# Patient Record
Sex: MALE | Race: WHITE | NOT HISPANIC OR LATINO | Employment: OTHER | URBAN - METROPOLITAN AREA
[De-identification: names, ages, dates, MRNs, and addresses within clinical notes are randomized per-mention and may not be internally consistent; named-entity substitution may affect disease eponyms.]

---

## 2017-01-04 ENCOUNTER — OFFICE VISIT - HEALTHEAST (OUTPATIENT)
Dept: FAMILY MEDICINE | Facility: CLINIC | Age: 67
End: 2017-01-04

## 2017-01-04 DIAGNOSIS — E11.9 TYPE 2 DIABETES MELLITUS (H): ICD-10-CM

## 2017-01-04 DIAGNOSIS — I25.84 CORONARY ARTERY DISEASE DUE TO CALCIFIED CORONARY LESION: ICD-10-CM

## 2017-01-04 DIAGNOSIS — I25.10 CORONARY ARTERY DISEASE DUE TO CALCIFIED CORONARY LESION: ICD-10-CM

## 2017-01-04 ASSESSMENT — MIFFLIN-ST. JEOR: SCORE: 1941.94

## 2017-01-12 ENCOUNTER — OFFICE VISIT - HEALTHEAST (OUTPATIENT)
Dept: CARDIOLOGY | Facility: CLINIC | Age: 67
End: 2017-01-12

## 2017-01-12 DIAGNOSIS — E11.9 TYPE 2 DIABETES MELLITUS (H): ICD-10-CM

## 2017-01-12 DIAGNOSIS — I25.84 CORONARY ARTERY DISEASE DUE TO CALCIFIED CORONARY LESION: ICD-10-CM

## 2017-01-12 DIAGNOSIS — I10 ESSENTIAL HYPERTENSION: ICD-10-CM

## 2017-01-12 DIAGNOSIS — I25.10 CORONARY ARTERY DISEASE DUE TO CALCIFIED CORONARY LESION: ICD-10-CM

## 2017-01-12 DIAGNOSIS — E78.5 DYSLIPIDEMIA, GOAL LDL BELOW 70: ICD-10-CM

## 2017-01-12 ASSESSMENT — MIFFLIN-ST. JEOR: SCORE: 1941.94

## 2017-01-31 ENCOUNTER — OFFICE VISIT - HEALTHEAST (OUTPATIENT)
Dept: CARDIOLOGY | Facility: CLINIC | Age: 67
End: 2017-01-31

## 2017-01-31 DIAGNOSIS — I10 ESSENTIAL HYPERTENSION WITH GOAL BLOOD PRESSURE LESS THAN 130/85: ICD-10-CM

## 2017-01-31 DIAGNOSIS — I25.10 CORONARY ARTERY DISEASE DUE TO CALCIFIED CORONARY LESION: ICD-10-CM

## 2017-01-31 DIAGNOSIS — E78.5 DYSLIPIDEMIA, GOAL LDL BELOW 70: ICD-10-CM

## 2017-01-31 DIAGNOSIS — I20.0 ACCELERATING ANGINA (H): ICD-10-CM

## 2017-01-31 DIAGNOSIS — I25.84 CORONARY ARTERY DISEASE DUE TO CALCIFIED CORONARY LESION: ICD-10-CM

## 2017-01-31 ASSESSMENT — MIFFLIN-ST. JEOR: SCORE: 1937.41

## 2017-02-13 ENCOUNTER — COMMUNICATION - HEALTHEAST (OUTPATIENT)
Dept: FAMILY MEDICINE | Facility: CLINIC | Age: 67
End: 2017-02-13

## 2017-02-13 DIAGNOSIS — I25.84 CORONARY ARTERY DISEASE DUE TO CALCIFIED CORONARY LESION: ICD-10-CM

## 2017-02-13 DIAGNOSIS — I25.10 CORONARY ARTERY DISEASE DUE TO CALCIFIED CORONARY LESION: ICD-10-CM

## 2017-02-21 ENCOUNTER — COMMUNICATION - HEALTHEAST (OUTPATIENT)
Dept: FAMILY MEDICINE | Facility: CLINIC | Age: 67
End: 2017-02-21

## 2017-02-21 DIAGNOSIS — I25.10 CORONARY ARTERY DISEASE DUE TO CALCIFIED CORONARY LESION: ICD-10-CM

## 2017-02-21 DIAGNOSIS — I25.84 CORONARY ARTERY DISEASE DUE TO CALCIFIED CORONARY LESION: ICD-10-CM

## 2017-02-24 ENCOUNTER — COMMUNICATION - HEALTHEAST (OUTPATIENT)
Dept: CARDIOLOGY | Facility: CLINIC | Age: 67
End: 2017-02-24

## 2017-03-14 ENCOUNTER — OFFICE VISIT - HEALTHEAST (OUTPATIENT)
Dept: FAMILY MEDICINE | Facility: CLINIC | Age: 67
End: 2017-03-14

## 2017-03-14 DIAGNOSIS — I25.10 CORONARY ARTERY DISEASE DUE TO CALCIFIED CORONARY LESION: ICD-10-CM

## 2017-03-14 DIAGNOSIS — I25.84 CORONARY ARTERY DISEASE DUE TO CALCIFIED CORONARY LESION: ICD-10-CM

## 2017-03-14 ASSESSMENT — MIFFLIN-ST. JEOR: SCORE: 1930.6

## 2017-04-13 ENCOUNTER — COMMUNICATION - HEALTHEAST (OUTPATIENT)
Dept: FAMILY MEDICINE | Facility: CLINIC | Age: 67
End: 2017-04-13

## 2017-04-13 DIAGNOSIS — E11.9 TYPE 2 DIABETES MELLITUS (H): ICD-10-CM

## 2017-04-14 ENCOUNTER — COMMUNICATION - HEALTHEAST (OUTPATIENT)
Dept: FAMILY MEDICINE | Facility: CLINIC | Age: 67
End: 2017-04-14

## 2017-04-14 DIAGNOSIS — E78.5 DYSLIPIDEMIA: ICD-10-CM

## 2017-05-01 ENCOUNTER — COMMUNICATION - HEALTHEAST (OUTPATIENT)
Dept: FAMILY MEDICINE | Facility: CLINIC | Age: 67
End: 2017-05-01

## 2017-05-01 ENCOUNTER — OFFICE VISIT - HEALTHEAST (OUTPATIENT)
Dept: FAMILY MEDICINE | Facility: CLINIC | Age: 67
End: 2017-05-01

## 2017-05-01 DIAGNOSIS — M54.9 CHRONIC BACK PAIN: ICD-10-CM

## 2017-05-01 DIAGNOSIS — G89.29 CHRONIC BACK PAIN: ICD-10-CM

## 2017-05-01 DIAGNOSIS — I25.84 CORONARY ARTERY DISEASE DUE TO CALCIFIED CORONARY LESION: ICD-10-CM

## 2017-05-01 DIAGNOSIS — J40 BRONCHITIS: ICD-10-CM

## 2017-05-01 DIAGNOSIS — I25.10 CORONARY ARTERY DISEASE DUE TO CALCIFIED CORONARY LESION: ICD-10-CM

## 2017-05-01 ASSESSMENT — MIFFLIN-ST. JEOR: SCORE: 1914.73

## 2017-05-15 ENCOUNTER — COMMUNICATION - HEALTHEAST (OUTPATIENT)
Dept: FAMILY MEDICINE | Facility: CLINIC | Age: 67
End: 2017-05-15

## 2017-05-15 DIAGNOSIS — I25.10 CORONARY ARTERY DISEASE: ICD-10-CM

## 2017-05-18 ENCOUNTER — COMMUNICATION - HEALTHEAST (OUTPATIENT)
Dept: FAMILY MEDICINE | Facility: CLINIC | Age: 67
End: 2017-05-18

## 2017-05-18 DIAGNOSIS — E11.9 TYPE 2 DIABETES MELLITUS (H): ICD-10-CM

## 2017-05-24 ENCOUNTER — OFFICE VISIT - HEALTHEAST (OUTPATIENT)
Dept: FAMILY MEDICINE | Facility: CLINIC | Age: 67
End: 2017-05-24

## 2017-05-24 DIAGNOSIS — E11.9 DIABETES MELLITUS, TYPE 2 (H): ICD-10-CM

## 2017-05-24 DIAGNOSIS — E11.9 TYPE 2 DIABETES MELLITUS (H): ICD-10-CM

## 2017-05-24 DIAGNOSIS — L30.9 DERMATITIS: ICD-10-CM

## 2017-05-24 LAB — HBA1C MFR BLD: 8.9 % (ref 3.5–6)

## 2017-05-24 ASSESSMENT — MIFFLIN-ST. JEOR: SCORE: 1928.34

## 2017-05-26 ENCOUNTER — COMMUNICATION - HEALTHEAST (OUTPATIENT)
Dept: FAMILY MEDICINE | Facility: CLINIC | Age: 67
End: 2017-05-26

## 2017-06-13 ENCOUNTER — COMMUNICATION - HEALTHEAST (OUTPATIENT)
Dept: FAMILY MEDICINE | Facility: CLINIC | Age: 67
End: 2017-06-13

## 2017-06-13 DIAGNOSIS — E11.9 DM (DIABETES MELLITUS) (H): ICD-10-CM

## 2017-06-15 ENCOUNTER — COMMUNICATION - HEALTHEAST (OUTPATIENT)
Dept: FAMILY MEDICINE | Facility: CLINIC | Age: 67
End: 2017-06-15

## 2017-06-26 ENCOUNTER — COMMUNICATION - HEALTHEAST (OUTPATIENT)
Dept: FAMILY MEDICINE | Facility: CLINIC | Age: 67
End: 2017-06-26

## 2017-06-26 DIAGNOSIS — K21.9 GASTROESOPHAGEAL REFLUX DISEASE WITHOUT ESOPHAGITIS: ICD-10-CM

## 2017-09-13 ENCOUNTER — OFFICE VISIT - HEALTHEAST (OUTPATIENT)
Dept: FAMILY MEDICINE | Facility: CLINIC | Age: 67
End: 2017-09-13

## 2017-09-13 DIAGNOSIS — Z00.00 ROUTINE GENERAL MEDICAL EXAMINATION AT A HEALTH CARE FACILITY: ICD-10-CM

## 2017-09-13 DIAGNOSIS — E11.9 DM (DIABETES MELLITUS) (H): ICD-10-CM

## 2017-09-13 DIAGNOSIS — F30.9 BIPOLAR I DISORDER, SINGLE MANIC EPISODE (H): ICD-10-CM

## 2017-09-13 LAB — HBA1C MFR BLD: 8.1 % (ref 3.5–6)

## 2017-09-13 ASSESSMENT — MIFFLIN-ST. JEOR: SCORE: 1931.17

## 2017-10-05 ENCOUNTER — COMMUNICATION - HEALTHEAST (OUTPATIENT)
Dept: FAMILY MEDICINE | Facility: CLINIC | Age: 67
End: 2017-10-05

## 2017-10-05 ENCOUNTER — AMBULATORY - HEALTHEAST (OUTPATIENT)
Dept: FAMILY MEDICINE | Facility: CLINIC | Age: 67
End: 2017-10-05

## 2017-10-05 DIAGNOSIS — I25.10 CORONARY ARTERY DISEASE: ICD-10-CM

## 2017-10-05 DIAGNOSIS — E78.5 DYSLIPIDEMIA: ICD-10-CM

## 2017-10-05 DIAGNOSIS — N13.8 ENLARGED PROSTATE WITH URINARY OBSTRUCTION: ICD-10-CM

## 2017-10-05 DIAGNOSIS — N40.1 ENLARGED PROSTATE WITH URINARY OBSTRUCTION: ICD-10-CM

## 2017-10-17 ENCOUNTER — OFFICE VISIT - HEALTHEAST (OUTPATIENT)
Dept: FAMILY MEDICINE | Facility: CLINIC | Age: 67
End: 2017-10-17

## 2017-10-17 DIAGNOSIS — M54.50 ACUTE BILATERAL LOW BACK PAIN WITHOUT SCIATICA: ICD-10-CM

## 2017-10-17 ASSESSMENT — MIFFLIN-ST. JEOR: SCORE: 1951.58

## 2017-10-24 ENCOUNTER — COMMUNICATION - HEALTHEAST (OUTPATIENT)
Dept: SCHEDULING | Facility: CLINIC | Age: 67
End: 2017-10-24

## 2017-10-26 ENCOUNTER — OFFICE VISIT - HEALTHEAST (OUTPATIENT)
Dept: FAMILY MEDICINE | Facility: CLINIC | Age: 67
End: 2017-10-26

## 2017-10-26 DIAGNOSIS — M54.9 CHRONIC BACK PAIN: ICD-10-CM

## 2017-10-26 DIAGNOSIS — G89.29 CHRONIC BACK PAIN: ICD-10-CM

## 2017-10-26 ASSESSMENT — MIFFLIN-ST. JEOR: SCORE: 1940.24

## 2017-10-30 ENCOUNTER — COMMUNICATION - HEALTHEAST (OUTPATIENT)
Dept: FAMILY MEDICINE | Facility: CLINIC | Age: 67
End: 2017-10-30

## 2017-11-10 ENCOUNTER — OFFICE VISIT - HEALTHEAST (OUTPATIENT)
Dept: FAMILY MEDICINE | Facility: CLINIC | Age: 67
End: 2017-11-10

## 2017-11-10 DIAGNOSIS — E11.9 TYPE 2 DIABETES MELLITUS (H): ICD-10-CM

## 2017-11-10 DIAGNOSIS — M54.9 CHRONIC BACK PAIN: ICD-10-CM

## 2017-11-10 DIAGNOSIS — G89.29 CHRONIC BACK PAIN: ICD-10-CM

## 2017-11-10 ASSESSMENT — MIFFLIN-ST. JEOR: SCORE: 1961.79

## 2017-11-13 ENCOUNTER — COMMUNICATION - HEALTHEAST (OUTPATIENT)
Dept: SCHEDULING | Facility: CLINIC | Age: 67
End: 2017-11-13

## 2017-11-13 DIAGNOSIS — E11.9 TYPE 2 DIABETES MELLITUS (H): ICD-10-CM

## 2017-11-15 ENCOUNTER — AMBULATORY - HEALTHEAST (OUTPATIENT)
Dept: FAMILY MEDICINE | Facility: CLINIC | Age: 67
End: 2017-11-15

## 2017-11-15 DIAGNOSIS — E11.9 TYPE 2 DIABETES MELLITUS (H): ICD-10-CM

## 2017-11-20 ENCOUNTER — COMMUNICATION - HEALTHEAST (OUTPATIENT)
Dept: FAMILY MEDICINE | Facility: CLINIC | Age: 67
End: 2017-11-20

## 2017-11-20 DIAGNOSIS — I25.10 CORONARY ARTERY DISEASE: ICD-10-CM

## 2017-12-06 ENCOUNTER — OFFICE VISIT - HEALTHEAST (OUTPATIENT)
Dept: FAMILY MEDICINE | Facility: CLINIC | Age: 67
End: 2017-12-06

## 2017-12-06 ENCOUNTER — COMMUNICATION - HEALTHEAST (OUTPATIENT)
Dept: FAMILY MEDICINE | Facility: CLINIC | Age: 67
End: 2017-12-06

## 2017-12-06 DIAGNOSIS — R07.89 MUSCULAR CHEST PAIN: ICD-10-CM

## 2017-12-06 DIAGNOSIS — F30.9 BIPOLAR I DISORDER, SINGLE MANIC EPISODE (H): ICD-10-CM

## 2017-12-06 ASSESSMENT — MIFFLIN-ST. JEOR: SCORE: 1968.59

## 2017-12-11 ENCOUNTER — AMBULATORY - HEALTHEAST (OUTPATIENT)
Dept: NURSING | Facility: CLINIC | Age: 67
End: 2017-12-11

## 2017-12-11 ENCOUNTER — AMBULATORY - HEALTHEAST (OUTPATIENT)
Dept: FAMILY MEDICINE | Facility: CLINIC | Age: 67
End: 2017-12-11

## 2017-12-19 ENCOUNTER — COMMUNICATION - HEALTHEAST (OUTPATIENT)
Dept: SCHEDULING | Facility: CLINIC | Age: 67
End: 2017-12-19

## 2017-12-19 ENCOUNTER — AMBULATORY - HEALTHEAST (OUTPATIENT)
Dept: FAMILY MEDICINE | Facility: CLINIC | Age: 67
End: 2017-12-19

## 2017-12-19 DIAGNOSIS — G25.9 MOVEMENT DISORDER: ICD-10-CM

## 2017-12-20 ENCOUNTER — COMMUNICATION - HEALTHEAST (OUTPATIENT)
Dept: FAMILY MEDICINE | Facility: CLINIC | Age: 67
End: 2017-12-20

## 2017-12-26 ENCOUNTER — COMMUNICATION - HEALTHEAST (OUTPATIENT)
Dept: FAMILY MEDICINE | Facility: CLINIC | Age: 67
End: 2017-12-26

## 2017-12-26 DIAGNOSIS — M54.50 ACUTE BILATERAL LOW BACK PAIN WITHOUT SCIATICA: ICD-10-CM

## 2017-12-26 DIAGNOSIS — I10 ESSENTIAL HYPERTENSION: ICD-10-CM

## 2018-01-03 ENCOUNTER — AMBULATORY - HEALTHEAST (OUTPATIENT)
Dept: FAMILY MEDICINE | Facility: CLINIC | Age: 68
End: 2018-01-03

## 2018-01-03 ENCOUNTER — OFFICE VISIT - HEALTHEAST (OUTPATIENT)
Dept: NURSING | Facility: CLINIC | Age: 68
End: 2018-01-03

## 2018-01-03 DIAGNOSIS — M54.5 CHRONIC LOW BACK PAIN, UNSPECIFIED BACK PAIN LATERALITY, WITH SCIATICA PRESENCE UNSPECIFIED: ICD-10-CM

## 2018-01-03 DIAGNOSIS — F31.9 BIPOLAR AFFECTIVE DISORDER, REMISSION STATUS UNSPECIFIED (H): ICD-10-CM

## 2018-01-03 DIAGNOSIS — I25.10 CORONARY ARTERY DISEASE DUE TO CALCIFIED CORONARY LESION: ICD-10-CM

## 2018-01-03 DIAGNOSIS — I25.84 CORONARY ARTERY DISEASE DUE TO CALCIFIED CORONARY LESION: ICD-10-CM

## 2018-01-03 DIAGNOSIS — E78.5 DYSLIPIDEMIA, GOAL LDL BELOW 70: ICD-10-CM

## 2018-01-03 DIAGNOSIS — E11.9 TYPE 2 DIABETES MELLITUS WITHOUT COMPLICATION, WITH LONG-TERM CURRENT USE OF INSULIN (H): ICD-10-CM

## 2018-01-03 DIAGNOSIS — G24.01: ICD-10-CM

## 2018-01-03 DIAGNOSIS — K21.9 GASTROESOPHAGEAL REFLUX DISEASE WITHOUT ESOPHAGITIS: ICD-10-CM

## 2018-01-03 DIAGNOSIS — Z79.4 TYPE 2 DIABETES MELLITUS WITHOUT COMPLICATION, WITH LONG-TERM CURRENT USE OF INSULIN (H): ICD-10-CM

## 2018-01-03 DIAGNOSIS — G89.29 CHRONIC LOW BACK PAIN, UNSPECIFIED BACK PAIN LATERALITY, WITH SCIATICA PRESENCE UNSPECIFIED: ICD-10-CM

## 2018-01-03 DIAGNOSIS — I10 ESSENTIAL HYPERTENSION: ICD-10-CM

## 2018-01-04 ENCOUNTER — COMMUNICATION - HEALTHEAST (OUTPATIENT)
Dept: FAMILY MEDICINE | Facility: CLINIC | Age: 68
End: 2018-01-04

## 2018-01-08 ENCOUNTER — COMMUNICATION - HEALTHEAST (OUTPATIENT)
Dept: FAMILY MEDICINE | Facility: CLINIC | Age: 68
End: 2018-01-08

## 2018-01-12 ENCOUNTER — COMMUNICATION - HEALTHEAST (OUTPATIENT)
Dept: FAMILY MEDICINE | Facility: CLINIC | Age: 68
End: 2018-01-12

## 2018-01-17 ENCOUNTER — COMMUNICATION - HEALTHEAST (OUTPATIENT)
Dept: NURSING | Facility: CLINIC | Age: 68
End: 2018-01-17

## 2018-01-18 ENCOUNTER — OFFICE VISIT - HEALTHEAST (OUTPATIENT)
Dept: CARDIOLOGY | Facility: CLINIC | Age: 68
End: 2018-01-18

## 2018-01-18 DIAGNOSIS — I10 ESSENTIAL HYPERTENSION: ICD-10-CM

## 2018-01-18 DIAGNOSIS — E78.5 DYSLIPIDEMIA, GOAL LDL BELOW 70: ICD-10-CM

## 2018-01-18 DIAGNOSIS — I25.10 CORONARY ARTERY DISEASE INVOLVING NATIVE CORONARY ARTERY OF NATIVE HEART WITHOUT ANGINA PECTORIS: ICD-10-CM

## 2018-01-18 ASSESSMENT — MIFFLIN-ST. JEOR: SCORE: 1969.73

## 2018-03-01 ENCOUNTER — COMMUNICATION - HEALTHEAST (OUTPATIENT)
Dept: ADMINISTRATIVE | Facility: CLINIC | Age: 68
End: 2018-03-01

## 2018-03-05 ENCOUNTER — COMMUNICATION - HEALTHEAST (OUTPATIENT)
Dept: FAMILY MEDICINE | Facility: CLINIC | Age: 68
End: 2018-03-05

## 2018-03-05 DIAGNOSIS — I25.84 CORONARY ARTERY DISEASE DUE TO CALCIFIED CORONARY LESION: ICD-10-CM

## 2018-03-05 DIAGNOSIS — E11.9 TYPE 2 DIABETES MELLITUS (H): ICD-10-CM

## 2018-03-05 DIAGNOSIS — I25.10 CORONARY ARTERY DISEASE DUE TO CALCIFIED CORONARY LESION: ICD-10-CM

## 2018-03-28 ENCOUNTER — OFFICE VISIT - HEALTHEAST (OUTPATIENT)
Dept: NURSING | Facility: CLINIC | Age: 68
End: 2018-03-28

## 2018-03-28 DIAGNOSIS — E78.5 DYSLIPIDEMIA: ICD-10-CM

## 2018-03-28 DIAGNOSIS — F31.9 BIPOLAR AFFECTIVE DISORDER, REMISSION STATUS UNSPECIFIED (H): ICD-10-CM

## 2018-03-28 DIAGNOSIS — I10 ESSENTIAL HYPERTENSION: ICD-10-CM

## 2018-03-28 DIAGNOSIS — K21.9 GASTROESOPHAGEAL REFLUX DISEASE, ESOPHAGITIS PRESENCE NOT SPECIFIED: ICD-10-CM

## 2018-03-28 DIAGNOSIS — N13.8 ENLARGED PROSTATE WITH URINARY OBSTRUCTION: ICD-10-CM

## 2018-03-28 DIAGNOSIS — N40.1 ENLARGED PROSTATE WITH URINARY OBSTRUCTION: ICD-10-CM

## 2018-03-29 ENCOUNTER — OFFICE VISIT - HEALTHEAST (OUTPATIENT)
Dept: FAMILY MEDICINE | Facility: CLINIC | Age: 68
End: 2018-03-29

## 2018-03-29 DIAGNOSIS — H66.91 RIGHT OTITIS MEDIA: ICD-10-CM

## 2018-03-30 ENCOUNTER — COMMUNICATION - HEALTHEAST (OUTPATIENT)
Dept: FAMILY MEDICINE | Facility: CLINIC | Age: 68
End: 2018-03-30

## 2018-03-30 DIAGNOSIS — E11.9 TYPE 2 DIABETES MELLITUS (H): ICD-10-CM

## 2018-04-02 ENCOUNTER — COMMUNICATION - HEALTHEAST (OUTPATIENT)
Dept: FAMILY MEDICINE | Facility: CLINIC | Age: 68
End: 2018-04-02

## 2018-04-02 DIAGNOSIS — I10 ESSENTIAL HYPERTENSION: ICD-10-CM

## 2018-04-03 ENCOUNTER — COMMUNICATION - HEALTHEAST (OUTPATIENT)
Dept: FAMILY MEDICINE | Facility: CLINIC | Age: 68
End: 2018-04-03

## 2018-04-04 ENCOUNTER — RECORDS - HEALTHEAST (OUTPATIENT)
Dept: ADMINISTRATIVE | Facility: OTHER | Age: 68
End: 2018-04-04

## 2018-04-25 ENCOUNTER — OFFICE VISIT - HEALTHEAST (OUTPATIENT)
Dept: FAMILY MEDICINE | Facility: CLINIC | Age: 68
End: 2018-04-25

## 2018-04-25 ENCOUNTER — COMMUNICATION - HEALTHEAST (OUTPATIENT)
Dept: SCHEDULING | Facility: CLINIC | Age: 68
End: 2018-04-25

## 2018-04-25 DIAGNOSIS — I25.84 CORONARY ARTERY DISEASE DUE TO CALCIFIED CORONARY LESION: ICD-10-CM

## 2018-04-25 DIAGNOSIS — F30.9 BIPOLAR I DISORDER, SINGLE MANIC EPISODE (H): ICD-10-CM

## 2018-04-25 DIAGNOSIS — G24.01: ICD-10-CM

## 2018-04-25 DIAGNOSIS — I25.10 CORONARY ARTERY DISEASE DUE TO CALCIFIED CORONARY LESION: ICD-10-CM

## 2018-04-25 DIAGNOSIS — E11.9 TYPE 2 DIABETES MELLITUS (H): ICD-10-CM

## 2018-04-25 LAB
ALBUMIN SERPL-MCNC: 4.1 G/DL (ref 3.5–5)
ALP SERPL-CCNC: 148 U/L (ref 45–120)
ALT SERPL W P-5'-P-CCNC: 45 U/L (ref 0–45)
ANION GAP SERPL CALCULATED.3IONS-SCNC: 12 MMOL/L (ref 5–18)
AST SERPL W P-5'-P-CCNC: 29 U/L (ref 0–40)
BILIRUB SERPL-MCNC: 0.8 MG/DL (ref 0–1)
BUN SERPL-MCNC: 18 MG/DL (ref 8–28)
CALCIUM SERPL-MCNC: 10 MG/DL (ref 8.5–10.5)
CHLORIDE BLD-SCNC: 101 MMOL/L (ref 98–107)
CO2 SERPL-SCNC: 21 MMOL/L (ref 22–31)
CREAT SERPL-MCNC: 1.26 MG/DL (ref 0.7–1.3)
GFR SERPL CREATININE-BSD FRML MDRD: 57 ML/MIN/1.73M2
GLUCOSE BLD-MCNC: 405 MG/DL (ref 70–125)
HBA1C MFR BLD: 10.5 % (ref 3.5–6)
LDLC SERPL CALC-MCNC: 55 MG/DL
POTASSIUM BLD-SCNC: 5 MMOL/L (ref 3.5–5)
PROT SERPL-MCNC: 7.1 G/DL (ref 6–8)
SODIUM SERPL-SCNC: 134 MMOL/L (ref 136–145)

## 2018-04-25 ASSESSMENT — MIFFLIN-ST. JEOR: SCORE: 1960.65

## 2018-05-02 ENCOUNTER — COMMUNICATION - HEALTHEAST (OUTPATIENT)
Dept: FAMILY MEDICINE | Facility: CLINIC | Age: 68
End: 2018-05-02

## 2018-05-02 ENCOUNTER — OFFICE VISIT - HEALTHEAST (OUTPATIENT)
Dept: PHARMACY | Facility: CLINIC | Age: 68
End: 2018-05-02

## 2018-05-02 DIAGNOSIS — Z79.4 TYPE 2 DIABETES MELLITUS WITH HYPERGLYCEMIA, WITH LONG-TERM CURRENT USE OF INSULIN (H): ICD-10-CM

## 2018-05-02 DIAGNOSIS — I10 ESSENTIAL HYPERTENSION: ICD-10-CM

## 2018-05-02 DIAGNOSIS — F30.9 BIPOLAR I DISORDER, SINGLE MANIC EPISODE (H): ICD-10-CM

## 2018-05-02 DIAGNOSIS — E78.5 DYSLIPIDEMIA: ICD-10-CM

## 2018-05-02 DIAGNOSIS — E11.65 TYPE 2 DIABETES MELLITUS WITH HYPERGLYCEMIA, WITH LONG-TERM CURRENT USE OF INSULIN (H): ICD-10-CM

## 2018-05-17 ENCOUNTER — COMMUNICATION - HEALTHEAST (OUTPATIENT)
Dept: FAMILY MEDICINE | Facility: CLINIC | Age: 68
End: 2018-05-17

## 2018-05-23 ENCOUNTER — AMBULATORY - HEALTHEAST (OUTPATIENT)
Dept: LAB | Facility: CLINIC | Age: 68
End: 2018-05-23

## 2018-05-23 ENCOUNTER — OFFICE VISIT - HEALTHEAST (OUTPATIENT)
Dept: PHARMACY | Facility: CLINIC | Age: 68
End: 2018-05-23

## 2018-05-23 DIAGNOSIS — E11.65 TYPE 2 DIABETES MELLITUS WITH HYPERGLYCEMIA, WITH LONG-TERM CURRENT USE OF INSULIN (H): ICD-10-CM

## 2018-05-23 DIAGNOSIS — E78.5 DYSLIPIDEMIA: ICD-10-CM

## 2018-05-23 DIAGNOSIS — Z79.4 TYPE 2 DIABETES MELLITUS WITH HYPERGLYCEMIA, WITH LONG-TERM CURRENT USE OF INSULIN (H): ICD-10-CM

## 2018-05-23 LAB
CHOLEST SERPL-MCNC: 146 MG/DL
FASTING STATUS PATIENT QL REPORTED: NO
HDLC SERPL-MCNC: 26 MG/DL
LDLC SERPL CALC-MCNC: 55 MG/DL
LDLC SERPL CALC-MCNC: ABNORMAL MG/DL
TRIGL SERPL-MCNC: 579 MG/DL

## 2018-05-24 ENCOUNTER — COMMUNICATION - HEALTHEAST (OUTPATIENT)
Dept: PHARMACY | Facility: CLINIC | Age: 68
End: 2018-05-24

## 2018-07-02 ENCOUNTER — OFFICE VISIT - HEALTHEAST (OUTPATIENT)
Dept: PHARMACY | Facility: CLINIC | Age: 68
End: 2018-07-02

## 2018-07-02 DIAGNOSIS — I10 ESSENTIAL HYPERTENSION: ICD-10-CM

## 2018-07-02 DIAGNOSIS — E11.65 TYPE 2 DIABETES MELLITUS WITH HYPERGLYCEMIA, WITH LONG-TERM CURRENT USE OF INSULIN (H): ICD-10-CM

## 2018-07-02 DIAGNOSIS — E78.5 DYSLIPIDEMIA: ICD-10-CM

## 2018-07-02 DIAGNOSIS — Z79.4 TYPE 2 DIABETES MELLITUS WITH HYPERGLYCEMIA, WITH LONG-TERM CURRENT USE OF INSULIN (H): ICD-10-CM

## 2018-07-12 ENCOUNTER — COMMUNICATION - HEALTHEAST (OUTPATIENT)
Dept: FAMILY MEDICINE | Facility: CLINIC | Age: 68
End: 2018-07-12

## 2018-09-09 ENCOUNTER — COMMUNICATION - HEALTHEAST (OUTPATIENT)
Dept: FAMILY MEDICINE | Facility: CLINIC | Age: 68
End: 2018-09-09

## 2018-09-09 DIAGNOSIS — I25.10 CORONARY ARTERY DISEASE: ICD-10-CM

## 2018-09-12 ENCOUNTER — COMMUNICATION - HEALTHEAST (OUTPATIENT)
Dept: FAMILY MEDICINE | Facility: CLINIC | Age: 68
End: 2018-09-12

## 2018-09-12 DIAGNOSIS — E11.9 DM (DIABETES MELLITUS) (H): ICD-10-CM

## 2018-10-01 ENCOUNTER — OFFICE VISIT - HEALTHEAST (OUTPATIENT)
Dept: PHARMACY | Facility: CLINIC | Age: 68
End: 2018-10-01

## 2018-10-01 ENCOUNTER — AMBULATORY - HEALTHEAST (OUTPATIENT)
Dept: LAB | Facility: CLINIC | Age: 68
End: 2018-10-01

## 2018-10-01 DIAGNOSIS — M54.5 CHRONIC LOW BACK PAIN, UNSPECIFIED BACK PAIN LATERALITY, WITH SCIATICA PRESENCE UNSPECIFIED: ICD-10-CM

## 2018-10-01 DIAGNOSIS — G89.29 CHRONIC LOW BACK PAIN, UNSPECIFIED BACK PAIN LATERALITY, WITH SCIATICA PRESENCE UNSPECIFIED: ICD-10-CM

## 2018-10-01 DIAGNOSIS — Z79.4 TYPE 2 DIABETES MELLITUS WITHOUT COMPLICATION, WITH LONG-TERM CURRENT USE OF INSULIN (H): ICD-10-CM

## 2018-10-01 DIAGNOSIS — E11.9 TYPE 2 DIABETES MELLITUS WITHOUT COMPLICATION, WITH LONG-TERM CURRENT USE OF INSULIN (H): ICD-10-CM

## 2018-10-01 LAB
ANION GAP SERPL CALCULATED.3IONS-SCNC: 12 MMOL/L (ref 5–18)
BUN SERPL-MCNC: 14 MG/DL (ref 8–28)
CALCIUM SERPL-MCNC: 10.1 MG/DL (ref 8.5–10.5)
CHLORIDE BLD-SCNC: 105 MMOL/L (ref 98–107)
CO2 SERPL-SCNC: 20 MMOL/L (ref 22–31)
CREAT SERPL-MCNC: 1.07 MG/DL (ref 0.7–1.3)
GFR SERPL CREATININE-BSD FRML MDRD: >60 ML/MIN/1.73M2
GLUCOSE BLD-MCNC: 283 MG/DL (ref 70–125)
HBA1C MFR BLD: 8.5 % (ref 3.5–6)
POTASSIUM BLD-SCNC: 4.5 MMOL/L (ref 3.5–5)
SODIUM SERPL-SCNC: 137 MMOL/L (ref 136–145)

## 2018-10-02 ENCOUNTER — COMMUNICATION - HEALTHEAST (OUTPATIENT)
Dept: PHARMACY | Facility: CLINIC | Age: 68
End: 2018-10-02

## 2018-10-08 ENCOUNTER — COMMUNICATION - HEALTHEAST (OUTPATIENT)
Dept: FAMILY MEDICINE | Facility: CLINIC | Age: 68
End: 2018-10-08

## 2018-10-24 ENCOUNTER — COMMUNICATION - HEALTHEAST (OUTPATIENT)
Dept: FAMILY MEDICINE | Facility: CLINIC | Age: 68
End: 2018-10-24

## 2018-11-03 ENCOUNTER — COMMUNICATION - HEALTHEAST (OUTPATIENT)
Dept: FAMILY MEDICINE | Facility: CLINIC | Age: 68
End: 2018-11-03

## 2018-11-03 DIAGNOSIS — F30.9 BIPOLAR I DISORDER, SINGLE MANIC EPISODE (H): ICD-10-CM

## 2018-12-05 ENCOUNTER — COMMUNICATION - HEALTHEAST (OUTPATIENT)
Dept: FAMILY MEDICINE | Facility: CLINIC | Age: 68
End: 2018-12-05

## 2018-12-05 DIAGNOSIS — I25.10 CORONARY ARTERY DISEASE: ICD-10-CM

## 2018-12-07 ENCOUNTER — COMMUNICATION - HEALTHEAST (OUTPATIENT)
Dept: FAMILY MEDICINE | Facility: CLINIC | Age: 68
End: 2018-12-07

## 2018-12-07 ENCOUNTER — OFFICE VISIT - HEALTHEAST (OUTPATIENT)
Dept: FAMILY MEDICINE | Facility: CLINIC | Age: 68
End: 2018-12-07

## 2018-12-07 DIAGNOSIS — I10 ESSENTIAL HYPERTENSION: ICD-10-CM

## 2018-12-07 DIAGNOSIS — H92.02 LEFT EAR PAIN: ICD-10-CM

## 2018-12-11 ENCOUNTER — RECORDS - HEALTHEAST (OUTPATIENT)
Dept: ADMINISTRATIVE | Facility: OTHER | Age: 68
End: 2018-12-11

## 2018-12-12 ENCOUNTER — OFFICE VISIT - HEALTHEAST (OUTPATIENT)
Dept: FAMILY MEDICINE | Facility: CLINIC | Age: 68
End: 2018-12-12

## 2018-12-12 DIAGNOSIS — F31.13 BIPOLAR DISORDER, CURRENT EPISODE MANIC WITHOUT PSYCHOTIC FEATURES, SEVERE (H): ICD-10-CM

## 2018-12-12 DIAGNOSIS — F30.9 BIPOLAR I DISORDER, SINGLE MANIC EPISODE (H): ICD-10-CM

## 2018-12-12 DIAGNOSIS — H92.02 LEFT EAR PAIN: ICD-10-CM

## 2018-12-17 ENCOUNTER — COMMUNICATION - HEALTHEAST (OUTPATIENT)
Dept: FAMILY MEDICINE | Facility: CLINIC | Age: 68
End: 2018-12-17

## 2018-12-18 ENCOUNTER — AMBULATORY - HEALTHEAST (OUTPATIENT)
Dept: FAMILY MEDICINE | Facility: CLINIC | Age: 68
End: 2018-12-18

## 2018-12-18 DIAGNOSIS — F30.9 BIPOLAR I DISORDER, SINGLE MANIC EPISODE (H): ICD-10-CM

## 2018-12-23 ENCOUNTER — COMMUNICATION - HEALTHEAST (OUTPATIENT)
Dept: CARDIOLOGY | Facility: CLINIC | Age: 68
End: 2018-12-23

## 2018-12-23 DIAGNOSIS — I25.10 CORONARY ARTERY DISEASE INVOLVING NATIVE CORONARY ARTERY OF NATIVE HEART WITHOUT ANGINA PECTORIS: ICD-10-CM

## 2018-12-26 ENCOUNTER — OFFICE VISIT - HEALTHEAST (OUTPATIENT)
Dept: FAMILY MEDICINE | Facility: CLINIC | Age: 68
End: 2018-12-26

## 2018-12-26 DIAGNOSIS — M79.2 NEUROPATHIC PAIN: ICD-10-CM

## 2018-12-26 DIAGNOSIS — F30.9 BIPOLAR I DISORDER, SINGLE MANIC EPISODE (H): ICD-10-CM

## 2018-12-26 ASSESSMENT — MIFFLIN-ST. JEOR: SCORE: 2000.91

## 2018-12-28 ENCOUNTER — COMMUNICATION - HEALTHEAST (OUTPATIENT)
Dept: FAMILY MEDICINE | Facility: CLINIC | Age: 68
End: 2018-12-28

## 2018-12-28 DIAGNOSIS — F30.9 BIPOLAR I DISORDER, SINGLE MANIC EPISODE (H): ICD-10-CM

## 2018-12-31 ENCOUNTER — COMMUNICATION - HEALTHEAST (OUTPATIENT)
Dept: FAMILY MEDICINE | Facility: CLINIC | Age: 68
End: 2018-12-31

## 2019-01-14 ENCOUNTER — OFFICE VISIT - HEALTHEAST (OUTPATIENT)
Dept: FAMILY MEDICINE | Facility: CLINIC | Age: 69
End: 2019-01-14

## 2019-01-14 DIAGNOSIS — F30.9 BIPOLAR I DISORDER, SINGLE MANIC EPISODE (H): ICD-10-CM

## 2019-01-14 DIAGNOSIS — H92.03 OTALGIA OF BOTH EARS: ICD-10-CM

## 2019-01-14 ASSESSMENT — MIFFLIN-ST. JEOR: SCORE: 2009.98

## 2019-02-11 ENCOUNTER — OFFICE VISIT - HEALTHEAST (OUTPATIENT)
Dept: FAMILY MEDICINE | Facility: CLINIC | Age: 69
End: 2019-02-11

## 2019-02-11 DIAGNOSIS — F30.9 BIPOLAR I DISORDER, SINGLE MANIC EPISODE (H): ICD-10-CM

## 2019-02-11 DIAGNOSIS — Z79.4 TYPE 2 DIABETES MELLITUS WITH OTHER CIRCULATORY COMPLICATION, WITH LONG-TERM CURRENT USE OF INSULIN (H): ICD-10-CM

## 2019-02-11 DIAGNOSIS — E11.59 TYPE 2 DIABETES MELLITUS WITH OTHER CIRCULATORY COMPLICATION, WITH LONG-TERM CURRENT USE OF INSULIN (H): ICD-10-CM

## 2019-02-11 LAB — HBA1C MFR BLD: 8.8 % (ref 3.5–6)

## 2019-02-11 ASSESSMENT — MIFFLIN-ST. JEOR: SCORE: 2009.98

## 2019-03-03 ENCOUNTER — COMMUNICATION - HEALTHEAST (OUTPATIENT)
Dept: NURSING | Facility: CLINIC | Age: 69
End: 2019-03-03

## 2019-03-03 DIAGNOSIS — E78.5 DYSLIPIDEMIA: ICD-10-CM

## 2019-03-06 ENCOUNTER — COMMUNICATION - HEALTHEAST (OUTPATIENT)
Dept: FAMILY MEDICINE | Facility: CLINIC | Age: 69
End: 2019-03-06

## 2019-03-06 DIAGNOSIS — I10 ESSENTIAL HYPERTENSION: ICD-10-CM

## 2019-03-08 ENCOUNTER — COMMUNICATION - HEALTHEAST (OUTPATIENT)
Dept: NURSING | Facility: CLINIC | Age: 69
End: 2019-03-08

## 2019-03-08 DIAGNOSIS — N40.1 ENLARGED PROSTATE WITH URINARY OBSTRUCTION: ICD-10-CM

## 2019-03-08 DIAGNOSIS — K21.9 GASTROESOPHAGEAL REFLUX DISEASE, ESOPHAGITIS PRESENCE NOT SPECIFIED: ICD-10-CM

## 2019-03-08 DIAGNOSIS — N13.8 ENLARGED PROSTATE WITH URINARY OBSTRUCTION: ICD-10-CM

## 2019-04-05 ENCOUNTER — COMMUNICATION - HEALTHEAST (OUTPATIENT)
Dept: FAMILY MEDICINE | Facility: CLINIC | Age: 69
End: 2019-04-05

## 2019-04-05 DIAGNOSIS — E11.9 TYPE 2 DIABETES MELLITUS (H): ICD-10-CM

## 2019-06-11 ENCOUNTER — OFFICE VISIT - HEALTHEAST (OUTPATIENT)
Dept: CARDIOLOGY | Facility: CLINIC | Age: 69
End: 2019-06-11

## 2019-06-11 DIAGNOSIS — I10 ESSENTIAL HYPERTENSION: ICD-10-CM

## 2019-06-11 DIAGNOSIS — E78.5 DYSLIPIDEMIA, GOAL LDL BELOW 70: ICD-10-CM

## 2019-06-11 DIAGNOSIS — I25.10 CORONARY ARTERY DISEASE, ANGINA PRESENCE UNSPECIFIED, UNSPECIFIED VESSEL OR LESION TYPE, UNSPECIFIED WHETHER NATIVE OR TRANSPLANTED HEART: ICD-10-CM

## 2019-06-11 ASSESSMENT — MIFFLIN-ST. JEOR: SCORE: 2014.52

## 2019-06-17 ENCOUNTER — COMMUNICATION - HEALTHEAST (OUTPATIENT)
Dept: FAMILY MEDICINE | Facility: CLINIC | Age: 69
End: 2019-06-17

## 2019-06-24 ENCOUNTER — RECORDS - HEALTHEAST (OUTPATIENT)
Dept: ADMINISTRATIVE | Facility: OTHER | Age: 69
End: 2019-06-24

## 2019-06-29 ENCOUNTER — COMMUNICATION - HEALTHEAST (OUTPATIENT)
Dept: FAMILY MEDICINE | Facility: CLINIC | Age: 69
End: 2019-06-29

## 2019-06-29 DIAGNOSIS — F30.9 BIPOLAR I DISORDER, SINGLE MANIC EPISODE (H): ICD-10-CM

## 2019-07-16 ENCOUNTER — OFFICE VISIT - HEALTHEAST (OUTPATIENT)
Dept: FAMILY MEDICINE | Facility: CLINIC | Age: 69
End: 2019-07-16

## 2019-07-16 DIAGNOSIS — E11.9 DIABETES MELLITUS, TYPE 2 (H): ICD-10-CM

## 2019-07-16 DIAGNOSIS — M13.0 POLYARTHRITIS: ICD-10-CM

## 2019-07-16 DIAGNOSIS — I25.10 CORONARY ARTERY DISEASE DUE TO CALCIFIED CORONARY LESION: ICD-10-CM

## 2019-07-16 DIAGNOSIS — F30.9 BIPOLAR I DISORDER, SINGLE MANIC EPISODE (H): ICD-10-CM

## 2019-07-16 DIAGNOSIS — F30.9 MANIC EPISODE (H): ICD-10-CM

## 2019-07-16 DIAGNOSIS — I25.84 CORONARY ARTERY DISEASE DUE TO CALCIFIED CORONARY LESION: ICD-10-CM

## 2019-07-16 LAB
CREAT UR-MCNC: 171.4 MG/DL
HBA1C MFR BLD: 10.4 % (ref 3.5–6)
MICROALBUMIN UR-MCNC: 1.1 MG/DL (ref 0–1.99)
MICROALBUMIN/CREAT UR: 6.4 MG/G
RHEUMATOID FACT SERPL-ACNC: <15 IU/ML (ref 0–30)

## 2019-07-16 ASSESSMENT — MIFFLIN-ST. JEOR: SCORE: 2000.91

## 2019-07-17 LAB — B BURGDOR IGG+IGM SER QL: 0.01 INDEX VALUE

## 2019-07-18 LAB — ANA SER QL: 0.2 U

## 2019-07-29 ENCOUNTER — OFFICE VISIT - HEALTHEAST (OUTPATIENT)
Dept: FAMILY MEDICINE | Facility: CLINIC | Age: 69
End: 2019-07-29

## 2019-07-29 DIAGNOSIS — E11.59 TYPE 2 DIABETES MELLITUS WITH OTHER CIRCULATORY COMPLICATION, WITH LONG-TERM CURRENT USE OF INSULIN (H): ICD-10-CM

## 2019-07-29 DIAGNOSIS — Z79.4 TYPE 2 DIABETES MELLITUS WITH OTHER CIRCULATORY COMPLICATION, WITH LONG-TERM CURRENT USE OF INSULIN (H): ICD-10-CM

## 2019-07-29 DIAGNOSIS — F30.9 BIPOLAR I DISORDER, SINGLE MANIC EPISODE (H): ICD-10-CM

## 2019-07-29 DIAGNOSIS — M15.0 PRIMARY OSTEOARTHRITIS INVOLVING MULTIPLE JOINTS: ICD-10-CM

## 2019-08-28 ENCOUNTER — COMMUNICATION - HEALTHEAST (OUTPATIENT)
Dept: FAMILY MEDICINE | Facility: CLINIC | Age: 69
End: 2019-08-28

## 2019-08-28 DIAGNOSIS — E11.9 DM (DIABETES MELLITUS) (H): ICD-10-CM

## 2019-09-09 ENCOUNTER — COMMUNICATION - HEALTHEAST (OUTPATIENT)
Dept: FAMILY MEDICINE | Facility: CLINIC | Age: 69
End: 2019-09-09

## 2019-09-09 ENCOUNTER — OFFICE VISIT - HEALTHEAST (OUTPATIENT)
Dept: FAMILY MEDICINE | Facility: CLINIC | Age: 69
End: 2019-09-09

## 2019-09-09 DIAGNOSIS — F30.9 BIPOLAR I DISORDER, SINGLE MANIC EPISODE (H): ICD-10-CM

## 2019-09-09 DIAGNOSIS — R45.1 AGITATION: ICD-10-CM

## 2019-09-09 ASSESSMENT — MIFFLIN-ST. JEOR: SCORE: 2037.2

## 2019-09-10 ENCOUNTER — AMBULATORY - HEALTHEAST (OUTPATIENT)
Dept: FAMILY MEDICINE | Facility: CLINIC | Age: 69
End: 2019-09-10

## 2019-09-10 ENCOUNTER — COMMUNICATION - HEALTHEAST (OUTPATIENT)
Dept: FAMILY MEDICINE | Facility: CLINIC | Age: 69
End: 2019-09-10

## 2019-09-14 ENCOUNTER — COMMUNICATION - HEALTHEAST (OUTPATIENT)
Dept: CARDIOLOGY | Facility: CLINIC | Age: 69
End: 2019-09-14

## 2019-09-14 DIAGNOSIS — I25.10 CORONARY ARTERY DISEASE INVOLVING NATIVE CORONARY ARTERY OF NATIVE HEART WITHOUT ANGINA PECTORIS: ICD-10-CM

## 2019-09-24 ENCOUNTER — COMMUNICATION - HEALTHEAST (OUTPATIENT)
Dept: CARDIOLOGY | Facility: CLINIC | Age: 69
End: 2019-09-24

## 2019-09-24 DIAGNOSIS — I25.10 CORONARY ARTERY DISEASE INVOLVING NATIVE CORONARY ARTERY OF NATIVE HEART WITHOUT ANGINA PECTORIS: ICD-10-CM

## 2019-10-01 ENCOUNTER — COMMUNICATION - HEALTHEAST (OUTPATIENT)
Dept: FAMILY MEDICINE | Facility: CLINIC | Age: 69
End: 2019-10-01

## 2019-10-07 ENCOUNTER — RECORDS - HEALTHEAST (OUTPATIENT)
Dept: HEALTH INFORMATION MANAGEMENT | Facility: CLINIC | Age: 69
End: 2019-10-07

## 2019-10-10 ENCOUNTER — AMBULATORY - HEALTHEAST (OUTPATIENT)
Dept: NURSING | Facility: CLINIC | Age: 69
End: 2019-10-10

## 2019-10-10 DIAGNOSIS — Z23 FLU VACCINE NEED: ICD-10-CM

## 2019-10-28 ENCOUNTER — COMMUNICATION - HEALTHEAST (OUTPATIENT)
Dept: FAMILY MEDICINE | Facility: CLINIC | Age: 69
End: 2019-10-28

## 2019-10-28 DIAGNOSIS — F30.9 BIPOLAR I DISORDER, SINGLE MANIC EPISODE (H): ICD-10-CM

## 2019-11-20 ENCOUNTER — COMMUNICATION - HEALTHEAST (OUTPATIENT)
Dept: FAMILY MEDICINE | Facility: CLINIC | Age: 69
End: 2019-11-20

## 2019-11-20 DIAGNOSIS — I25.10 CORONARY ARTERY DISEASE: ICD-10-CM

## 2019-12-02 ENCOUNTER — COMMUNICATION - HEALTHEAST (OUTPATIENT)
Dept: FAMILY MEDICINE | Facility: CLINIC | Age: 69
End: 2019-12-02

## 2019-12-02 DIAGNOSIS — Z76.0 ENCOUNTER FOR MEDICATION REFILL: ICD-10-CM

## 2019-12-18 ENCOUNTER — COMMUNICATION - HEALTHEAST (OUTPATIENT)
Dept: FAMILY MEDICINE | Facility: CLINIC | Age: 69
End: 2019-12-18

## 2019-12-18 DIAGNOSIS — Z76.0 ENCOUNTER FOR MEDICATION REFILL: ICD-10-CM

## 2019-12-31 ENCOUNTER — OFFICE VISIT - HEALTHEAST (OUTPATIENT)
Dept: FAMILY MEDICINE | Facility: CLINIC | Age: 69
End: 2019-12-31

## 2019-12-31 DIAGNOSIS — I25.10 CORONARY ARTERY DISEASE INVOLVING NATIVE CORONARY ARTERY OF NATIVE HEART WITHOUT ANGINA PECTORIS: ICD-10-CM

## 2019-12-31 DIAGNOSIS — M15.0 PRIMARY OSTEOARTHRITIS INVOLVING MULTIPLE JOINTS: ICD-10-CM

## 2019-12-31 DIAGNOSIS — G89.29 CHRONIC MIDLINE LOW BACK PAIN WITHOUT SCIATICA: ICD-10-CM

## 2019-12-31 DIAGNOSIS — I10 ESSENTIAL HYPERTENSION: ICD-10-CM

## 2019-12-31 DIAGNOSIS — E11.00 TYPE 2 DIABETES MELLITUS WITH HYPEROSMOLARITY WITHOUT COMA, UNSPECIFIED WHETHER LONG TERM INSULIN USE (H): ICD-10-CM

## 2019-12-31 DIAGNOSIS — M54.50 CHRONIC MIDLINE LOW BACK PAIN WITHOUT SCIATICA: ICD-10-CM

## 2019-12-31 LAB
ANION GAP SERPL CALCULATED.3IONS-SCNC: 15 MMOL/L (ref 5–18)
BUN SERPL-MCNC: 11 MG/DL (ref 8–28)
CALCIUM SERPL-MCNC: 9.4 MG/DL (ref 8.5–10.5)
CHLORIDE BLD-SCNC: 107 MMOL/L (ref 98–107)
CO2 SERPL-SCNC: 16 MMOL/L (ref 22–31)
CREAT SERPL-MCNC: 0.89 MG/DL (ref 0.7–1.3)
GFR SERPL CREATININE-BSD FRML MDRD: >60 ML/MIN/1.73M2
GLUCOSE BLD-MCNC: 185 MG/DL (ref 70–125)
HBA1C MFR BLD: 9.6 % (ref 3.5–6)
POTASSIUM BLD-SCNC: 4.2 MMOL/L (ref 3.5–5)
SODIUM SERPL-SCNC: 138 MMOL/L (ref 136–145)

## 2019-12-31 ASSESSMENT — PATIENT HEALTH QUESTIONNAIRE - PHQ9: SUM OF ALL RESPONSES TO PHQ QUESTIONS 1-9: 10

## 2019-12-31 ASSESSMENT — MIFFLIN-ST. JEOR: SCORE: 2037.2

## 2020-01-02 ENCOUNTER — COMMUNICATION - HEALTHEAST (OUTPATIENT)
Dept: FAMILY MEDICINE | Facility: CLINIC | Age: 70
End: 2020-01-02

## 2020-01-09 ENCOUNTER — COMMUNICATION - HEALTHEAST (OUTPATIENT)
Dept: FAMILY MEDICINE | Facility: CLINIC | Age: 70
End: 2020-01-09

## 2020-01-09 ENCOUNTER — AMBULATORY - HEALTHEAST (OUTPATIENT)
Dept: FAMILY MEDICINE | Facility: CLINIC | Age: 70
End: 2020-01-09

## 2020-01-09 DIAGNOSIS — G89.29 OTHER CHRONIC PAIN: ICD-10-CM

## 2020-01-09 DIAGNOSIS — G89.29 CHRONIC MIDLINE LOW BACK PAIN WITHOUT SCIATICA: ICD-10-CM

## 2020-01-09 DIAGNOSIS — M54.50 CHRONIC MIDLINE LOW BACK PAIN WITHOUT SCIATICA: ICD-10-CM

## 2020-01-15 ENCOUNTER — COMMUNICATION - HEALTHEAST (OUTPATIENT)
Dept: FAMILY MEDICINE | Facility: CLINIC | Age: 70
End: 2020-01-15

## 2020-01-15 DIAGNOSIS — F30.9 BIPOLAR I DISORDER, SINGLE MANIC EPISODE (H): ICD-10-CM

## 2020-01-17 ENCOUNTER — OFFICE VISIT - HEALTHEAST (OUTPATIENT)
Dept: PHARMACY | Facility: CLINIC | Age: 70
End: 2020-01-17

## 2020-01-17 DIAGNOSIS — F30.9 BIPOLAR I DISORDER, SINGLE MANIC EPISODE (H): ICD-10-CM

## 2020-01-17 DIAGNOSIS — M54.50 CHRONIC MIDLINE LOW BACK PAIN WITHOUT SCIATICA: ICD-10-CM

## 2020-01-17 DIAGNOSIS — K21.9 GASTROESOPHAGEAL REFLUX DISEASE WITHOUT ESOPHAGITIS: ICD-10-CM

## 2020-01-17 DIAGNOSIS — N13.8 ENLARGED PROSTATE WITH URINARY OBSTRUCTION: ICD-10-CM

## 2020-01-17 DIAGNOSIS — I25.10 CORONARY ARTERY DISEASE INVOLVING NATIVE CORONARY ARTERY OF NATIVE HEART WITHOUT ANGINA PECTORIS: ICD-10-CM

## 2020-01-17 DIAGNOSIS — G89.29 CHRONIC MIDLINE LOW BACK PAIN WITHOUT SCIATICA: ICD-10-CM

## 2020-01-17 DIAGNOSIS — Z79.4 TYPE 2 DIABETES MELLITUS WITH HYPERGLYCEMIA, WITH LONG-TERM CURRENT USE OF INSULIN (H): ICD-10-CM

## 2020-01-17 DIAGNOSIS — M15.0 PRIMARY OSTEOARTHRITIS INVOLVING MULTIPLE JOINTS: ICD-10-CM

## 2020-01-17 DIAGNOSIS — N40.1 ENLARGED PROSTATE WITH URINARY OBSTRUCTION: ICD-10-CM

## 2020-01-17 DIAGNOSIS — E11.65 TYPE 2 DIABETES MELLITUS WITH HYPERGLYCEMIA, WITH LONG-TERM CURRENT USE OF INSULIN (H): ICD-10-CM

## 2020-01-22 ENCOUNTER — OFFICE VISIT - HEALTHEAST (OUTPATIENT)
Dept: PHARMACY | Facility: CLINIC | Age: 70
End: 2020-01-22

## 2020-01-22 DIAGNOSIS — E11.65 TYPE 2 DIABETES MELLITUS WITH HYPERGLYCEMIA, WITH LONG-TERM CURRENT USE OF INSULIN (H): ICD-10-CM

## 2020-01-22 DIAGNOSIS — Z79.4 TYPE 2 DIABETES MELLITUS WITH HYPERGLYCEMIA, WITH LONG-TERM CURRENT USE OF INSULIN (H): ICD-10-CM

## 2020-01-22 DIAGNOSIS — F30.9 BIPOLAR I DISORDER, SINGLE MANIC EPISODE (H): ICD-10-CM

## 2020-02-05 ENCOUNTER — OFFICE VISIT - HEALTHEAST (OUTPATIENT)
Dept: FAMILY MEDICINE | Facility: CLINIC | Age: 70
End: 2020-02-05

## 2020-02-05 ENCOUNTER — OFFICE VISIT - HEALTHEAST (OUTPATIENT)
Dept: PHARMACY | Facility: CLINIC | Age: 70
End: 2020-02-05

## 2020-02-05 ENCOUNTER — RECORDS - HEALTHEAST (OUTPATIENT)
Dept: ADMINISTRATIVE | Facility: OTHER | Age: 70
End: 2020-02-05

## 2020-02-05 DIAGNOSIS — E11.65 TYPE 2 DIABETES MELLITUS WITH HYPERGLYCEMIA, WITH LONG-TERM CURRENT USE OF INSULIN (H): ICD-10-CM

## 2020-02-05 DIAGNOSIS — F30.9 BIPOLAR I DISORDER, SINGLE MANIC EPISODE (H): ICD-10-CM

## 2020-02-05 DIAGNOSIS — M79.641 PAIN OF RIGHT HAND: ICD-10-CM

## 2020-02-05 DIAGNOSIS — Z79.4 TYPE 2 DIABETES MELLITUS WITH HYPERGLYCEMIA, WITH LONG-TERM CURRENT USE OF INSULIN (H): ICD-10-CM

## 2020-02-05 RX ORDER — QUETIAPINE FUMARATE 100 MG/1
100-300 TABLET, FILM COATED ORAL EVERY MORNING
Qty: 90 TABLET | Refills: 11 | Status: SHIPPED | OUTPATIENT
Start: 2020-02-05 | End: 2021-08-23

## 2020-02-10 ENCOUNTER — COMMUNICATION - HEALTHEAST (OUTPATIENT)
Dept: FAMILY MEDICINE | Facility: CLINIC | Age: 70
End: 2020-02-10

## 2020-02-12 ENCOUNTER — COMMUNICATION - HEALTHEAST (OUTPATIENT)
Dept: NURSING | Facility: CLINIC | Age: 70
End: 2020-02-12

## 2020-02-12 DIAGNOSIS — E78.5 DYSLIPIDEMIA: ICD-10-CM

## 2020-02-19 ENCOUNTER — AMBULATORY - HEALTHEAST (OUTPATIENT)
Dept: FAMILY MEDICINE | Facility: CLINIC | Age: 70
End: 2020-02-19

## 2020-02-19 ENCOUNTER — COMMUNICATION - HEALTHEAST (OUTPATIENT)
Dept: FAMILY MEDICINE | Facility: CLINIC | Age: 70
End: 2020-02-19

## 2020-02-19 DIAGNOSIS — R52 PAIN: ICD-10-CM

## 2020-02-19 DIAGNOSIS — F31.13 BIPOLAR DISORDER, CURRENT EPISODE MANIC WITHOUT PSYCHOTIC FEATURES, SEVERE (H): ICD-10-CM

## 2020-02-21 ENCOUNTER — COMMUNICATION - HEALTHEAST (OUTPATIENT)
Dept: NURSING | Facility: CLINIC | Age: 70
End: 2020-02-21

## 2020-02-21 DIAGNOSIS — N40.1 ENLARGED PROSTATE WITH URINARY OBSTRUCTION: ICD-10-CM

## 2020-02-21 DIAGNOSIS — I10 ESSENTIAL HYPERTENSION: ICD-10-CM

## 2020-02-21 DIAGNOSIS — K21.9 GASTROESOPHAGEAL REFLUX DISEASE, ESOPHAGITIS PRESENCE NOT SPECIFIED: ICD-10-CM

## 2020-02-21 DIAGNOSIS — N13.8 ENLARGED PROSTATE WITH URINARY OBSTRUCTION: ICD-10-CM

## 2020-02-26 ENCOUNTER — COMMUNICATION - HEALTHEAST (OUTPATIENT)
Dept: FAMILY MEDICINE | Facility: CLINIC | Age: 70
End: 2020-02-26

## 2020-02-26 ENCOUNTER — AMBULATORY - HEALTHEAST (OUTPATIENT)
Dept: FAMILY MEDICINE | Facility: CLINIC | Age: 70
End: 2020-02-26

## 2020-02-26 DIAGNOSIS — F31.13 BIPOLAR DISORDER, CURRENT EPISODE MANIC WITHOUT PSYCHOTIC FEATURES, SEVERE (H): ICD-10-CM

## 2020-02-26 DIAGNOSIS — R52 PAIN: ICD-10-CM

## 2020-02-27 ENCOUNTER — COMMUNICATION - HEALTHEAST (OUTPATIENT)
Dept: FAMILY MEDICINE | Facility: CLINIC | Age: 70
End: 2020-02-27

## 2020-02-27 DIAGNOSIS — I25.84 CORONARY ARTERY DISEASE DUE TO CALCIFIED CORONARY LESION: ICD-10-CM

## 2020-02-27 DIAGNOSIS — I25.10 CORONARY ARTERY DISEASE DUE TO CALCIFIED CORONARY LESION: ICD-10-CM

## 2020-02-28 ENCOUNTER — COMMUNICATION - HEALTHEAST (OUTPATIENT)
Dept: FAMILY MEDICINE | Facility: CLINIC | Age: 70
End: 2020-02-28

## 2020-02-28 DIAGNOSIS — I10 ESSENTIAL HYPERTENSION: ICD-10-CM

## 2020-04-04 ENCOUNTER — COMMUNICATION - HEALTHEAST (OUTPATIENT)
Dept: FAMILY MEDICINE | Facility: CLINIC | Age: 70
End: 2020-04-04

## 2020-04-04 DIAGNOSIS — Z76.0 ENCOUNTER FOR MEDICATION REFILL: ICD-10-CM

## 2020-05-08 ENCOUNTER — COMMUNICATION - HEALTHEAST (OUTPATIENT)
Dept: FAMILY MEDICINE | Facility: CLINIC | Age: 70
End: 2020-05-08

## 2020-05-08 DIAGNOSIS — I25.10 CORONARY ARTERY DISEASE: ICD-10-CM

## 2020-05-19 ENCOUNTER — COMMUNICATION - HEALTHEAST (OUTPATIENT)
Dept: FAMILY MEDICINE | Facility: CLINIC | Age: 70
End: 2020-05-19

## 2020-05-19 ENCOUNTER — OFFICE VISIT - HEALTHEAST (OUTPATIENT)
Dept: FAMILY MEDICINE | Facility: CLINIC | Age: 70
End: 2020-05-19

## 2020-05-19 ENCOUNTER — RECORDS - HEALTHEAST (OUTPATIENT)
Dept: GENERAL RADIOLOGY | Facility: CLINIC | Age: 70
End: 2020-05-19
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
## 2020-05-19 DIAGNOSIS — M25.561 PAIN IN RIGHT KNEE: ICD-10-CM

## 2020-05-19 DIAGNOSIS — M17.11 OSTEOARTHRITIS OF RIGHT KNEE, UNSPECIFIED OSTEOARTHRITIS TYPE: ICD-10-CM

## 2020-05-19 DIAGNOSIS — S89.91XA KNEE INJURY, RIGHT, INITIAL ENCOUNTER: ICD-10-CM
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
## 2020-05-19 DIAGNOSIS — M25.561 ACUTE PAIN OF RIGHT KNEE: ICD-10-CM

## 2020-05-19 DIAGNOSIS — S89.91XA UNSPECIFIED INJURY OF RIGHT LOWER LEG, INITIAL ENCOUNTER: ICD-10-CM

## 2020-05-19 ASSESSMENT — PATIENT HEALTH QUESTIONNAIRE - PHQ9: SUM OF ALL RESPONSES TO PHQ QUESTIONS 1-9: 14

## 2020-06-04 ENCOUNTER — COMMUNICATION - HEALTHEAST (OUTPATIENT)
Dept: FAMILY MEDICINE | Facility: CLINIC | Age: 70
End: 2020-06-04

## 2020-06-05 ENCOUNTER — AMBULATORY - HEALTHEAST (OUTPATIENT)
Dept: FAMILY MEDICINE | Facility: CLINIC | Age: 70
End: 2020-06-05

## 2020-06-05 DIAGNOSIS — M54.9 CHRONIC BILATERAL BACK PAIN, UNSPECIFIED BACK LOCATION: ICD-10-CM

## 2020-06-05 DIAGNOSIS — G89.29 CHRONIC BILATERAL BACK PAIN, UNSPECIFIED BACK LOCATION: ICD-10-CM

## 2020-06-12 ENCOUNTER — COMMUNICATION - HEALTHEAST (OUTPATIENT)
Dept: CARDIOLOGY | Facility: CLINIC | Age: 70
End: 2020-06-12

## 2020-06-12 DIAGNOSIS — I25.10 CORONARY ARTERY DISEASE INVOLVING NATIVE CORONARY ARTERY OF NATIVE HEART WITHOUT ANGINA PECTORIS: ICD-10-CM

## 2020-06-15 ENCOUNTER — COMMUNICATION - HEALTHEAST (OUTPATIENT)
Dept: FAMILY MEDICINE | Facility: CLINIC | Age: 70
End: 2020-06-15

## 2020-06-15 DIAGNOSIS — I25.10 CORONARY ARTERY DISEASE DUE TO CALCIFIED CORONARY LESION: ICD-10-CM

## 2020-06-15 DIAGNOSIS — I25.84 CORONARY ARTERY DISEASE DUE TO CALCIFIED CORONARY LESION: ICD-10-CM

## 2020-06-18 ENCOUNTER — OFFICE VISIT - HEALTHEAST (OUTPATIENT)
Dept: FAMILY MEDICINE | Facility: CLINIC | Age: 70
End: 2020-06-18

## 2020-06-18 DIAGNOSIS — M15.0 PRIMARY OSTEOARTHRITIS INVOLVING MULTIPLE JOINTS: ICD-10-CM

## 2020-06-18 DIAGNOSIS — H92.02 LEFT EAR PAIN: ICD-10-CM

## 2020-06-18 DIAGNOSIS — M54.50 CHRONIC MIDLINE LOW BACK PAIN WITHOUT SCIATICA: ICD-10-CM

## 2020-06-18 DIAGNOSIS — G89.29 CHRONIC MIDLINE LOW BACK PAIN WITHOUT SCIATICA: ICD-10-CM

## 2020-06-18 DIAGNOSIS — F30.9 BIPOLAR I DISORDER, SINGLE MANIC EPISODE (H): ICD-10-CM

## 2020-06-18 DIAGNOSIS — M79.2 NEUROPATHIC PAIN: ICD-10-CM

## 2020-06-29 ENCOUNTER — COMMUNICATION - HEALTHEAST (OUTPATIENT)
Dept: FAMILY MEDICINE | Facility: CLINIC | Age: 70
End: 2020-06-29

## 2020-06-29 DIAGNOSIS — G89.29 CHRONIC MIDLINE LOW BACK PAIN WITHOUT SCIATICA: ICD-10-CM

## 2020-06-29 DIAGNOSIS — M15.0 PRIMARY OSTEOARTHRITIS INVOLVING MULTIPLE JOINTS: ICD-10-CM

## 2020-06-29 DIAGNOSIS — M79.2 NEUROPATHIC PAIN: ICD-10-CM

## 2020-06-29 DIAGNOSIS — M54.50 CHRONIC MIDLINE LOW BACK PAIN WITHOUT SCIATICA: ICD-10-CM

## 2020-07-11 ENCOUNTER — COMMUNICATION - HEALTHEAST (OUTPATIENT)
Dept: FAMILY MEDICINE | Facility: CLINIC | Age: 70
End: 2020-07-11

## 2020-07-11 DIAGNOSIS — M25.561 ACUTE PAIN OF RIGHT KNEE: ICD-10-CM

## 2020-07-11 DIAGNOSIS — M17.11 OSTEOARTHRITIS OF RIGHT KNEE, UNSPECIFIED OSTEOARTHRITIS TYPE: ICD-10-CM

## 2020-07-18 ENCOUNTER — COMMUNICATION - HEALTHEAST (OUTPATIENT)
Dept: FAMILY MEDICINE | Facility: CLINIC | Age: 70
End: 2020-07-18

## 2020-07-18 ENCOUNTER — COMMUNICATION - HEALTHEAST (OUTPATIENT)
Dept: CARDIOLOGY | Facility: CLINIC | Age: 70
End: 2020-07-18

## 2020-07-18 DIAGNOSIS — I25.10 CORONARY ARTERY DISEASE INVOLVING NATIVE CORONARY ARTERY OF NATIVE HEART WITHOUT ANGINA PECTORIS: ICD-10-CM

## 2020-07-18 DIAGNOSIS — I25.10 CORONARY ARTERY DISEASE: ICD-10-CM

## 2020-08-02 ENCOUNTER — COMMUNICATION - HEALTHEAST (OUTPATIENT)
Dept: FAMILY MEDICINE | Facility: CLINIC | Age: 70
End: 2020-08-02

## 2020-08-02 DIAGNOSIS — I25.10 CORONARY ARTERY DISEASE DUE TO CALCIFIED CORONARY LESION: ICD-10-CM

## 2020-08-02 DIAGNOSIS — E11.9 DM (DIABETES MELLITUS) (H): ICD-10-CM

## 2020-08-02 DIAGNOSIS — I25.84 CORONARY ARTERY DISEASE DUE TO CALCIFIED CORONARY LESION: ICD-10-CM

## 2020-08-03 RX ORDER — NITROGLYCERIN 0.4 MG/1
TABLET SUBLINGUAL
Qty: 25 TABLET | Refills: 3 | Status: SHIPPED | OUTPATIENT
Start: 2020-08-03 | End: 2021-08-23

## 2020-08-19 ENCOUNTER — COMMUNICATION - HEALTHEAST (OUTPATIENT)
Dept: FAMILY MEDICINE | Facility: CLINIC | Age: 70
End: 2020-08-19

## 2020-09-01 ENCOUNTER — AMBULATORY - HEALTHEAST (OUTPATIENT)
Dept: FAMILY MEDICINE | Facility: CLINIC | Age: 70
End: 2020-09-01

## 2020-09-01 DIAGNOSIS — F41.9 ANXIETY: ICD-10-CM

## 2020-09-01 DIAGNOSIS — M54.50 CHRONIC MIDLINE LOW BACK PAIN WITHOUT SCIATICA: ICD-10-CM

## 2020-09-01 DIAGNOSIS — G89.29 CHRONIC MIDLINE LOW BACK PAIN WITHOUT SCIATICA: ICD-10-CM

## 2020-09-17 ENCOUNTER — OFFICE VISIT - HEALTHEAST (OUTPATIENT)
Dept: FAMILY MEDICINE | Facility: CLINIC | Age: 70
End: 2020-09-17

## 2020-09-17 DIAGNOSIS — F30.9 BIPOLAR I DISORDER, SINGLE MANIC EPISODE (H): ICD-10-CM

## 2020-09-17 DIAGNOSIS — G89.29 CHRONIC MIDLINE LOW BACK PAIN WITHOUT SCIATICA: ICD-10-CM

## 2020-09-17 DIAGNOSIS — F41.9 ANXIETY: ICD-10-CM

## 2020-09-17 DIAGNOSIS — M54.50 CHRONIC MIDLINE LOW BACK PAIN WITHOUT SCIATICA: ICD-10-CM

## 2020-10-08 ENCOUNTER — OFFICE VISIT - HEALTHEAST (OUTPATIENT)
Dept: FAMILY MEDICINE | Facility: CLINIC | Age: 70
End: 2020-10-08

## 2020-10-08 DIAGNOSIS — G89.29 CHRONIC MIDLINE LOW BACK PAIN WITHOUT SCIATICA: ICD-10-CM

## 2020-10-08 DIAGNOSIS — M54.50 CHRONIC MIDLINE LOW BACK PAIN WITHOUT SCIATICA: ICD-10-CM

## 2020-10-08 DIAGNOSIS — F30.9 BIPOLAR I DISORDER, SINGLE MANIC EPISODE (H): ICD-10-CM

## 2020-10-27 ENCOUNTER — OFFICE VISIT - HEALTHEAST (OUTPATIENT)
Dept: FAMILY MEDICINE | Facility: CLINIC | Age: 70
End: 2020-10-27

## 2020-10-27 DIAGNOSIS — F30.9 BIPOLAR I DISORDER, SINGLE MANIC EPISODE (H): ICD-10-CM

## 2020-10-27 DIAGNOSIS — G89.29 CHRONIC MIDLINE LOW BACK PAIN WITHOUT SCIATICA: ICD-10-CM

## 2020-10-27 DIAGNOSIS — E11.00 TYPE 2 DIABETES MELLITUS WITH HYPEROSMOLARITY WITHOUT COMA, UNSPECIFIED WHETHER LONG TERM INSULIN USE (H): ICD-10-CM

## 2020-10-27 DIAGNOSIS — E66.01 MORBID OBESITY (H): ICD-10-CM

## 2020-10-27 DIAGNOSIS — M54.50 CHRONIC MIDLINE LOW BACK PAIN WITHOUT SCIATICA: ICD-10-CM

## 2020-10-30 ENCOUNTER — COMMUNICATION - HEALTHEAST (OUTPATIENT)
Dept: FAMILY MEDICINE | Facility: CLINIC | Age: 70
End: 2020-10-30

## 2020-10-30 DIAGNOSIS — E11.9 DM (DIABETES MELLITUS) (H): ICD-10-CM

## 2020-11-14 ENCOUNTER — COMMUNICATION - HEALTHEAST (OUTPATIENT)
Dept: FAMILY MEDICINE | Facility: CLINIC | Age: 70
End: 2020-11-14

## 2020-11-14 DIAGNOSIS — Z79.4 TYPE 2 DIABETES MELLITUS WITH HYPERGLYCEMIA, WITH LONG-TERM CURRENT USE OF INSULIN (H): ICD-10-CM

## 2020-11-14 DIAGNOSIS — E11.65 TYPE 2 DIABETES MELLITUS WITH HYPERGLYCEMIA, WITH LONG-TERM CURRENT USE OF INSULIN (H): ICD-10-CM

## 2020-12-07 ENCOUNTER — COMMUNICATION - HEALTHEAST (OUTPATIENT)
Dept: FAMILY MEDICINE | Facility: CLINIC | Age: 70
End: 2020-12-07

## 2020-12-07 ENCOUNTER — OFFICE VISIT - HEALTHEAST (OUTPATIENT)
Dept: FAMILY MEDICINE | Facility: CLINIC | Age: 70
End: 2020-12-07

## 2020-12-07 DIAGNOSIS — E11.00 TYPE 2 DIABETES MELLITUS WITH HYPEROSMOLARITY WITHOUT COMA, UNSPECIFIED WHETHER LONG TERM INSULIN USE (H): ICD-10-CM

## 2020-12-07 DIAGNOSIS — G89.29 CHRONIC MIDLINE LOW BACK PAIN WITHOUT SCIATICA: ICD-10-CM

## 2020-12-07 DIAGNOSIS — M54.50 CHRONIC MIDLINE LOW BACK PAIN WITHOUT SCIATICA: ICD-10-CM

## 2020-12-07 DIAGNOSIS — M79.2 NEUROPATHIC PAIN: ICD-10-CM

## 2020-12-07 DIAGNOSIS — F30.9 BIPOLAR I DISORDER, SINGLE MANIC EPISODE (H): ICD-10-CM

## 2020-12-07 DIAGNOSIS — Z79.4 TYPE 2 DIABETES MELLITUS WITH HYPERGLYCEMIA, WITH LONG-TERM CURRENT USE OF INSULIN (H): ICD-10-CM

## 2020-12-07 DIAGNOSIS — E11.65 TYPE 2 DIABETES MELLITUS WITH HYPERGLYCEMIA, WITH LONG-TERM CURRENT USE OF INSULIN (H): ICD-10-CM

## 2020-12-07 RX ORDER — FLASH GLUCOSE SCANNING READER
1 EACH MISCELLANEOUS EVERY 8 HOURS
Qty: 6 EACH | Refills: 6 | Status: SHIPPED | OUTPATIENT
Start: 2020-12-07

## 2020-12-09 ENCOUNTER — COMMUNICATION - HEALTHEAST (OUTPATIENT)
Dept: FAMILY MEDICINE | Facility: CLINIC | Age: 70
End: 2020-12-09

## 2020-12-09 DIAGNOSIS — E11.65 TYPE 2 DIABETES MELLITUS WITH HYPERGLYCEMIA, WITH LONG-TERM CURRENT USE OF INSULIN (H): ICD-10-CM

## 2020-12-09 DIAGNOSIS — Z79.4 TYPE 2 DIABETES MELLITUS WITH HYPERGLYCEMIA, WITH LONG-TERM CURRENT USE OF INSULIN (H): ICD-10-CM

## 2020-12-10 RX ORDER — FLASH GLUCOSE SENSOR
KIT MISCELLANEOUS
Qty: 1 KIT | Refills: 11 | Status: SHIPPED | OUTPATIENT
Start: 2020-12-10

## 2020-12-28 ENCOUNTER — OFFICE VISIT - HEALTHEAST (OUTPATIENT)
Dept: FAMILY MEDICINE | Facility: CLINIC | Age: 70
End: 2020-12-28

## 2020-12-28 DIAGNOSIS — G89.29 CHRONIC MIDLINE LOW BACK PAIN WITHOUT SCIATICA: ICD-10-CM

## 2020-12-28 DIAGNOSIS — M54.50 CHRONIC MIDLINE LOW BACK PAIN WITHOUT SCIATICA: ICD-10-CM

## 2020-12-28 DIAGNOSIS — F30.9 BIPOLAR I DISORDER, SINGLE MANIC EPISODE (H): ICD-10-CM

## 2020-12-28 DIAGNOSIS — E11.00 TYPE 2 DIABETES MELLITUS WITH HYPEROSMOLARITY WITHOUT COMA, UNSPECIFIED WHETHER LONG TERM INSULIN USE (H): ICD-10-CM

## 2020-12-28 DIAGNOSIS — M79.2 NEUROPATHIC PAIN: ICD-10-CM

## 2020-12-30 ENCOUNTER — COMMUNICATION - HEALTHEAST (OUTPATIENT)
Dept: FAMILY MEDICINE | Facility: CLINIC | Age: 70
End: 2020-12-30

## 2021-01-05 ENCOUNTER — COMMUNICATION - HEALTHEAST (OUTPATIENT)
Dept: FAMILY MEDICINE | Facility: CLINIC | Age: 71
End: 2021-01-05

## 2021-01-05 DIAGNOSIS — E11.9 DIABETES MELLITUS, TYPE 2 (H): ICD-10-CM

## 2021-01-05 RX ORDER — SYRINGE-NEEDLE,INSULIN,0.5 ML 27GX1/2"
SYRINGE, EMPTY DISPOSABLE MISCELLANEOUS
Qty: 100 EACH | Refills: 2 | Status: SHIPPED | OUTPATIENT
Start: 2021-01-05

## 2021-01-06 ENCOUNTER — AMBULATORY - HEALTHEAST (OUTPATIENT)
Dept: PALLIATIVE MEDICINE | Facility: OTHER | Age: 71
End: 2021-01-06

## 2021-01-06 DIAGNOSIS — M47.816 LUMBAR FACET ARTHROPATHY: ICD-10-CM

## 2021-01-09 ENCOUNTER — COMMUNICATION - HEALTHEAST (OUTPATIENT)
Dept: NURSING | Facility: CLINIC | Age: 71
End: 2021-01-09

## 2021-01-09 DIAGNOSIS — N13.8 ENLARGED PROSTATE WITH URINARY OBSTRUCTION: ICD-10-CM

## 2021-01-09 DIAGNOSIS — N40.1 ENLARGED PROSTATE WITH URINARY OBSTRUCTION: ICD-10-CM

## 2021-01-09 RX ORDER — TAMSULOSIN HYDROCHLORIDE 0.4 MG/1
0.4 CAPSULE ORAL DAILY
Qty: 90 CAPSULE | Refills: 3 | Status: SHIPPED | OUTPATIENT
Start: 2021-01-09 | End: 2021-08-23

## 2021-01-19 ENCOUNTER — COMMUNICATION - HEALTHEAST (OUTPATIENT)
Dept: ADMINISTRATIVE | Facility: CLINIC | Age: 71
End: 2021-01-19

## 2021-01-30 ENCOUNTER — COMMUNICATION - HEALTHEAST (OUTPATIENT)
Dept: NURSING | Facility: CLINIC | Age: 71
End: 2021-01-30

## 2021-01-30 DIAGNOSIS — E78.5 DYSLIPIDEMIA: ICD-10-CM

## 2021-01-31 RX ORDER — ATORVASTATIN CALCIUM 20 MG/1
TABLET, FILM COATED ORAL
Qty: 90 TABLET | Refills: 3 | Status: SHIPPED | OUTPATIENT
Start: 2021-01-31 | End: 2021-08-23

## 2021-02-05 ENCOUNTER — COMMUNICATION - HEALTHEAST (OUTPATIENT)
Dept: ADMINISTRATIVE | Facility: CLINIC | Age: 71
End: 2021-02-05

## 2021-02-08 ENCOUNTER — AMBULATORY - HEALTHEAST (OUTPATIENT)
Dept: FAMILY MEDICINE | Facility: CLINIC | Age: 71
End: 2021-02-08

## 2021-02-08 DIAGNOSIS — M79.2 NEUROPATHIC PAIN: ICD-10-CM

## 2021-02-08 RX ORDER — PREGABALIN 50 MG/1
50 CAPSULE ORAL 2 TIMES DAILY
Qty: 60 CAPSULE | Refills: 1 | Status: SHIPPED | OUTPATIENT
Start: 2021-02-08 | End: 2021-08-23

## 2021-02-10 ENCOUNTER — OFFICE VISIT - HEALTHEAST (OUTPATIENT)
Dept: FAMILY MEDICINE | Facility: CLINIC | Age: 71
End: 2021-02-10

## 2021-02-10 DIAGNOSIS — M54.50 CHRONIC MIDLINE LOW BACK PAIN WITHOUT SCIATICA: ICD-10-CM

## 2021-02-10 DIAGNOSIS — M79.2 NEUROPATHIC PAIN: ICD-10-CM

## 2021-02-10 DIAGNOSIS — F30.9 BIPOLAR I DISORDER, SINGLE MANIC EPISODE (H): ICD-10-CM

## 2021-02-10 DIAGNOSIS — G89.29 CHRONIC MIDLINE LOW BACK PAIN WITHOUT SCIATICA: ICD-10-CM

## 2021-02-20 ENCOUNTER — COMMUNICATION - HEALTHEAST (OUTPATIENT)
Dept: FAMILY MEDICINE | Facility: CLINIC | Age: 71
End: 2021-02-20

## 2021-02-20 DIAGNOSIS — F30.9 BIPOLAR I DISORDER, SINGLE MANIC EPISODE (H): ICD-10-CM

## 2021-02-22 RX ORDER — QUETIAPINE FUMARATE 400 MG/1
TABLET, FILM COATED ORAL
Qty: 30 TABLET | Refills: 11 | Status: SHIPPED | OUTPATIENT
Start: 2021-02-22 | End: 2021-08-23

## 2021-02-27 ENCOUNTER — AMBULATORY - HEALTHEAST (OUTPATIENT)
Dept: NURSING | Facility: CLINIC | Age: 71
End: 2021-02-27

## 2021-03-03 ENCOUNTER — OFFICE VISIT - HEALTHEAST (OUTPATIENT)
Dept: FAMILY MEDICINE | Facility: CLINIC | Age: 71
End: 2021-03-03

## 2021-03-03 DIAGNOSIS — F30.9 BIPOLAR I DISORDER, SINGLE MANIC EPISODE (H): ICD-10-CM

## 2021-03-03 DIAGNOSIS — E11.00 TYPE 2 DIABETES MELLITUS WITH HYPEROSMOLARITY WITHOUT COMA, UNSPECIFIED WHETHER LONG TERM INSULIN USE (H): ICD-10-CM

## 2021-03-03 DIAGNOSIS — M79.2 NEUROPATHIC PAIN: ICD-10-CM

## 2021-03-09 ENCOUNTER — OFFICE VISIT - HEALTHEAST (OUTPATIENT)
Dept: FAMILY MEDICINE | Facility: CLINIC | Age: 71
End: 2021-03-09

## 2021-03-09 DIAGNOSIS — G89.29 CHRONIC MIDLINE LOW BACK PAIN WITHOUT SCIATICA: ICD-10-CM

## 2021-03-09 DIAGNOSIS — F25.0 SCHIZOAFFECTIVE DISORDER, BIPOLAR TYPE (H): ICD-10-CM

## 2021-03-09 DIAGNOSIS — E11.00 TYPE 2 DIABETES MELLITUS WITH HYPEROSMOLARITY WITHOUT COMA, UNSPECIFIED WHETHER LONG TERM INSULIN USE (H): ICD-10-CM

## 2021-03-09 DIAGNOSIS — M54.50 CHRONIC MIDLINE LOW BACK PAIN WITHOUT SCIATICA: ICD-10-CM

## 2021-03-09 ASSESSMENT — MIFFLIN-ST. JEOR: SCORE: 1987.3

## 2021-03-10 ENCOUNTER — COMMUNICATION - HEALTHEAST (OUTPATIENT)
Dept: ADMINISTRATIVE | Facility: CLINIC | Age: 71
End: 2021-03-10

## 2021-03-10 DIAGNOSIS — E11.00 TYPE 2 DIABETES MELLITUS WITH HYPEROSMOLARITY WITHOUT COMA, UNSPECIFIED WHETHER LONG TERM INSULIN USE (H): ICD-10-CM

## 2021-03-15 ENCOUNTER — COMMUNICATION - HEALTHEAST (OUTPATIENT)
Dept: FAMILY MEDICINE | Facility: CLINIC | Age: 71
End: 2021-03-15

## 2021-03-15 DIAGNOSIS — I10 ESSENTIAL HYPERTENSION: ICD-10-CM

## 2021-03-15 DIAGNOSIS — I25.10 CORONARY ARTERY DISEASE: ICD-10-CM

## 2021-03-15 RX ORDER — ISOSORBIDE MONONITRATE 30 MG/1
30 TABLET, EXTENDED RELEASE ORAL DAILY
Qty: 90 TABLET | Refills: 3 | Status: SHIPPED | OUTPATIENT
Start: 2021-03-15 | End: 2021-08-23

## 2021-03-15 RX ORDER — ATENOLOL 50 MG/1
TABLET ORAL
Qty: 90 TABLET | Refills: 3 | Status: SHIPPED | OUTPATIENT
Start: 2021-03-15 | End: 2021-08-23

## 2021-03-15 RX ORDER — LISINOPRIL 40 MG/1
40 TABLET ORAL DAILY
Qty: 90 TABLET | Refills: 3 | Status: SHIPPED | OUTPATIENT
Start: 2021-03-15 | End: 2021-08-23

## 2021-03-16 ENCOUNTER — COMMUNICATION - HEALTHEAST (OUTPATIENT)
Dept: EDUCATION SERVICES | Facility: CLINIC | Age: 71
End: 2021-03-16

## 2021-03-20 ENCOUNTER — AMBULATORY - HEALTHEAST (OUTPATIENT)
Dept: NURSING | Facility: CLINIC | Age: 71
End: 2021-03-20

## 2021-03-24 ENCOUNTER — COMMUNICATION - HEALTHEAST (OUTPATIENT)
Dept: FAMILY MEDICINE | Facility: CLINIC | Age: 71
End: 2021-03-24

## 2021-04-04 ENCOUNTER — COMMUNICATION - HEALTHEAST (OUTPATIENT)
Dept: NURSING | Facility: CLINIC | Age: 71
End: 2021-04-04

## 2021-04-04 DIAGNOSIS — K21.9 GASTROESOPHAGEAL REFLUX DISEASE: ICD-10-CM

## 2021-04-12 ENCOUNTER — OFFICE VISIT - HEALTHEAST (OUTPATIENT)
Dept: EDUCATION SERVICES | Facility: CLINIC | Age: 71
End: 2021-04-12

## 2021-04-12 DIAGNOSIS — E11.00 TYPE 2 DIABETES MELLITUS WITH HYPEROSMOLARITY WITHOUT COMA, UNSPECIFIED WHETHER LONG TERM INSULIN USE (H): ICD-10-CM

## 2021-04-21 ENCOUNTER — COMMUNICATION - HEALTHEAST (OUTPATIENT)
Dept: FAMILY MEDICINE | Facility: CLINIC | Age: 71
End: 2021-04-21

## 2021-05-10 ENCOUNTER — OFFICE VISIT - HEALTHEAST (OUTPATIENT)
Dept: EDUCATION SERVICES | Facility: CLINIC | Age: 71
End: 2021-05-10

## 2021-05-10 DIAGNOSIS — E11.00 TYPE 2 DIABETES MELLITUS WITH HYPEROSMOLARITY WITHOUT COMA, UNSPECIFIED WHETHER LONG TERM INSULIN USE (H): ICD-10-CM

## 2021-05-10 LAB — HBA1C MFR BLD: 10 %

## 2021-05-24 ENCOUNTER — COMMUNICATION - HEALTHEAST (OUTPATIENT)
Dept: FAMILY MEDICINE | Facility: CLINIC | Age: 71
End: 2021-05-24

## 2021-05-26 ASSESSMENT — PATIENT HEALTH QUESTIONNAIRE - PHQ9
SUM OF ALL RESPONSES TO PHQ QUESTIONS 1-9: 14
SUM OF ALL RESPONSES TO PHQ QUESTIONS 1-9: 10

## 2021-05-27 NOTE — TELEPHONE ENCOUNTER
Refill Approved    Rx renewed per Medication Renewal Policy. Medication was last renewed on 4/2/18.    Maira Huerta, [facility] Triage/Med Refill 4/6/2019     Requested Prescriptions   Pending Prescriptions Disp Refills     ONETOUCH ULTRA BLUE TEST STRIP strips [Pharmacy Med Name: OneTouch Ultra Blue In Vitro Strip]  2     Sig: TEST 3 times per day.    Diabetic Supplies Refill Protocol Passed - 4/5/2019  7:02 AM       Passed - Visit with PCP or prescribing provider visit in last 6 months    Last office visit with prescriber/PCP: 2/11/2019 Mahad Morales MD OR same dept: 2/11/2019 Mahad Morales MD OR same specialty: 2/11/2019 Mahad Morales MD  Last physical: 9/13/2017 Last MTM visit: Visit date not found   Next visit within 3 mo: Visit date not found  Next physical within 3 mo: Visit date not found  Prescriber OR PCP: Mahad Morales MD  Last diagnosis associated with med order: 1. Type 2 diabetes mellitus (H)  - ONETOUCH ULTRA BLUE TEST STRIP strips [Pharmacy Med Name: OneTouch Ultra Blue In Vitro Strip]; TEST 3 times per day.; Refill: 2    If protocol passes may refill for 12 months if within 3 months of last provider visit (or a total of 15 months).            Passed - A1C in last 6 months    Hemoglobin A1c   Date Value Ref Range Status   02/11/2019 8.8 (H) 3.5 - 6.0 % Final

## 2021-05-29 ENCOUNTER — RECORDS - HEALTHEAST (OUTPATIENT)
Dept: ADMINISTRATIVE | Facility: CLINIC | Age: 71
End: 2021-05-29

## 2021-05-29 NOTE — PATIENT INSTRUCTIONS - HE
1. Continue current medications, although can discontinue Plavix at this point.  2. Follow up in 1 year

## 2021-05-29 NOTE — TELEPHONE ENCOUNTER
Patient does not have a diabetic eye exam report in chart.    Spouse answered phone and wondered if appointment had been changed.  Confirmed that next PCP appointment 07/16/169 at 2 PM with 1:45 PM arrival time.  After inquiring reason for call, spouse replied they go to [facility] in [place].

## 2021-05-30 ENCOUNTER — RECORDS - HEALTHEAST (OUTPATIENT)
Dept: ADMINISTRATIVE | Facility: CLINIC | Age: 71
End: 2021-05-30

## 2021-05-30 VITALS — WEIGHT: 246 LBS | HEIGHT: 73 IN | BODY MASS INDEX: 32.6 KG/M2

## 2021-05-30 VITALS — HEIGHT: 73 IN | WEIGHT: 251 LBS | BODY MASS INDEX: 33.27 KG/M2

## 2021-05-30 VITALS — BODY MASS INDEX: 33.4 KG/M2 | HEIGHT: 73 IN | WEIGHT: 252 LBS

## 2021-05-30 VITALS — HEIGHT: 73 IN | WEIGHT: 249.5 LBS | BODY MASS INDEX: 33.07 KG/M2

## 2021-05-30 VITALS — HEIGHT: 73 IN | BODY MASS INDEX: 33.4 KG/M2 | WEIGHT: 252 LBS

## 2021-05-30 NOTE — TELEPHONE ENCOUNTER
Refill Approved    Rx renewed per Medication Renewal Policy. Medication was last renewed on 12/12/18.    Bella Hirsch, [facility] Triage/Med Refill 6/29/2019     Requested Prescriptions   Pending Prescriptions Disp Refills     DULoxetine 40 mg CpDR [Pharmacy Med Name: DULoxetine HCl Oral Capsule Delayed Release Particles 40 MG] 30 capsule 5     Sig: Take 40 mg (1 capsule) by mouth every morning.       Tricyclics/Misc Antidepressant/Antianxiety Meds Refill Protocol Passed - 6/29/2019  7:03 AM        Passed - PCP or prescribing provider visit in last year     Last office visit with prescriber/PCP: 2/11/2019 Mahad Morales MD OR same dept: 2/11/2019 Mahad Morales MD OR same specialty: 2/11/2019 Mahad Morales MD  Last physical: 9/13/2017 Last MTM visit: Visit date not found   Next visit within 3 mo: Visit date not found  Next physical within 3 mo: Visit date not found  Prescriber OR PCP: Mahad Morales MD  Last diagnosis associated with med order: 1. Bipolar Disorder  - DULoxetine 40 mg CpDR [Pharmacy Med Name: DULoxetine HCl Oral Capsule Delayed Release Particles 40 MG]; Take 40 mg (1 capsule) by mouth every morning.  Dispense: 30 capsule; Refill: 5    If protocol passes may refill for 12 months if within 3 months of last provider visit (or a total of 15 months).

## 2021-05-30 NOTE — PROGRESS NOTES
ASSESMENT AND PLAN:  Diagnoses and all orders for this visit:    Bipolar Disorder with current manic exacerbation  Unfortunately he did not tolerate the Depakote as detailed below, this was discontinued.  I encouraged him not to continue to take the Cymbalta.  Will try to use acetaminophen for the joint pain as detailed below.  We are going to try adding a small morning dose of quetiapine to try to help with the manic exacerbation, he will continue to take 400 mg at bedtime.  Reviewed the risks and benefits of the medication with the patient and his wife.  Close follow-up here in the clinic, 1 month for routine, sooner if not improving.  -     QUEtiapine (SEROQUEL) 100 MG tablet; Take 1-2 tablets (100-200 mg total) by mouth every morning. Continue to take 400 mg at bedtime (seperate rx)  Dispense: 60 tablet; Refill: 6    Primary osteoarthritis involving multiple joints  Will use acetaminophen 500 mg twice daily, can take a third dose if needed.  Discouraged the use of Cymbalta given that it could be a contributing factor to his manic exacerbation.    Type 2 diabetes mellitus with other circulatory complication, with long-term current use of insulin (H)  We will need to address his worsening diabetes control further at a future visit once his acute deng has resolved.            SUBJECTIVE: 69-year-old male comes in today for follow-up.  Currently experiencing a significant manic episode.  Please see previous clinic notes for details.  Unfortunately, he did not tolerate the Depakote.  It made him feel very diffusely weak.  He only took it for 2 days and then discontinued it.  Once he stopped the Depakote, he restarted Cymbalta.  He takes the Cymbalta mainly because it makes his joint pain feel better.  He has diffuse pain in the back as well as some hand pain and tightness in his hand joints.  He also has bilateral knee pain.  Patient has a history of playing multiple athletic sports including hockey up until the age  of 38.  The pain is mild to moderate and is usually controlled in the past with acetaminophen.  Currently he is not taking acetaminophen.  Patient reports that he sleeps very well at night and his wife confirms this.  His feeling of being relaxed after taking the quetiapine.  He and his wife noticed that does seem to calm him and reduce his racing thoughts and pressured speech.    Past Medical History:   Diagnosis Date     Arthritis      Asthma      Bipolar affective disorder (H)      BPH (benign prostatic hyperplasia) 01/06/2015     CAD (coronary artery disease) 07/20/2015     Diabetes mellitus (H)      Dyslipidemia, goal LDL below 70 07/20/2015     Eczema      Essential hypertension      Tardive dyskinesia      Patient Active Problem List   Diagnosis     Subacute dyskinesia due to drug     Dyslipidemia, goal LDL below 70     Essential hypertension     Type 2 diabetes mellitus (H)     Bipolar Disorder     Enlarged prostate with urinary obstruction     Coronary artery disease     Erectile dysfunction     Gait abnormality     Gastroesophageal reflux disease without esophagitis     Chronic back pain     Neuropathic pain     Primary osteoarthritis involving multiple joints     Current Outpatient Medications   Medication Sig Dispense Refill     acetaminophen (TYLENOL) 500 MG tablet Take 500 mg by mouth 2 (two) times a day.       aspirin 81 MG EC tablet Take 81 mg by mouth daily.       atenolol (TENORMIN) 50 MG tablet TAKE ONE TABLET BY MOUTH EVERY NIGHT AT BEDTIME 90 tablet 3     atorvastatin (LIPITOR) 20 MG tablet Take 1 tablet (20 mg total) by mouth daily. 90 tablet 3     blood glucose meter (GLUCOMETER) OneTouch Ultra. Dispense glucometer brand per patient's insurance at pharmacy discretion. Pt testing 3 times per day. 1 each 0     cholecalciferol, vitamin D3, 1,000 unit tablet Take 2,000 Units by mouth every other day. Takes at 1200       clopidogrel (PLAVIX) 75 mg tablet TAKE ONE TABLET BY MOUTH ONE TIME DAILY  90  tablet 1     generic lancets Dispense brand per patient's insurance at pharmacy discretion. TESTS 3 times per day 350 each 3     insulin needles, disposable, 31 Ndle 3 (three) times a day. For insulin injections.       insulin NPH-insulin regular (NOVOLIN 70/30 U-100 INSULIN) 100 unit/mL (70-30) injection Inject 85-90 Units under the skin 2 (two) times a day before meals. 50 mL 11     insulin syringe-needle U-100 1 mL 31 gauge x 5/16 Syrg USE AS DIRECTED ONCE DAILY 100 each 2     isosorbide mononitrate (IMDUR) 30 MG 24 hr tablet TAKE ONE TABLET BY MOUTH ONE TIME DAILY 90 tablet 3     lisinopril (PRINIVIL,ZESTRIL) 40 MG tablet Take 1 tablet (40 mg total) by mouth daily. 90 tablet 3     metFORMIN (GLUCOPHAGE) 1000 MG tablet TAKE ONE TABLET BY MOUTH 2 TIMES A DAY WITH MEALS 180 tablet 3     nitroglycerin (NITROSTAT) 0.4 MG SL tablet Place 1 tablet (0.4 mg total) under the tongue every 5 (five) minutes as needed for chest pain. Max of 3 doses in 15 minutes. 25 tablet 1     omeprazole (PRILOSEC) 20 MG capsule Take 1 capsule (20 mg total) by mouth daily before breakfast. 90 capsule 3     ONETOUCH ULTRA BLUE TEST STRIP strips TEST 3 times per day. 350 strip 3     QUEtiapine (SEROQUEL) 400 MG tablet Take 1 tablet (400 mg total) by mouth at bedtime. 30 tablet 6     tamsulosin (FLOMAX) 0.4 mg cap Take 1 capsule (0.4 mg total) by mouth daily. 90 capsule 3     QUEtiapine (SEROQUEL) 100 MG tablet Take 1-2 tablets (100-200 mg total) by mouth every morning. Continue to take 400 mg at bedtime (seperate rx) 60 tablet 6     No current facility-administered medications for this visit.      Social History     Tobacco Use   Smoking Status Never Smoker   Smokeless Tobacco Never Used       OBJECTICE: /70 (Patient Site: Left Arm, Patient Position: Sitting)   Pulse 80   Temp 98  F (36.7  C) (Oral)   Resp 24   SpO2 95%      No results found for this or any previous visit (from the past 24 hour(s)).     GEN-alert, in no acute  distress   CV-regular rate and rhythm   RESP-lungs clear to auscultation   Musculoskeletal-no joint effusions.   Psychiatric-appearance is well-groomed, speech is pressured but somewhat improved compared to last visit, mood is mildly depressed, affect is elevated, thought content negative for suicidal or homicidal ideation, thought processing positive for some tangential thinking.  Some mild paranoid thinking.    Mahad Morales

## 2021-05-30 NOTE — PROGRESS NOTES
ASSESMENT AND PLAN:  Diagnoses and all orders for this visit:    Manic episode (H) with known history of bipolar Disorder  Extensive counseling today with the patient and his wife.  Counseled the patient that the medication prescribed below may help reduce his body pain.  I am also hopeful that it will help reduce his manic symptoms.  Patient is in agreement with taking the medication because of the possibility that it will help with both.  He does have some insight into how his recent behavior has been stressful for his wife and he wants to do something to help with this.  We reviewed the risks and benefits of the medication.  Patient refuses psychiatric care as detailed below.  Therefore, close follow-up with me here in the clinic in about 10 days for a recheck.  We discussed indications for more urgent follow-up.  Continue quetiapine at bedtime.  -     divalproex (DEPAKOTE ER) 500 MG 24 hour tablet; Take 1 tablet (500 mg total) by mouth daily.  Dispense: 30 tablet; Refill: 11    Diabetes mellitus, type 2 (H)  -     Glycosylated Hemoglobin A1c done today does show worsening.  Medication review and counseling done today with the patient.  However, I am not going to change medications because of the acute issue with his deng as detailed above.  We will address this further at his follow-up visit.  -     Microalbumin, Random Urine  -     insulin syringe-needle U-100 1 mL 31 gauge x 5/16 Syrg; USE AS DIRECTED ONCE DAILY  Dispense: 100 each; Refill: 2    Coronary artery disease due to calcified coronary lesion  Reviewed the most recent cardiology consultation with the patient and his wife.  We discussed the question around discontinuation of Plavix.  After the discussion, the patient wishes to continue with his Plavix.  Refill the nitroglycerin as ordered below.  We discussed cardiology follow-up.  -     nitroglycerin (NITROSTAT) 0.4 MG SL tablet; Place 1 tablet (0.4 mg total) under the tongue every 5 (five) minutes  as needed for chest pain. Max of 3 doses in 15 minutes.  Dispense: 25 tablet; Refill: 1    Polyarthritis  I am hopeful the patient will get some pain relief with the Depakote.  Work-up as prescribed below.  Most likely cause is osteoarthritis.  -     Lyme Antibody Cascade  -     Rheumatoid Factor Quant  -     Antinuclear Antibody (MARCOS) Cascade          SUBJECTIVE: 69-year-old male here for follow-up on his diabetes.  He also has questions related to his recent cardiology follow-up.  There has been a discussion about whether to discontinue Plavix.  Plavix is not because the patient any problems.  He is not having any bleeding from any site.  He is inclined to continue taking the medication.  Patient has concerns about pain in his body.  Reports that both ankles, both knees, both hands have been achy with moderate pain diffusely.  The pain does get worse with some physical activities and he has noticed some mild intermittent swelling in his hands.  Over-the-counter analgesics have not offered much pain relief.  Patient had been on Celebrex and this did not offer adequate pain relief.  The main concern I have today is obvious exacerbation of his bipolar disorder.  Patient is overtly manic.  He has flight of ideas, he can stop talking, he talks repeatedly about mistrust of almost everyone around him.  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  The patient does today expressed repeatedly racist ideas about people of , , and  backgrounds.  This is very out of character for him.  At other times in the discussion today he acknowledges that in the past he had March for civil rights and had advocated for his daughter's significant other who is of [place] background.  His wife reports that she feels overwhelmed and exhausted by the constant barrage of negative thinking as demonstrated by his verbalization of this thinking over the last few months and  the problem seems to be getting worse.  It is difficult for her to go places with him, such as the dental office.  She reports that he has been getting in unnecessary verbal arguments with measures of their extended family.  The patient has been taking his quetiapine at bedtime and he is sleeping very well.  Night times are the only peaceful time for the patient and his wife.  The difficult problem with the upstairs neighbor details in previous notes has resolved.  Those people moved out.  Patient says repeatedly that he will not take anymore psychiatric medications and that he will not see a psychiatrist.    Review of systems: No chest pain.  No shortness of breath.  No vomiting or diarrhea.  No fevers.  Remainder of review of systems is as above or negative.      Past Medical History:   Diagnosis Date     Arthritis      Asthma      Bipolar affective disorder (H)      BPH (benign prostatic hyperplasia) 01/06/2015     CAD (coronary artery disease) 07/20/2015     Diabetes mellitus (H)      Dyslipidemia, goal LDL below 70 07/20/2015     Eczema      Essential hypertension      Tardive dyskinesia      Patient Active Problem List   Diagnosis     Subacute dyskinesia due to drug     Dyslipidemia, goal LDL below 70     Essential hypertension     Type 2 diabetes mellitus (H)     Bipolar Disorder     Enlarged prostate with urinary obstruction     Coronary artery disease     Erectile dysfunction     Gait abnormality     Gastroesophageal reflux disease without esophagitis     Chronic back pain     Neuropathic pain     Current Outpatient Medications   Medication Sig Dispense Refill     acetaminophen (TYLENOL) 500 MG tablet Take 500 mg by mouth 2 (two) times a day.       aspirin 81 MG EC tablet Take 81 mg by mouth daily.       atenolol (TENORMIN) 50 MG tablet TAKE ONE TABLET BY MOUTH EVERY NIGHT AT BEDTIME 90 tablet 3     atorvastatin (LIPITOR) 20 MG tablet Take 1 tablet (20 mg total) by mouth daily. 90 tablet 3     blood glucose  meter (GLUCOMETER) OneTouch Ultra. Dispense glucometer brand per patient's insurance at pharmacy discretion. Pt testing 3 times per day. 1 each 0     cholecalciferol, vitamin D3, 1,000 unit tablet Take 2,000 Units by mouth every other day. Takes at 1200       clopidogrel (PLAVIX) 75 mg tablet TAKE ONE TABLET BY MOUTH ONE TIME DAILY  90 tablet 1     generic lancets Dispense brand per patient's insurance at pharmacy discretion. TESTS 3 times per day 350 each 3     insulin needles, disposable, 31 Ndle 3 (three) times a day. For insulin injections.       insulin NPH-insulin regular (NOVOLIN 70/30 U-100 INSULIN) 100 unit/mL (70-30) injection Inject 85-90 Units under the skin 2 (two) times a day before meals. 50 mL 11     isosorbide mononitrate (IMDUR) 30 MG 24 hr tablet TAKE ONE TABLET BY MOUTH ONE TIME DAILY 90 tablet 3     lisinopril (PRINIVIL,ZESTRIL) 40 MG tablet Take 1 tablet (40 mg total) by mouth daily. 90 tablet 3     metFORMIN (GLUCOPHAGE) 1000 MG tablet TAKE ONE TABLET BY MOUTH 2 TIMES A DAY WITH MEALS 180 tablet 3     omeprazole (PRILOSEC) 20 MG capsule Take 1 capsule (20 mg total) by mouth daily before breakfast. 90 capsule 3     ONETOUCH ULTRA BLUE TEST STRIP strips TEST 3 times per day. 350 strip 3     QUEtiapine (SEROQUEL) 400 MG tablet Take 1 tablet (400 mg total) by mouth at bedtime. 30 tablet 6     tamsulosin (FLOMAX) 0.4 mg cap Take 1 capsule (0.4 mg total) by mouth daily. 90 capsule 3     divalproex (DEPAKOTE ER) 500 MG 24 hour tablet Take 1 tablet (500 mg total) by mouth daily. 30 tablet 11     insulin syringe-needle U-100 1 mL 31 gauge x 5/16 Syrg USE AS DIRECTED ONCE DAILY 100 each 2     nitroglycerin (NITROSTAT) 0.4 MG SL tablet Place 1 tablet (0.4 mg total) under the tongue every 5 (five) minutes as needed for chest pain. Max of 3 doses in 15 minutes. 25 tablet 1     No current facility-administered medications for this visit.      Social History     Tobacco Use   Smoking Status Never Smoker  "  Smokeless Tobacco Never Used       OBJECTICE: /74 (Patient Site: Right Arm)   Pulse 73   Temp 97.9  F (36.6  C) (Oral)   Resp 16   Ht 6' 1\" (1.854 m)   Wt (!) 265 lb (120.2 kg)   SpO2 95%   BMI 34.96 kg/m       No results found for this or any previous visit (from the past 24 hour(s)).     GEN-alert, in no acute distress   HEENT-sclera are clear, neck is supple with no palpable mass or lymphadenopathy   CV-regular rate and rhythm with no murmur   RESP-lungs clear to auscultation   Foot exam-normal.  Palpable pedal pulses, no foot ulcers.   Abdomen-soft, nontender.   Psychiatric-appearance is well-groomed, speech is very pressured, affect is elevated, angry at times.  Mood is not depressed.  Thought content negative for suicidal or homicidal ideation.  Thought processing positive for some tangential thinking and some mild paranoid thinking.  Judgment and insight are limited by his acute deng.   Musculoskeletal- no effusions of the joints, no deformity.  Good range of motion.    Mahad Morales          "

## 2021-05-31 VITALS — WEIGHT: 257.25 LBS | HEIGHT: 73 IN | BODY MASS INDEX: 34.09 KG/M2

## 2021-05-31 VITALS — BODY MASS INDEX: 33.8 KG/M2 | WEIGHT: 255 LBS | HEIGHT: 73 IN

## 2021-05-31 VITALS — WEIGHT: 252.5 LBS | HEIGHT: 73 IN | BODY MASS INDEX: 33.46 KG/M2

## 2021-05-31 VITALS — BODY MASS INDEX: 33.2 KG/M2 | HEIGHT: 73 IN | WEIGHT: 250.5 LBS

## 2021-05-31 VITALS — BODY MASS INDEX: 34.29 KG/M2 | HEIGHT: 73 IN | WEIGHT: 258.75 LBS

## 2021-05-31 VITALS — BODY MASS INDEX: 33 KG/M2 | HEIGHT: 73 IN | WEIGHT: 249 LBS

## 2021-05-31 VITALS — WEIGHT: 259 LBS | HEIGHT: 73 IN | BODY MASS INDEX: 34.33 KG/M2

## 2021-05-31 NOTE — TELEPHONE ENCOUNTER
RN cannot approve Refill Request    RN can NOT refill this medication Protocol failed and NO refill given.      Bella Hirsch, [facility] Triage/Med Refill 8/29/2019    Requested Prescriptions   Pending Prescriptions Disp Refills     metFORMIN (GLUCOPHAGE) 1000 MG tablet [Pharmacy Med Name: metFORMIN HCl Oral Tablet 1000 MG] 180 tablet 2     Sig: TAKE ONE TABLET BY MOUTH 2 TIMES A DAY WITH MEALS       Metformin Refill Protocol Failed - 8/28/2019  7:02 AM        Failed - LFT or AST or ALT in last 12 months     Albumin   Date Value Ref Range Status   04/25/2018 4.1 3.5 - 5.0 g/dL Final     Bilirubin, Total   Date Value Ref Range Status   04/25/2018 0.8 0.0 - 1.0 mg/dL Final     Bilirubin, Direct   Date Value Ref Range Status   12/12/2014 0.4 <=0.5 mg/dL Final     Alkaline Phosphatase   Date Value Ref Range Status   04/25/2018 148 (H) 45 - 120 U/L Final     AST   Date Value Ref Range Status   04/25/2018 29 0 - 40 U/L Final     ALT   Date Value Ref Range Status   04/25/2018 45 0 - 45 U/L Final     Protein, Total   Date Value Ref Range Status   04/25/2018 7.1 6.0 - 8.0 g/dL Final                Passed - Blood pressure in last 12 months     BP Readings from Last 1 Encounters:   07/29/19 130/70             Passed - GFR or Serum Creatinine in last 6 months     GFR MDRD Non Af Amer   Date Value Ref Range Status   10/01/2018 >60 >60 mL/min/1.73m2 Final     GFR MDRD Af Amer   Date Value Ref Range Status   10/01/2018 >60 >60 mL/min/1.73m2 Final             Passed - Visit with PCP or prescribing provider visit in last 6 months or next 3 months     Last office visit with prescriber/PCP: 7/29/2019 OR same dept: 7/29/2019 Mahad Morales MD OR same specialty: 7/29/2019 Mahad Morales MD Last physical: Visit date not found Last MTM visit: Visit date not found         Next appt within 3 mo: Visit date not found  Next physical within 3 mo: Visit date not found  Prescriber OR PCP: Mahad Morales MD  Last diagnosis associated with  med order: 1. DM (diabetes mellitus) (H)  - metFORMIN (GLUCOPHAGE) 1000 MG tablet [Pharmacy Med Name: metFORMIN HCl Oral Tablet 1000 MG]; TAKE ONE TABLET BY MOUTH 2 TIMES A DAY WITH MEALS  Dispense: 180 tablet; Refill: 2     If protocol passes may refill for 12 months if within 3 months of last provider visit (or a total of 15 months).           Passed - A1C in last 6 months     Hemoglobin A1c   Date Value Ref Range Status   07/16/2019 10.4 (H) 3.5 - 6.0 % Final               Passed - Microalbumin in last year      Microalbumin, Random Urine   Date Value Ref Range Status   07/16/2019 1.10 0.00 - 1.99 mg/dL Final

## 2021-06-01 VITALS — WEIGHT: 257 LBS | BODY MASS INDEX: 34.06 KG/M2 | HEIGHT: 73 IN

## 2021-06-01 VITALS — WEIGHT: 263.13 LBS | BODY MASS INDEX: 34.95 KG/M2

## 2021-06-01 VITALS — BODY MASS INDEX: 35.5 KG/M2 | WEIGHT: 267.25 LBS

## 2021-06-01 VITALS — WEIGHT: 264.06 LBS | BODY MASS INDEX: 35.08 KG/M2

## 2021-06-01 NOTE — PROGRESS NOTES
ASSESMENT AND PLAN:  Diagnoses and all orders for this visit:    Bipolar Disorder with acute deng and significant agitation  Extensive counseling today with the patient and his wife.  It is a difficult task given the patient's limited insight.  He absolutely refuses my recommendation for him to see a psychiatrist urgently.  He does agree to a trial of lorazepam as prescribed below for the agitation.  We discussed the risks and benefits of the medication and indications for follow-up.  I will call the patient and his wife in about a week to check in on how things are going.  -     LORazepam (ATIVAN) 0.5 MG tablet; Take 1 tablet (0.5 mg total) by mouth 2 (two) times a day as needed for anxiety/agitation.  Dispense: 60 tablet; Refill: 0          SUBJECTIVE: 69-year-old male here for follow-up.  Since last visit, he stopped taking the additional dose of quetiapine that had been recommended in the morning because it was making him feel too tired.  His wife agrees that for the 3 days that he took it in the morning it made him extra sleepy and did not really seem to help with the anger and agitation that he has been displaying.  The patient has a recent history of manic exacerbation of his bipolar disorder, severe.  Please see previous notes for details.  Unfortunately, things are not improving.  He is sleeping well at night after taking his quetiapine and the nights are peaceful for the patient and his wife.  However, during the daytime, the patient is constantly agitated and has pressured speech and flight of ideas and perseveration.  In the past, the patient had been a tolerant person and did not express racist ideas.  However, with his current exacerbation of his mental illness he has been focused on the race of people at the dentist office and at the grocery store and everywhere else that he is encountering.  He says derogatory things about black people and  American people that he is encountering in his daily  "life.  He recalls things in the local and national and international current events and news and places the blame on people of different races and religions.  He feels that everyone is out to get him and says bizarre statements such as \"I do not know why all these [place] people are doing this, they are devils\" and \"the Catholics are all devils and pedophiles.\"  When I recommended again that he really needs to see a psychiatrist to help with his complicated mental illness, he says \" I took psychology classes, I know more than they do, they are just going to make me up guinea pig.\"  Things have been very difficult for his wife.  She reports that he is constantly agitated and angry and that he sometimes tracks the anger at her in verbal arguments as well as other members of the family and that is been very difficult for him to have a relationship with any members of his family, including his grandchildren who are startled by his behavior and the words that he says.      Past Medical History:   Diagnosis Date     Arthritis      Asthma      Bipolar affective disorder (H)      BPH (benign prostatic hyperplasia) 01/06/2015     CAD (coronary artery disease) 07/20/2015     Diabetes mellitus (H)      Dyslipidemia, goal LDL below 70 07/20/2015     Eczema      Essential hypertension      Tardive dyskinesia      Patient Active Problem List   Diagnosis     Subacute dyskinesia due to drug     Dyslipidemia, goal LDL below 70     Essential hypertension     Type 2 diabetes mellitus (H)     Bipolar Disorder     Enlarged prostate with urinary obstruction     Coronary artery disease     Erectile dysfunction     Gait abnormality     Gastroesophageal reflux disease without esophagitis     Chronic back pain     Neuropathic pain     Primary osteoarthritis involving multiple joints     Current Outpatient Medications   Medication Sig Dispense Refill     acetaminophen (TYLENOL) 500 MG tablet Take 500 mg by mouth 2 (two) times a day.       " aspirin 81 MG EC tablet Take 81 mg by mouth daily.       atenolol (TENORMIN) 50 MG tablet TAKE ONE TABLET BY MOUTH EVERY NIGHT AT BEDTIME 90 tablet 3     atorvastatin (LIPITOR) 20 MG tablet Take 1 tablet (20 mg total) by mouth daily. 90 tablet 3     blood glucose meter (GLUCOMETER) OneTouch Ultra. Dispense glucometer brand per patient's insurance at pharmacy discretion. Pt testing 3 times per day. 1 each 0     celecoxib (CELEBREX) 200 MG capsule Take 200 mg by mouth as needed for pain.       cholecalciferol, vitamin D3, 1,000 unit tablet Take 2,000 Units by mouth every other day. Takes at 1200       clopidogrel (PLAVIX) 75 mg tablet TAKE ONE TABLET BY MOUTH ONE TIME DAILY  90 tablet 1     generic lancets Dispense brand per patient's insurance at pharmacy discretion. TESTS 3 times per day 350 each 3     insulin needles, disposable, 31 Ndle 3 (three) times a day. For insulin injections.       insulin NPH-insulin regular (NOVOLIN 70/30 U-100 INSULIN) 100 unit/mL (70-30) injection Inject 85-90 Units under the skin 2 (two) times a day before meals. 50 mL 11     insulin syringe-needle U-100 1 mL 31 gauge x 5/16 Syrg USE AS DIRECTED ONCE DAILY 100 each 2     isosorbide mononitrate (IMDUR) 30 MG 24 hr tablet TAKE ONE TABLET BY MOUTH ONE TIME DAILY 90 tablet 3     lisinopril (PRINIVIL,ZESTRIL) 40 MG tablet Take 1 tablet (40 mg total) by mouth daily. 90 tablet 3     metFORMIN (GLUCOPHAGE) 1000 MG tablet TAKE ONE TABLET BY MOUTH 2 TIMES A DAY WITH MEALS 180 tablet 3     nitroglycerin (NITROSTAT) 0.4 MG SL tablet Place 1 tablet (0.4 mg total) under the tongue every 5 (five) minutes as needed for chest pain. Max of 3 doses in 15 minutes. 25 tablet 1     omeprazole (PRILOSEC) 20 MG capsule Take 1 capsule (20 mg total) by mouth daily before breakfast. 90 capsule 3     ONETOUCH ULTRA BLUE TEST STRIP strips TEST 3 times per day. 350 strip 3     QUEtiapine (SEROQUEL) 100 MG tablet Take 1-2 tablets (100-200 mg total) by mouth every  "morning. Continue to take 400 mg at bedtime (seperate rx) 60 tablet 6     QUEtiapine (SEROQUEL) 400 MG tablet Take 1 tablet (400 mg total) by mouth at bedtime. 30 tablet 6     tamsulosin (FLOMAX) 0.4 mg cap Take 1 capsule (0.4 mg total) by mouth daily. 90 capsule 3     LORazepam (ATIVAN) 0.5 MG tablet Take 1 tablet (0.5 mg total) by mouth 2 (two) times a day as needed for anxiety. 60 tablet 0     No current facility-administered medications for this visit.      Social History     Tobacco Use   Smoking Status Never Smoker   Smokeless Tobacco Never Used       OBJECTICE: /72   Pulse 67   Temp 97.5  F (36.4  C) (Oral)   Resp 20   Ht 6' 1\" (1.854 m)   Wt (!) 273 lb (123.8 kg)   SpO2 95%   BMI 36.02 kg/m       No results found for this or any previous visit (from the past 24 hour(s)).     GEN-alert, agitated   CV-regular rate and rhythm   RESP-lungs clear to auscultation   ABDOMINAL-soft and nontender to palpation   Psychiatric-appearance is well-groomed, speech is pressured, affect is agitated, thought content is negative for suicidal or homicidal ideation, thought processing is positive for some paranoid thinking and perseveration, judgment and insight are limited by his disease state.   SKIN-no ulcers or vesicles      Mahad Morales"

## 2021-06-01 NOTE — TELEPHONE ENCOUNTER
RN cannot approve Refill Request    RN can NOT refill this medication .-->  Need PCP to re-instate Plavix due to last provider indicating as a 'Discont'd med'.  Please see phone note below from pt and wife ....  Last office visit:  9/9/2019.    Pina Keene, [facility] Triage/Med Refill 9/23/2019    Requested Prescriptions   Pending Prescriptions Disp Refills     clopidogrel (PLAVIX) 75 mg tablet 90 tablet 1     Sig: Take 1 tablet (75 mg total) by mouth daily.       There is no refill protocol information for this order      Refused Prescriptions Disp Refills     clopidogrel (PLAVIX) 75 mg tablet [Pharmacy Med Name: Clopidogrel Bisulfate Oral Tablet 75 MG] 90 tablet 0     Sig: TAKE ONE TABLET BY MOUTH ONE TIME DAILY       There is no refill protocol information for this order

## 2021-06-01 NOTE — TELEPHONE ENCOUNTER
I talked with the pharmacist, the insurance is rejecting all benzodiazepines.  We need to do an urgent prior authorization on this.  The patient has severe agitation related to out-of-control manic bipolar disorder, see my clinic note from yesterday for details.  Thanks.

## 2021-06-01 NOTE — TELEPHONE ENCOUNTER
Called pt and relayed Dr. Kalpesh rodriguez med and pt was given the option to go off.  Next OV with PCP is 10/29/19 at 220 pm.  Please assist pt with apt when he calls back. Thanks.       Can PCP fit pt in sooner than 10/29 to discuss the Plavix?  See below.

## 2021-06-01 NOTE — TELEPHONE ENCOUNTER
"Pt and wife call again re Plavix refill.  Refill request was dismissed as being a 'discont'd med by Dr Posey.'  Wife states \"He no longer sees Dr Posey.\"  \"At his last visit, Dr Posey gave him the option of continuing Plavix or not.\"  \"Pt tried going off it, but then got chest pain.\"  Therefore pt \"re-started Plavix, and chest pain immediately went away.\"  Pt therefore asked pharmacy for an emergency few-days supply ...  Pt was given four tablets by pharmacy pending refill authorization by PCP.  Refill request is therefore being re-instated for consideration by PCP ....    "

## 2021-06-01 NOTE — TELEPHONE ENCOUNTER
Central PA team  [phone]  Pool: HE PA MED ([id number])          PA has been initiated.       PA form completed and faxed insurance via Cover My Meds     Key:  [id number]     Medication:  LORazepam (ATIVAN) 0.5 MG tablet    Insurance:  [employer]         Response will be received via fax and may take up to 5-10 business days depending on plan

## 2021-06-01 NOTE — TELEPHONE ENCOUNTER
CMT reviewed message thread. Patient wife notified per MD note below. PCP okay with patient continuing the medication. Dr. Posey sent Plavix to [facility]. Patient will  refill. Thanks.

## 2021-06-01 NOTE — TELEPHONE ENCOUNTER
Patient reported disappointment with [facility].  Gave him the [facility] number and he will call to request recent eye exam report be faxed to [facility] clinic at [phone].

## 2021-06-01 NOTE — TELEPHONE ENCOUNTER
"Pt realizes Plavix was discont'd by Dr Posey at their final OV.  Pt stated \"At the last visit, Dr Posey gave the option of continuing Plavix or not.\"  Pt tried going off it.  However pt would like PCP to re-consider reinstating this med, due to its helpfulness in preventing chest pain.  If med refill request is still refused, please call pt and explain or assist in scheduling a med review OV with Dr Morales to re-discuss ....  Thank you-  "

## 2021-06-01 NOTE — TELEPHONE ENCOUNTER
CMT called pharmacy to see if there are Lorazepam alternatives (covered) that pharmacist could recommend for the provider's consideration.     CMT called x 3 (10 rings) and there was endless ringing. No forwarding to voicemail. CMT unable to leave voicemail for pharmacy staff.

## 2021-06-01 NOTE — TELEPHONE ENCOUNTER
Prior Authorization Request  Who s requesting:  Pharmacy  Pharmacy Name and Location: [facility]  Medication Name:   LORazepam (ATIVAN) 0.5 MG tablet 60 tablet 0 9/9/2019  --   Sig - Route: Take 1 tablet (0.5 mg total) by mouth 2 (two) times a day as needed for anxiety. - Oral       Insurance Plan: unknown  Insurance Member ID Number:  unknown  Informed patient that prior authorizations can take up to 10 business days for response:   No  Okay to leave a detailed message: Yes    KEY [id number]

## 2021-06-01 NOTE — TELEPHONE ENCOUNTER
Medication Question or Clarification  Who is calling: Patient  Wife   What medication are you calling about? (include dose and sig)   LORazepam (ATIVAN) 0.5 MG tablet 60 tablet 0 9/9/2019     Sig - Route: Take 1 tablet (0.5 mg total) by mouth 2 (two) times a day as needed for anxiety. - Oral    Who prescribed the medication?: Mahad Morales MD  What is your question/concern?: patient  Wife states that above medication  is not covered by patient insurance. Caller requesting any other recommendations . Please advise.  Pharmacy: [id number]  Okay to leave a detailed message?: No  Site CMT - Please call the pharmacy to obtain any additional needed information.

## 2021-06-02 ENCOUNTER — OFFICE VISIT - HEALTHEAST (OUTPATIENT)
Dept: FAMILY MEDICINE | Facility: CLINIC | Age: 71
End: 2021-06-02

## 2021-06-02 VITALS — WEIGHT: 267 LBS | BODY MASS INDEX: 35.39 KG/M2 | HEIGHT: 73 IN

## 2021-06-02 VITALS — BODY MASS INDEX: 35.12 KG/M2 | HEIGHT: 73 IN | WEIGHT: 265 LBS

## 2021-06-02 VITALS — BODY MASS INDEX: 34.3 KG/M2 | HEIGHT: 73 IN

## 2021-06-02 DIAGNOSIS — G89.29 CHRONIC BILATERAL LOW BACK PAIN WITHOUT SCIATICA: ICD-10-CM

## 2021-06-02 DIAGNOSIS — M54.50 CHRONIC BILATERAL LOW BACK PAIN WITHOUT SCIATICA: ICD-10-CM

## 2021-06-02 DIAGNOSIS — I25.10 CORONARY ARTERY DISEASE INVOLVING NATIVE CORONARY ARTERY OF NATIVE HEART WITHOUT ANGINA PECTORIS: ICD-10-CM

## 2021-06-02 DIAGNOSIS — E11.00 TYPE 2 DIABETES MELLITUS WITH HYPEROSMOLARITY WITHOUT COMA, UNSPECIFIED WHETHER LONG TERM INSULIN USE (H): ICD-10-CM

## 2021-06-02 DIAGNOSIS — F30.9 BIPOLAR I DISORDER, SINGLE MANIC EPISODE (H): ICD-10-CM

## 2021-06-02 RX ORDER — GLUCOSAMINE HCL/CHONDROITIN SU 500-400 MG
1 CAPSULE ORAL 3 TIMES DAILY
Qty: 100 STRIP | Refills: 11 | Status: SHIPPED | OUTPATIENT
Start: 2021-06-02

## 2021-06-02 NOTE — TELEPHONE ENCOUNTER
RN cannot approve Refill Request    RN can NOT refill this medication medication not on med list.       Bella Hirsch, [facility] Triage/Med Refill 10/29/2019    Requested Prescriptions   Pending Prescriptions Disp Refills     DULoxetine 40 mg CpDR [Pharmacy Med Name: DULoxetine HCl Oral Capsule Delayed Release Particles 40 MG] 90 capsule 1     Sig: Take 40 mg (1 capsule) by mouth every morning.       Tricyclics/Misc Antidepressant/Antianxiety Meds Refill Protocol Passed - 10/28/2019  4:42 PM        Passed - PCP or prescribing provider visit in last year     Last office visit with prescriber/PCP: 9/9/2019 Mahad Morales MD OR same dept: 9/9/2019 Mahad Morales MD OR same specialty: 9/9/2019 Mahad Morales MD  Last physical: 9/13/2017 Last MTM visit: Visit date not found   Next visit within 3 mo: Visit date not found  Next physical within 3 mo: Visit date not found  Prescriber OR PCP: Mahad Morales MD  Last diagnosis associated with med order: 1. Bipolar Disorder  - DULoxetine 40 mg CpDR [Pharmacy Med Name: DULoxetine HCl Oral Capsule Delayed Release Particles 40 MG]; Take 40 mg (1 capsule) by mouth every morning.  Dispense: 90 capsule; Refill: 1    If protocol passes may refill for 12 months if within 3 months of last provider visit (or a total of 15 months).

## 2021-06-03 ENCOUNTER — COMMUNICATION - HEALTHEAST (OUTPATIENT)
Dept: FAMILY MEDICINE | Facility: CLINIC | Age: 71
End: 2021-06-03

## 2021-06-03 VITALS — WEIGHT: 265 LBS | BODY MASS INDEX: 35.12 KG/M2 | HEIGHT: 73 IN

## 2021-06-03 VITALS
SYSTOLIC BLOOD PRESSURE: 144 MMHG | WEIGHT: 273 LBS | RESPIRATION RATE: 20 BRPM | HEIGHT: 73 IN | TEMPERATURE: 97.5 F | OXYGEN SATURATION: 95 % | BODY MASS INDEX: 36.18 KG/M2 | HEART RATE: 67 BPM | DIASTOLIC BLOOD PRESSURE: 72 MMHG

## 2021-06-03 VITALS — HEIGHT: 73 IN | WEIGHT: 268 LBS | BODY MASS INDEX: 35.52 KG/M2

## 2021-06-03 DIAGNOSIS — E11.00 TYPE 2 DIABETES MELLITUS WITH HYPEROSMOLARITY WITHOUT COMA, UNSPECIFIED WHETHER LONG TERM INSULIN USE (H): ICD-10-CM

## 2021-06-03 NOTE — TELEPHONE ENCOUNTER
Refill Approved    Rx renewed per Medication Renewal Policy. Medication was last renewed on 12/7/18.    Bella Hirsch, [facility] Triage/Med Refill 11/21/2019     Requested Prescriptions   Pending Prescriptions Disp Refills     isosorbide mononitrate (IMDUR) 30 MG 24 hr tablet [Pharmacy Med Name: Isosorbide Mononitrate ER Oral Tablet Extended Release 24 Hour 30 MG] 90 tablet 2     Sig: TAKE ONE TABLET BY MOUTH ONE TIME DAILY       Isosorbide Refill Protocol Passed - 11/20/2019  7:01 AM        Passed - Visit with PCP or prescribing provider visit in last 6 months or next 3 months     Last office visit with prescriber/PCP: Visit date not found OR same dept: 9/9/2019 Mahad Morales MD OR same specialty: 9/9/2019 Mahad Morales MD Last physical: Visit date not found Last MTM visit: Visit date not found     Next appt within 3 mo: Visit date not found  Next physical within 3 mo: Visit date not found  Prescriber OR PCP: Sri Ruelas MD  Last diagnosis associated with med order: 1. Coronary artery disease  - isosorbide mononitrate (IMDUR) 30 MG 24 hr tablet [Pharmacy Med Name: Isosorbide Mononitrate ER Oral Tablet Extended Release 24 Hour 30 MG]; TAKE ONE TABLET BY MOUTH ONE TIME DAILY  Dispense: 90 tablet; Refill: 2    If protocol passes may refill for 6 months if within 3 months of last provider visit (or a total of 9 months).              Passed - Blood pressure filed in past 12 months     BP Readings from Last 1 Encounters:   09/09/19 144/72

## 2021-06-03 NOTE — TELEPHONE ENCOUNTER
RN cannot approve Refill Request    RN can NOT refill this medication med is not covered by policy/route to provider. Last office visit: 9/9/2019 Mahad Morales MD Last Physical: 9/13/2017 Last MTM visit: Visit date not found Last visit same specialty: 9/9/2019 Mahad Morales MD.  Next visit within 3 mo: Visit date not found  Next physical within 3 mo: Visit date not found      Martha Bynum, [facility] Triage/Med Refill 12/2/2019    Requested Prescriptions   Pending Prescriptions Disp Refills     ofloxacin (OCUFLOX) 0.3 % ophthalmic solution [Pharmacy Med Name: Ofloxacin Ophthalmic Solution 0.3 %] 10 mL 0     Sig: Administer 10 drops into the left ear 2 (two) times a day for 7 days.       There is no refill protocol information for this order

## 2021-06-04 VITALS — TEMPERATURE: 98.2 F | OXYGEN SATURATION: 98 % | HEART RATE: 85 BPM | RESPIRATION RATE: 24 BRPM

## 2021-06-04 VITALS
OXYGEN SATURATION: 98 % | BODY MASS INDEX: 36.18 KG/M2 | WEIGHT: 273 LBS | TEMPERATURE: 97.5 F | SYSTOLIC BLOOD PRESSURE: 148 MMHG | HEART RATE: 84 BPM | DIASTOLIC BLOOD PRESSURE: 78 MMHG | RESPIRATION RATE: 20 BRPM | HEIGHT: 73 IN

## 2021-06-04 VITALS
DIASTOLIC BLOOD PRESSURE: 68 MMHG | BODY MASS INDEX: 35.66 KG/M2 | HEART RATE: 66 BPM | WEIGHT: 270.31 LBS | SYSTOLIC BLOOD PRESSURE: 122 MMHG

## 2021-06-04 VITALS
BODY MASS INDEX: 35.93 KG/M2 | OXYGEN SATURATION: 97 % | WEIGHT: 272.3 LBS | SYSTOLIC BLOOD PRESSURE: 138 MMHG | DIASTOLIC BLOOD PRESSURE: 74 MMHG | HEART RATE: 80 BPM

## 2021-06-04 NOTE — PROGRESS NOTES
ASSESSMENT AND PLAN:  1. Type 2 diabetes mellitus with hyperosmolarity without coma, unspecified whether long term insulin use (H)  Diabetes is poorly controlled last A1c is 10.5 we need to recheck that again I am checking a BMP.  He understands that he needs to change his diet.  He is taking high dose of insulin.  He will see his primary if his symptoms do not improve I will have him see the diabetic educator  - Glycosylated Hemoglobin A1c  - Basic Metabolic Panel    2. Essential hypertension  Blood pressure is elevated because of his pain he does not wish to take new medications    3. Coronary artery disease involving native coronary artery of native heart without angina pectoris    Takes Plavix unable to exercise because of body pain, saw cardiology recently only wants to take Plavix and does not want to switch    4. Primary osteoarthritis involving multiple joints    Recent test done by . labs were negative including a negative MARCOS Lyme test and Rh factor.  Pain is worsened because of his poor diabetic control.    5. Chronic midline low back pain without sciatica    - gabapentin (NEURONTIN) 300 MG capsule; Take 1 capsule (300 mg total) by mouth 3 (three) times a day.  Dispense: 30 capsule; Refill: 0  Well controlled          Orders Placed This Encounter   Procedures     Glycosylated Hemoglobin A1c     Basic Metabolic Panel     There are no discontinued medications.    No follow-ups on file.    CHIEF COMPLAINT:  Chief Complaint   Patient presents with     Pain     whole body has pain for 5 years       HISTORY OF PRESENT ILLNESS:  Mahad is a 69 y.o. male with coronary artery disease, type 2 diabetes, hypertension, hyperlipidemia, history of asthma, bipolar disorder, chronic back pain, neuropathic pain, osteoarthritis, GERD, subacute dyskinesia due to drug, gait abnormality, enlarged prostate with urinary obstruction, and ED, who presents to the clinic today for a second opinion on his chronic pain. He has had  "chronic pain in his back, knees, wrists, and fingers for five years. There is burning pain in his back and hands.    In terms of his diabetes, the patient knows that his blood sugars are bad as they have been ranging from 216 to 300. He is taking 80 units in the morning and at night. Mahad admits to being fond of sweets. He is unable to exercise due to his joint pain.     The patient reports that his sleep has been disturbed as his upstairs neighbors make a lot of noise Management has suggested that he move if he does not like the noise.    Mahad follows with Dr. Posey in Cardiology for his coronary artery disease. He refused to switch from Plavix to another anticoagulant medication as he is concerned about the side effects of other anticoagulants.    REVIEW OF SYSTEMS:   Musculoskeletal: Back pain, neck pain, bilateral wrist and hand pain, and bilateral knee pain.  Neuro: Burning sensation in back and hands. Sleep disturbance.  All other systems are negative.    PFSH:  He is  though is unaccompanied today. He does not exercise regularly. Reviewed as below.     TOBACCO USE:  Social History     Tobacco Use   Smoking Status Never Smoker   Smokeless Tobacco Never Used       VITALS:  Vitals:    12/31/19 1425 12/31/19 1502   BP: 148/78 148/78   Pulse: 84    Resp: 20    Temp: 97.5  F (36.4  C)    TempSrc: Oral    SpO2: 98%    Weight: (!) 273 lb (123.8 kg)    Height: 6' 1\" (1.854 m)      Wt Readings from Last 3 Encounters:   12/31/19 (!) 273 lb (123.8 kg)   09/09/19 (!) 273 lb (123.8 kg)   07/16/19 (!) 265 lb (120.2 kg)     Body mass index is 36.02 kg/m .    PHYSICAL EXAM:  General: Alert, cooperative, no distress, appears stated age  Head: Normocephalic, without obvious abnormality, atraumatic, moist mucous membranes  Eyes: PERRL, conjunctiva/cornea clear, EOM's intact  Neck: Supple, no cervical lymph node enlargement   Lungs: Clear to auscultation bilaterally, respirations unlabored  Heart: Regular rate and " rhythm, S1 and S2 normal, no murmur, rub, or gallop  Musculoskeletal: On exam of bilateral knees, drawer tests are negative. Bilateral flexion to 60 degrees and bilateral extension to 30 degrees.   Neurologic:  A & O x 3.  No tremor, no focal findings. Normal gait.   Psychiatric: Normal affect, not belligerent, good eye contact, adequately groomed  Skin: No rash or suspicious lesions noted on exposed skin, non-diaphoretic    DATA REVIEWED:  Additional History from Old Records Summarized (2): Reviewed Dr. Morales' 09/09/2019 note regarding bipolar disorder. Reviewed 06/11/2019 Cardiology note regarding coronary artery disease. Reviewed 10/24/2017 ED note regarding back pain.  Decision to Obtain Records (1): none  Radiology Tests Summarized or Ordered (1): none CT of the head negative  Labs Reviewed or Ordered (1): Labs ordered.  Medicine Test Summarized or Ordered (1): none  Independent Review of EKG or X-RAY(2 each): none    Total time was 20 minutes, greater than 50% counseling and coordinating care regarding the above issues.     I, Elva Carbajal, am scribing for and in the presence of, Dr. Gastelum.    I, Dr. Gastelum, personally performed the services described in this documentation, as scribed by Elva Carbajal in my presence, and it is both accurate and complete.      MEDICATIONS:  Current Outpatient Medications   Medication Sig Dispense Refill     aspirin 81 MG EC tablet Take 81 mg by mouth daily.       atenolol (TENORMIN) 50 MG tablet TAKE ONE TABLET BY MOUTH EVERY NIGHT AT BEDTIME 90 tablet 3     atorvastatin (LIPITOR) 20 MG tablet Take 1 tablet (20 mg total) by mouth daily. 90 tablet 3     blood glucose meter (GLUCOMETER) OneTouch Ultra. Dispense glucometer brand per patient's insurance at pharmacy discretion. Pt testing 3 times per day. 1 each 0     celecoxib (CELEBREX) 200 MG capsule Take 1 capsule (200 mg total) by mouth every 12 (twelve) hours as needed for pain. 60 capsule 3     cholecalciferol, vitamin D3, 1,000  unit tablet Take 2,000 Units by mouth every other day. Takes at 1200       clopidogrel (PLAVIX) 75 mg tablet Take 1 tablet (75 mg total) by mouth daily. 90 tablet 2     generic lancets Dispense brand per patient's insurance at pharmacy discretion. TESTS 3 times per day 350 each 3     insulin needles, disposable, 31 Ndle 3 (three) times a day. For insulin injections.       insulin NPH-insulin regular (NOVOLIN 70/30 U-100 INSULIN) 100 unit/mL (70-30) injection Inject 85-90 Units under the skin 2 (two) times a day before meals. 50 mL 11     insulin syringe-needle U-100 1 mL 31 gauge x 5/16 Syrg USE AS DIRECTED ONCE DAILY 100 each 2     isosorbide mononitrate (IMDUR) 30 MG 24 hr tablet TAKE ONE TABLET BY MOUTH ONE TIME DAILY 90 tablet 1     lisinopril (PRINIVIL,ZESTRIL) 40 MG tablet Take 1 tablet (40 mg total) by mouth daily. 90 tablet 3     metFORMIN (GLUCOPHAGE) 1000 MG tablet TAKE ONE TABLET BY MOUTH 2 TIMES A DAY WITH MEALS 180 tablet 3     nitroglycerin (NITROSTAT) 0.4 MG SL tablet Place 1 tablet (0.4 mg total) under the tongue every 5 (five) minutes as needed for chest pain. Max of 3 doses in 15 minutes. 25 tablet 1     omeprazole (PRILOSEC) 20 MG capsule Take 1 capsule (20 mg total) by mouth daily before breakfast. 90 capsule 3     ONETOUCH ULTRA BLUE TEST STRIP strips TEST 3 times per day. 350 strip 3     QUEtiapine (SEROQUEL) 400 MG tablet Take 1 tablet (400 mg total) by mouth at bedtime. 30 tablet 6     tamsulosin (FLOMAX) 0.4 mg cap Take 1 capsule (0.4 mg total) by mouth daily. 90 capsule 3     acetaminophen (TYLENOL) 500 MG tablet Take 500 mg by mouth 2 (two) times a day.       gabapentin (NEURONTIN) 300 MG capsule Take 1 capsule (300 mg total) by mouth 3 (three) times a day. 30 capsule 0     LORazepam (ATIVAN) 0.5 MG tablet Take 1 tablet (0.5 mg total) by mouth 2 (two) times a day as needed for anxiety. 60 tablet 0     ofloxacin (OCUFLOX) 0.3 % ophthalmic solution Administer 10 drops into the left ear 2  (two) times a day for 7 days. 10 mL 0     QUEtiapine (SEROQUEL) 100 MG tablet Take 1-2 tablets (100-200 mg total) by mouth every morning. Continue to take 400 mg at bedtime (seperate rx) 60 tablet 6     No current facility-administered medications for this visit.        Total Data Points: 4    Please note that this clinical encounter uses voice recognition software, there may be typographical errors present

## 2021-06-04 NOTE — TELEPHONE ENCOUNTER
RN cannot approve Refill Request    RN can NOT refill this medication med is not covered by policy/route to provider. Last office visit: 9/9/2019 Mahad Morales MD Last Physical: 9/13/2017 Last MTM visit: Visit date not found Last visit same specialty: 9/9/2019 Mahad Morales MD.  Next visit within 3 mo: Visit date not found  Next physical within 3 mo: Visit date not found      Ashley Sood, [facility] Triage/Med Refill 12/19/2019    Requested Prescriptions   Pending Prescriptions Disp Refills     celecoxib (CELEBREX) 200 MG capsule [Pharmacy Med Name: Celecoxib Oral Capsule 200 MG] 60 capsule 3     Sig: Take 1 capsule (200 mg total) by mouth every 12 (twelve) hours as needed for pain.       There is no refill protocol information for this order

## 2021-06-05 VITALS
RESPIRATION RATE: 20 BRPM | BODY MASS INDEX: 34.72 KG/M2 | SYSTOLIC BLOOD PRESSURE: 136 MMHG | DIASTOLIC BLOOD PRESSURE: 62 MMHG | HEIGHT: 73 IN | WEIGHT: 262 LBS | HEART RATE: 90 BPM | TEMPERATURE: 97.5 F | OXYGEN SATURATION: 97 %

## 2021-06-05 NOTE — TELEPHONE ENCOUNTER
Question following Office Visit  When did you see your provider: 02/05/2020  What is your question: Caller states she is calling in to update provider about patients medication SEROQUEL patient is doing well but he has strong pain in joints ,Knee and back need medication for pain and also he scratched in his ingrown area he is bleeding Patient is on PLAVIX per caller bleeding is in control . Patient states she will be home requested call from provider to discuss directly .   Okay to leave a detailed message: No

## 2021-06-05 NOTE — PROGRESS NOTES
MTM Follow Up Encounter  Assessment & Plan                                                     1. Type 2 diabetes mellitus with hyperglycemia, with long-term current use of insulin (H)  Needs improvement.  Patient's A1c not at goal of less than 8%.  Pharmacist reviewed avery glucose patterns summary with patient today along with review of replacing  sensor every 2 weeks.   Patient currently using NovoLog 70/30 mix insulin 270 units a day, with blood sugars remaining consistently between 200-400.   Additionally, patient is overweight and would likely benefit from a GLP-1 agonist or SGLT2 for weight management and cardiovascular benefits.  When mention today, patient refuses and states that he is not willing to try any other medications at this time.  Unable to discuss in detail today, but would recommend further discussion regarding diet and exercise to help with weight management and blood sugar control simultaneously.    Plan  - Pharmacist provided detailed instructions on replacing sensor every 2 weeks, patient's wife assisted with this process    Future consideration: GLP-1 agonist or SGLT2 use    2. Bipolar Disorder  Needs improvement.  Recommended rescheduling visit with psychotherapist at [facility] to help with navigating next steps for current bipolar episode.    3. Pain of right hand  Needs further assessment.  Deferred to PCP for evaluation of pain in right hand and next steps.    Follow Up  Return in about 4 weeks (around 3/4/2020) for Medication Review.   Nothing scheduled today but would recommend reassessment in 1 month.   Subjective & Objective                                                       Mahad Orellana is a 70 y.o. male coming in for a follow up visit for Medication Therapy Management. He was referred to me from Mahad Morales MD.     Chief Complaint: Follow Up from MTM visit on 20    Medication Adherence/Access: Patients wife sets up medications in a pillbox and manages patients  medications. States that he doesn't forget medication doses, no concerns.    Diabetes:  Pt currently taking Novolog 70/30 insulin 90 units three times a day at 11, 6 and 11, metformin 1000 mg two times a day. Patient is using the $25 insulin from [employer] for cost purposes. patient is not currently experiencing side effects. Patient denies constipation or diarrhea. Patient states that insulin is poison and painful when injecting but states that he uses it because he has to.   SMBG: Patient presents with CGM today, with a  sensor on his arm and looking for help with replacing.  Please see media tab with downloaded [facility] CGM glucose patterns summary.  Patient is not experiencing hypoglycemia   ACEi/ARB:  Lisinopril 40 mg daily  Statin: atorvastatin 20 mg daily, denies myalgia  Aspirin: taking 81mg daily, denies bleeding or burising   Diet/Exercise: With patient's erratic thoughts, unable to discuss this today.  Lab Results   Component Value Date    HGBA1C 9.6 (H) 2019    HGBA1C 10.4 (H) 2019    HGBA1C 8.8 (H) 2019     Lab Results   Component Value Date    MICROALBUR 1.10 2019    LDLCALC  2018      Comment:      Invalid, Triglycerides >400    CREATININE 0.89 2019     Bipolar: Quetiapine 400 mg at bedtime, 100-200 mg in the morning. Patient previously had appointment scheduled with our in-house psychotherapist, Gemma Pickett. Since our last visit, this has been cancelled due to miscommunication. Though today, patient states that he would be willing to reschedule this appointment.     Osteoarthritis: Patient states that he has hand pain that has lasted for the last 6 months, states that he would like an x-ray of his right hand.  States that the pain is so bad that he is not even able to brush his teeth.     PMH: reviewed in EPIC   Allergies/ADRs: reviewed in EPIC   Alcohol: Unable to discuss  Tobacco:   Social History     Tobacco Use   Smoking Status Never Smoker   Smokeless  Tobacco Never Used     Today's Vitals: There were no vitals filed for this visit. - Patient refused all vitals today.   ----------------    The patient declined an after visit summary    I spent 30 minutes with this patient today;   All changes were made via collaborative practice agreement with Mahad Morales MD. A copy of the visit note was provided to the patient's provider.     Olinda Carter), PharmD  Medication Therapy Management Pharmacist  [facility]       Current Outpatient Medications   Medication Sig Dispense Refill     acetaminophen (TYLENOL) 500 MG tablet Take 500 mg by mouth 2 (two) times a day.       aspirin 81 MG EC tablet Take 81 mg by mouth daily.       atenolol (TENORMIN) 50 MG tablet TAKE ONE TABLET BY MOUTH EVERY NIGHT AT BEDTIME 90 tablet 3     atorvastatin (LIPITOR) 20 MG tablet Take 1 tablet (20 mg total) by mouth daily. 90 tablet 3     blood glucose meter (GLUCOMETER) OneTouch Ultra. Dispense glucometer brand per patient's insurance at pharmacy discretion. Pt testing 3 times per day. 1 each 0     celecoxib (CELEBREX) 200 MG capsule Take 1 capsule (200 mg total) by mouth every 12 (twelve) hours as needed for pain. 60 capsule 3     cholecalciferol, vitamin D3, 1,000 unit tablet Take 2,000 Units by mouth every other day. Takes at 1200       clopidogrel (PLAVIX) 75 mg tablet Take 1 tablet (75 mg total) by mouth daily. 90 tablet 2     flash glucose scanning reader (FREESTYLE SALUD 14 DAY READER) Misc Use 1 Units As Directed every 8 (eight) hours. 1 each 0     flash glucose sensor (FREESTYLE SALUD 14 DAY SENSOR) Kit Use 1 Units As Directed every 14 (fourteen) days. 6 kit 3     gabapentin (NEURONTIN) 600 MG tablet Take 1 tablet (600 mg total) by mouth 3 (three) times a day. 90 tablet 6     generic lancets Dispense brand per patient's insurance at pharmacy discretion. TESTS 3 times per day 350 each 3     insulin needles, disposable, 31 Ndle 3 (three) times a day.  For insulin injections.       insulin NPH-insulin regular (NOVOLIN 70/30 U-100 INSULIN) 100 unit/mL (70-30) injection Inject 85-90 Units under the skin 2 (two) times a day before meals. 50 mL 11     insulin syringe-needle U-100 1 mL 31 gauge x 5/16 Syrg USE AS DIRECTED ONCE DAILY 100 each 2     isosorbide mononitrate (IMDUR) 30 MG 24 hr tablet TAKE ONE TABLET BY MOUTH ONE TIME DAILY 90 tablet 1     lisinopril (PRINIVIL,ZESTRIL) 40 MG tablet Take 1 tablet (40 mg total) by mouth daily. 90 tablet 3     metFORMIN (GLUCOPHAGE) 1000 MG tablet TAKE ONE TABLET BY MOUTH 2 TIMES A DAY WITH MEALS 180 tablet 3     nitroglycerin (NITROSTAT) 0.4 MG SL tablet Place 1 tablet (0.4 mg total) under the tongue every 5 (five) minutes as needed for chest pain. Max of 3 doses in 15 minutes. 25 tablet 1     omeprazole (PRILOSEC) 20 MG capsule Take 1 capsule (20 mg total) by mouth daily before breakfast. 90 capsule 3     ONETOUCH ULTRA BLUE TEST STRIP strips TEST 3 times per day. 350 strip 3     QUEtiapine (SEROQUEL) 100 MG tablet Take 1-2 tablets (100-200 mg total) by mouth every morning. Continue to take 400 mg at bedtime (seperate rx) 60 tablet 6     QUEtiapine (SEROQUEL) 400 MG tablet Take 1 tablet (400 mg total) by mouth at bedtime. 30 tablet 11     tamsulosin (FLOMAX) 0.4 mg cap Take 1 capsule (0.4 mg total) by mouth daily. 90 capsule 3     No current facility-administered medications for this visit.

## 2021-06-05 NOTE — PROGRESS NOTES
"ASSESMENT AND PLAN:  Diagnoses and all orders for this visit:    Bipolar Disorder in severe exacerbation  Extensive counseling today with the patient and his wife.  He is completely unwilling to consider any psychiatric medications except quetiapine, which she acknowledges is helpful for him as detailed below.  He also refuses psychiatry referral.  I do not think psychotherapy will be helpful for him at this point because of how severely exacerbated his bipolar disorder is with his disorganized thinking and pressured speech and perseveration and lack of insight.  Counseled the patient and his wife on some strategies to prevent disruption of people around him so that he is less likely to get fired from additional clinical settings or places of business.  They will follow-up with me by phone in about a week with an update.  Privately with his wife, I did  her on indications to take him to the emergency room if she is feeling that he is a risk to himself or others.  -     QUEtiapine (SEROQUEL) 400 MG tablet; Take 1 tablet (400 mg total) by mouth at bedtime.  Dispense: 30 tablet; Refill: 11  -     QUEtiapine (SEROQUEL) 100 MG tablet; Take 1-3 tablets (100-300 mg total) by mouth every morning. Continue to take 400 mg at bedtime (seperate rx)  Dispense: 90 tablet; Refill: 11    Pain of right hand  -     XR Hand Right 3 or More VWS done today and reviewed by me personally is negative for fracture.  Most likely he is having some pain from some mild underlying osteoarthritis.  Icing and acetaminophen as needed.        Reviewed the risks and benefits of the treatment plan with the patient and/or caregiver and we discussed indications for routine and emergent follow-up.        SUBJECTIVE: 70-year-old male with history of bipolar disorder, recently has been very manic, has been suspicious of new medications.  He does take his quetiapine at bedtime.  He says \"my nights have been good, that is the only time that I have " "peace.\"  However, he has been reluctant to take a morning dose, his wife reports that sometimes she gets him to take a morning dose of 100 mg but the patient worries that it is going to make him groggy.  Patient has continued to have severely pressured speech, tangential thinking, and perseveration.  Is been very difficult for his wife to help him live his daily life with this.  Patient has a lot of paranoid thinking, especially around government, Yarsanism, and people of different ethnic backgrounds.  His wife reports that he will say racist things out loud when out in public, and it has been upsetting her disruptive to people around them.  His wife reports that he has been banned from [employer] and has been banned from 2 dental practices.  His wife reports that this is because he was saying racist things loudly and out loud at those settings.  During our visit today, the patient expresses severely racist ideas about multiple different ethnicities, this is consistent with his previous behavior in the clinic, please see previous notes for details.  His wife is feeling exhausted and hopeless.    The patient's main concern today is his right hand pain.  He reports that he had complained of right hand pain multiple times in the past but was never allowed to have an x-ray.  He then goes on long rants about the medical establishment being set against him.  Patient reports moderately severe pain across the palmar aspect of his hand just proximal to the fingers.  He says he injured it when slipping on the ice several months ago.  The pain does worsen with gripping and movements.    Past Medical History:   Diagnosis Date     Arthritis      Asthma      Bipolar affective disorder (H)      BPH (benign prostatic hyperplasia) 01/06/2015     CAD (coronary artery disease) 07/20/2015     Diabetes mellitus (H)      Dyslipidemia, goal LDL below 70 07/20/2015     Eczema      Essential hypertension      Tardive dyskinesia      Patient " Active Problem List   Diagnosis     Subacute dyskinesia due to drug     Dyslipidemia, goal LDL below 70     Essential hypertension     Type 2 diabetes mellitus (H)     Bipolar Disorder     Enlarged prostate with urinary obstruction     Coronary artery disease     Erectile dysfunction     Gait abnormality     Gastroesophageal reflux disease without esophagitis     Chronic back pain     Neuropathic pain     Primary osteoarthritis involving multiple joints     Current Outpatient Medications   Medication Sig Dispense Refill     acetaminophen (TYLENOL) 500 MG tablet Take 500 mg by mouth 2 (two) times a day.       aspirin 81 MG EC tablet Take 81 mg by mouth daily.       atenolol (TENORMIN) 50 MG tablet TAKE ONE TABLET BY MOUTH EVERY NIGHT AT BEDTIME 90 tablet 3     atorvastatin (LIPITOR) 20 MG tablet Take 1 tablet (20 mg total) by mouth daily. 90 tablet 3     blood glucose meter (GLUCOMETER) OneTouch Ultra. Dispense glucometer brand per patient's insurance at pharmacy discretion. Pt testing 3 times per day. 1 each 0     celecoxib (CELEBREX) 200 MG capsule Take 1 capsule (200 mg total) by mouth every 12 (twelve) hours as needed for pain. 60 capsule 3     cholecalciferol, vitamin D3, 1,000 unit tablet Take 2,000 Units by mouth every other day. Takes at 1200       clopidogrel (PLAVIX) 75 mg tablet Take 1 tablet (75 mg total) by mouth daily. 90 tablet 2     flash glucose scanning reader (FREESTYLE SALUD 14 DAY READER) Misc Use 1 Units As Directed every 8 (eight) hours. 1 each 0     flash glucose sensor (FREESTYLE SALUD 14 DAY SENSOR) Kit Use 1 Units As Directed every 14 (fourteen) days. 6 kit 3     gabapentin (NEURONTIN) 600 MG tablet Take 1 tablet (600 mg total) by mouth 3 (three) times a day. 90 tablet 6     generic lancets Dispense brand per patient's insurance at pharmacy discretion. TESTS 3 times per day 350 each 3     insulin needles, disposable, 31 Ndle 3 (three) times a day. For insulin injections.       insulin  NPH-insulin regular (NOVOLIN 70/30 U-100 INSULIN) 100 unit/mL (70-30) injection Inject 85-90 Units under the skin 2 (two) times a day before meals. 50 mL 11     insulin syringe-needle U-100 1 mL 31 gauge x 5/16 Syrg USE AS DIRECTED ONCE DAILY 100 each 2     isosorbide mononitrate (IMDUR) 30 MG 24 hr tablet TAKE ONE TABLET BY MOUTH ONE TIME DAILY 90 tablet 1     lisinopril (PRINIVIL,ZESTRIL) 40 MG tablet Take 1 tablet (40 mg total) by mouth daily. 90 tablet 3     metFORMIN (GLUCOPHAGE) 1000 MG tablet TAKE ONE TABLET BY MOUTH 2 TIMES A DAY WITH MEALS 180 tablet 3     nitroglycerin (NITROSTAT) 0.4 MG SL tablet Place 1 tablet (0.4 mg total) under the tongue every 5 (five) minutes as needed for chest pain. Max of 3 doses in 15 minutes. 25 tablet 1     omeprazole (PRILOSEC) 20 MG capsule Take 1 capsule (20 mg total) by mouth daily before breakfast. 90 capsule 3     ONETOUCH ULTRA BLUE TEST STRIP strips TEST 3 times per day. 350 strip 3     QUEtiapine (SEROQUEL) 100 MG tablet Take 1-3 tablets (100-300 mg total) by mouth every morning. Continue to take 400 mg at bedtime (seperate rx) 90 tablet 11     QUEtiapine (SEROQUEL) 400 MG tablet Take 1 tablet (400 mg total) by mouth at bedtime. 30 tablet 11     tamsulosin (FLOMAX) 0.4 mg cap Take 1 capsule (0.4 mg total) by mouth daily. 90 capsule 3     No current facility-administered medications for this visit.      Social History     Tobacco Use   Smoking Status Never Smoker   Smokeless Tobacco Never Used       OBJECTICE: Pulse 85   Temp 98.2  F (36.8  C) (Oral)   Resp 24   SpO2 98%      No results found for this or any previous visit (from the past 24 hour(s)).     GEN-alert   Musculoskeletal- no tenderness to palpation over the bony anatomy of his hand.  No deformity.  No effusion.   Neurologic-strength is 5 out of 5 and symmetric in the upper extremities.   Psychiatric-appearance is well-groomed, speech is extremely pressured, affect is manic, he has severe flight of ideas  and perseveration, he has paranoid and delusional thinking, judgment and insight are severely limited.    Mahad Morales

## 2021-06-05 NOTE — PROGRESS NOTES
MTM Initial Encounter  Assessment & Plan                                                     1. Bipolar Disorder  Needs significant improvement. After recommending a visit with psychiatry, patient refuses. Patients currently taking quetiapine. Based on discussion with patient, wife, and provider, patient in need of mood stabilizer such as lamotrigine or lithium (which doesn't appear to have been tried). Patient has tried and failed carbamazepine and divalproex in the past. Most importantly, recommended that patient consider a psychiatry consult.     2. Type 2 diabetes mellitus with hyperglycemia, with long-term current use of insulin (H)  Needs improvement. Patients diabetes is not controlled and he is currently taking novolog 70/30 mix insulin. Pending insurance would recommend use of GLP-1 and SGLT2 for weight management, and cardiovascular benefits. Could consider these options in the future once bipolar disorder is more controlled. For now, patient states that he doesn't like poking his fingers and would like to see if a CGM is covered, will send new rx today.   Plan  - flash glucose scanning reader (FREESTYLE SALUD 14 DAY READER) Misc; Use 1 Units As Directed every 8 (eight) hours.  Dispense: 1 each; Refill: 0  - flash glucose sensor (FREESTYLE SALUD 14 DAY SENSOR) Kit; Use 1 Units As Directed every 14 (fourteen) days.  Dispense: 6 kit; Refill: 3    3. Enlarged prostate with urinary obstruction  Needs improvement. Patient is taking tamsulosin and continues to have difficulty with his urinary stream. Could consider alternatives such as finasteride or terazosin, will route recommendation to provider for future visit.      4. Primary osteoarthritis involving multiple joints/chronic pain  Needs improvement. Patient states that his primary concern is his pain and describes this as the reason for his behaviors. Patient describes the pain as arthritic in nature today, therefore recommended patient use scheduled  "acetaminophen for his pain for at least 2 weeks to determine if this can be beneficial.   Plan  - Patient to take acetaminophen 1,000 mg 3-4 times daily for at least 2 weeks for pain    5. Coronary artery disease involving native coronary artery of native heart without angina pectoris  Stable.  Patient appropriately taking statin, ACE inhibitor, daily aspirin and clopidogrel.  Patient has nitroglycerin tablets to use as needed.  Recommended patient follow-up with cardiologist as warranted.    6. Gastroesophageal reflux disease without esophagitis  Patient currently taking scheduled PPI for many years.  Patient aware that this medication has long-term side effects, unable to discuss further about tapering today.  Would recommend discussion at follow-up.    Follow Up  Return in about 2 weeks (around 1/31/2020) for Recheck.    Subjective & Objective                                                     Mahad Orellana is a 69 y.o. male coming in for an initial visit for Medication Therapy Management. He was referred to me from Mahad Morales MD. Last MTM visit was October 2018. Patient presents with his wife, Rosie today.     Chief Complaint: Pain, DM, initial visit for bipolar disorder and pain per PCP    Medication Adherence/Access: Patients wife sets up medications in a pillbox and manages patients medications. States that he doesn't forget medication doses, no concerns.    Bipolar: Quetiapine 400 mg at bedtime, 100-200 mg in the morning. When he tried taking the gabapentin 600 mg three times a day - states that his legs felt like rubber. Wife states that Mahad fell because of the gabapentin and he couldn't get up. Patient is requesting opioids, thinks this will be the only thing that can help with his pain. Patient refuses to try taking the gabapentin again because of the side effects. Patient absolutely refuses seeing a psychiatrist. Patient states that psychiatrists \"treat patients like guinea pigs and give them one " "medication after another\". He does not want to change any medications right now. Patients vulgar word choices and endless talking are disturbing to wife, she is wondering if there are any other medications that can help Mahad. Patients wife questions if there is anything that he can take to help with his anger.     Diabetes:  Pt currently taking Novolog 70/30 insulin 90 units three times a day at 11, 6 and 11, metformin 1000 mg two times a day. Patient is using the $25 insulin from [employer] for cost purposes. patient is not currently experiencing side effects. Patient denies constipation or diarrhea. Patient states that insulin is poison and painful when injecting but states that he uses it because he has to.   SMB-2 times daily    Ranges (based on glucometer readings) : fasting daily 200-300 - when discussing the CGM today, patient states that he wants to use a CGM and no longer wants to poke his fingers, states this is painful.   Patient is not experiencing hypoglycemia   Eye exam: States that he won't go there again.   Foot exam: up to date   ACEi/ARB:  Lisinopril 40 mg daily  Statin: atorvastatin 20 mg daily, denies myalgia  Aspirin: taking 81mg daily, denies bleeding or burising   Diet/Exercise: Eating 2 meals daily with a snack at night.   Lab Results   Component Value Date    HGBA1C 9.6 (H) 2019    HGBA1C 10.4 (H) 2019    HGBA1C 8.8 (H) 2019     Lab Results   Component Value Date    MICROALBUR 1.10 2019    LDLCALC  2018      Comment:      Invalid, Triglycerides >400    CREATININE 0.89 2019     Enlarged prostate: Tamsulosin 0.4 mg daily, been on this for years. Has had trouble with his urine \"sraying all over\" and urinary urgency, feels bad when his wife has to \"clean up after him\". Appears as though symptoms have an impact on his quality of life. Patient states that there \"has to be a better medication\" to help this.     Osteoarthritis: Patient states that he does not " use the APAP, previously used the acetaminophen but it didn't work.  Taking celecoxib 200 mg at bedtime as needed for pain, originally he was happy with how this helped his pain and now states that it only works for a short time. Patient states that he has all over body aches, primarily in his back, knees, etc.      CAD: atrovastatin 20 mg daily, lisinopril 40 mg daily, aspirin 81 mg daily, clopidogrel 75 mg daily, isosorbide mononitrate 30 mg daily,  nitrostat 1-2 times monthly (takes one pill and this relieves his chest pain). Patient aware that nitroglycerin needs to be replaced every 6 months if opened.   BP Readings from Last 3 Encounters:   01/17/20 138/74   12/31/19 148/78   09/09/19 144/72     GERD: omeprazole 20 mg daily, no problems, this is working well for him, states he has been taking this for 20-30 years. He is aware that this medication has warnings of adverse effects with chronic use but still wants to continue it.    PMH: reviewed in EPIC   Allergies/ADRs: reviewed in EPIC   Alcohol: Unable to discuss   Tobacco:   Social History     Tobacco Use   Smoking Status Never Smoker   Smokeless Tobacco Never Used     Today's Vitals:   Vitals:    01/17/20 1421   BP: 138/74   Pulse: 80   SpO2: 97%   Weight: (!) 272 lb 4.8 oz (123.5 kg)     ----------------    The patient was given a summary of these recommendations as an after visit summary    I spent 60 minutes with this patient today.   All changes were made via collaborative practice agreement with Mahad Morales MD. A copy of the visit note was provided to the patient's provider.     Olinda Carter), PharmD  Medication Therapy Management Pharmacist  [facility]       Current Outpatient Medications   Medication Sig Dispense Refill     acetaminophen (TYLENOL) 500 MG tablet Take 500 mg by mouth 2 (two) times a day.       aspirin 81 MG EC tablet Take 81 mg by mouth daily.       atenolol (TENORMIN) 50 MG tablet TAKE ONE TABLET  BY MOUTH EVERY NIGHT AT BEDTIME 90 tablet 3     atorvastatin (LIPITOR) 20 MG tablet Take 1 tablet (20 mg total) by mouth daily. 90 tablet 3     blood glucose meter (GLUCOMETER) OneTouch Ultra. Dispense glucometer brand per patient's insurance at pharmacy discretion. Pt testing 3 times per day. 1 each 0     celecoxib (CELEBREX) 200 MG capsule Take 1 capsule (200 mg total) by mouth every 12 (twelve) hours as needed for pain. 60 capsule 3     cholecalciferol, vitamin D3, 1,000 unit tablet Take 2,000 Units by mouth every other day. Takes at 1200       clopidogrel (PLAVIX) 75 mg tablet Take 1 tablet (75 mg total) by mouth daily. 90 tablet 2     flash glucose scanning reader (FREESTYLE SALUD 14 DAY READER) Misc Use 1 Units As Directed every 8 (eight) hours. 1 each 0     flash glucose sensor (FREESTYLE SALUD 14 DAY SENSOR) Kit Use 1 Units As Directed every 14 (fourteen) days. 6 kit 3     gabapentin (NEURONTIN) 600 MG tablet Take 1 tablet (600 mg total) by mouth 3 (three) times a day. 90 tablet 6     generic lancets Dispense brand per patient's insurance at pharmacy discretion. TESTS 3 times per day 350 each 3     insulin needles, disposable, 31 Ndle 3 (three) times a day. For insulin injections.       insulin NPH-insulin regular (NOVOLIN 70/30 U-100 INSULIN) 100 unit/mL (70-30) injection Inject 85-90 Units under the skin 2 (two) times a day before meals. 50 mL 11     insulin syringe-needle U-100 1 mL 31 gauge x 5/16 Syrg USE AS DIRECTED ONCE DAILY 100 each 2     isosorbide mononitrate (IMDUR) 30 MG 24 hr tablet TAKE ONE TABLET BY MOUTH ONE TIME DAILY 90 tablet 1     lisinopril (PRINIVIL,ZESTRIL) 40 MG tablet Take 1 tablet (40 mg total) by mouth daily. 90 tablet 3     metFORMIN (GLUCOPHAGE) 1000 MG tablet TAKE ONE TABLET BY MOUTH 2 TIMES A DAY WITH MEALS 180 tablet 3     nitroglycerin (NITROSTAT) 0.4 MG SL tablet Place 1 tablet (0.4 mg total) under the tongue every 5 (five) minutes as needed for chest pain. Max of 3 doses  in 15 minutes. 25 tablet 1     omeprazole (PRILOSEC) 20 MG capsule Take 1 capsule (20 mg total) by mouth daily before breakfast. 90 capsule 3     ONETOUCH ULTRA BLUE TEST STRIP strips TEST 3 times per day. 350 strip 3     QUEtiapine (SEROQUEL) 100 MG tablet Take 1-2 tablets (100-200 mg total) by mouth every morning. Continue to take 400 mg at bedtime (seperate rx) 60 tablet 6     QUEtiapine (SEROQUEL) 400 MG tablet Take 1 tablet (400 mg total) by mouth at bedtime. 30 tablet 11     tamsulosin (FLOMAX) 0.4 mg cap Take 1 capsule (0.4 mg total) by mouth daily. 90 capsule 3     No current facility-administered medications for this visit.

## 2021-06-05 NOTE — TELEPHONE ENCOUNTER
Called and discussed with the patient's wife Rosie.  Patient has not seen any pain benefit yet and she has not noticed any benefit in the manic symptoms from the new medication.  He has not noticed any side effects or problems.  We decided to increase to 600 mg 3 times a day.  Patient has a follow-up next week with Olinda for MTM.  Barry, this patient's primary problem is his out-of-control manic symptoms of his underlying chronic mental illness bipolar.  It has been severely exacerbated for many months and patient has been reporting side effects from the mood stabilizers that I have tried-see previous clinic notes for details.  He is very paranoid about psychiatric medications.  He refuses to see a psychiatrist.  He is difficult to work with because of his pressured speech and racing thoughts and paranoid/tangential thinking.  The only medication that he will take for his mental health is his quetiapine which has resulted in good symptom control at night which has been a huge relief for his wife.  He is seeing you next week, anything you can do to maximize his therapy for his manic symptoms would be great.  I think it is best to talk to him about the gabapentin as a medication for pain although I am hoping that it has a side benefit of acting as a mood stabilizer.  Thank you.

## 2021-06-05 NOTE — TELEPHONE ENCOUNTER
HISTORY OF PRESENT ILLNESS:  Mahad is a 69 y.o. male with coronary artery disease, type 2 diabetes, hypertension, hyperlipidemia, history of asthma, bipolar disorder, chronic back pain, neuropathic pain, osteoarthritis, GERD, subacute dyskinesia due to drug, gait abnormality, enlarged prostate with urinary obstruction, and ED, who presents to the clinic today for a second opinion on his chronic pain. He has had chronic pain in his back, knees, wrists, and fingers for five years. There is burning pain in his back and hands.    5. Chronic midline low back pain without sciatica     - gabapentin (NEURONTIN) 300 MG capsule; Take 1 capsule (300 mg total) by mouth 3 (three) times a day.  Dispense: 30 capsule; Refill: 0  Well controlled

## 2021-06-05 NOTE — TELEPHONE ENCOUNTER
Medication Question or Clarification  Who is calling: Patient Wife   What medication are you calling about (include dose and sig)?:   gabapentin (NEURONTIN) 300 MG capsule 30 capsule 0 12/31/2019     Sig - Route: Take 1 capsule (300 mg total) by mouth 3 (three) times a day. - Oral    Who prescribed the medication?: Jeffery Mathis   What is your question/concern?: Caller states patient is on gabapentin the medication is not helping with his pain and he is getting more anger . Caller requested a call from provider to discuss directly . Please advise.   Requested [facility]acy: [facility]  Okay to leave a detailed message?: No

## 2021-06-05 NOTE — PROGRESS NOTES
MTM Consult Encounter    Mahad Orellana is a 69 y.o. male referred for a clinical pharmacist consult from Dr. Morales for CGM teaching.    Discussion: Patient presents with Freestyle Thomas 14 day with supplies for 3 months.  Pharmacist provides medication education today on thomas meter and helps patient place first thomas sensor on the back of his right arm.  Recommended checking blood sugars at least 3 times daily or more.  Patient's wife is with him today and also receives instruction on appropriate use.  Recommended monitoring blood sugars with grafts and bringing blood sugar meter to follow-up visit.  Additionally, patient's wife is inquiring today about psychotherapy and whether or not this is an option to see a psychotherapist at [facility]. Recommended that patient see a psychiatrist first, patient refused. Patient agreeable for psychotherapy referral, placed today and sent to PCP for agreement.     Plan:  1. Pharmacist provided education today for Freestyle Thomas 14 day CGM meter  2. Patient to use cgm three times a day for BG monitoring  3. Referral for psychotherapy at [facility] routed to PCP for approval    Follow up: 4 weeks with MTM    Olinda Zavaleta (Meagan), PharmD  Medication Therapy Management Pharmacist  [facility]    Current Outpatient Medications   Medication Sig Dispense Refill     acetaminophen (TYLENOL) 500 MG tablet Take 500 mg by mouth 2 (two) times a day.       aspirin 81 MG EC tablet Take 81 mg by mouth daily.       atenolol (TENORMIN) 50 MG tablet TAKE ONE TABLET BY MOUTH EVERY NIGHT AT BEDTIME 90 tablet 3     atorvastatin (LIPITOR) 20 MG tablet Take 1 tablet (20 mg total) by mouth daily. 90 tablet 3     blood glucose meter (GLUCOMETER) OneTouch Ultra. Dispense glucometer brand per patient's insurance at pharmacy discretion. Pt testing 3 times per day. 1 each 0     celecoxib (CELEBREX) 200 MG capsule Take 1 capsule (200 mg total) by mouth every 12 (twelve) hours as  needed for pain. 60 capsule 3     cholecalciferol, vitamin D3, 1,000 unit tablet Take 2,000 Units by mouth every other day. Takes at 1200       clopidogrel (PLAVIX) 75 mg tablet Take 1 tablet (75 mg total) by mouth daily. 90 tablet 2     flash glucose scanning reader (FREESTYLE SALUD 14 DAY READER) Misc Use 1 Units As Directed every 8 (eight) hours. 1 each 0     flash glucose sensor (FREESTYLE SALUD 14 DAY SENSOR) Kit Use 1 Units As Directed every 14 (fourteen) days. 6 kit 3     gabapentin (NEURONTIN) 600 MG tablet Take 1 tablet (600 mg total) by mouth 3 (three) times a day. 90 tablet 6     generic lancets Dispense brand per patient's insurance at pharmacy discretion. TESTS 3 times per day 350 each 3     insulin needles, disposable, 31 Ndle 3 (three) times a day. For insulin injections.       insulin NPH-insulin regular (NOVOLIN 70/30 U-100 INSULIN) 100 unit/mL (70-30) injection Inject 85-90 Units under the skin 2 (two) times a day before meals. 50 mL 11     insulin syringe-needle U-100 1 mL 31 gauge x 5/16 Syrg USE AS DIRECTED ONCE DAILY 100 each 2     isosorbide mononitrate (IMDUR) 30 MG 24 hr tablet TAKE ONE TABLET BY MOUTH ONE TIME DAILY 90 tablet 1     lisinopril (PRINIVIL,ZESTRIL) 40 MG tablet Take 1 tablet (40 mg total) by mouth daily. 90 tablet 3     metFORMIN (GLUCOPHAGE) 1000 MG tablet TAKE ONE TABLET BY MOUTH 2 TIMES A DAY WITH MEALS 180 tablet 3     nitroglycerin (NITROSTAT) 0.4 MG SL tablet Place 1 tablet (0.4 mg total) under the tongue every 5 (five) minutes as needed for chest pain. Max of 3 doses in 15 minutes. 25 tablet 1     omeprazole (PRILOSEC) 20 MG capsule Take 1 capsule (20 mg total) by mouth daily before breakfast. 90 capsule 3     ONETOUCH ULTRA BLUE TEST STRIP strips TEST 3 times per day. 350 strip 3     QUEtiapine (SEROQUEL) 100 MG tablet Take 1-2 tablets (100-200 mg total) by mouth every morning. Continue to take 400 mg at bedtime (seperate rx) 60 tablet 6     QUEtiapine (SEROQUEL) 400 MG  tablet Take 1 tablet (400 mg total) by mouth at bedtime. 30 tablet 11     tamsulosin (FLOMAX) 0.4 mg cap Take 1 capsule (0.4 mg total) by mouth daily. 90 capsule 3     No current facility-administered medications for this visit.

## 2021-06-05 NOTE — TELEPHONE ENCOUNTER
RN cannot approve Refill Request    RN can NOT refill this medication med is not covered by policy/route to provider. Last office visit: 9/9/2019 Mahad Morales MD Last Physical: 9/13/2017 Last MTM visit: Visit date not found Last visit same specialty: 12/31/2019 Jeffery Gastelum MD.  Next visit within 3 mo: Visit date not found  Next physical within 3 mo: Visit date not found      Ashley Sood, [facility] Triage/Med Refill 1/16/2020    Requested Prescriptions   Pending Prescriptions Disp Refills     QUEtiapine (SEROQUEL) 400 MG tablet [Pharmacy Med Name: QUEtiapine Fumarate Oral Tablet 400 MG] 30 tablet 5     Sig: Take 1 tablet (400 mg total) by mouth at bedtime.       There is no refill protocol information for this order

## 2021-06-06 NOTE — TELEPHONE ENCOUNTER
Called and discussed with the patient and his wife.  Patient continues to be severely manic.  Please see previous notes for details.  He is also complaining a lot of pain in his body.  Also itchy red area that has bled in his groin.  He has a lot of questions about the Plavix, reviewed the cardiology consultation in the past that had said it would be reasonable to discontinue the Plavix because he is well beyond the usual post stenting recommendation but it would also be reasonable for him to continue it given his risk factors.  At that time the patient decided to continue it.  Now because he is having some active, although superficial, bleeding recently we decided to stop the Plavix for 5 days.  Will use clotrimazole over-the-counter twice daily on the groin rash.    Patient and wife are agreeable with trying initiation of low-dose lamotrigine 25 mg once daily.  I will feel that this will help with his severely out-of-control bipolar disorder, continue quetiapine at bedtime.  This may also help with his pain.  We will titrate up the dose if he tolerates the low-dose well.  They will call back next week with an update on both the skin problem and the new medications.  Reviewed risks and benefits of medications.

## 2021-06-06 NOTE — TELEPHONE ENCOUNTER
FYI - Status Update  Who is Calling: Patient wife Who does not have consent to communicate on file.  Update: Pt's wife wanted to update Provider that the prescribed pain medication has seemed to be working well.  Pt's wife stated husbands pain seems to be about 4 or 5, and she was wondering if another dose in the Am can be added to have the pain even lower.  Please send to the [facility] in [place] if an additional dose is added.  Please advise.  Okay to leave a detailed message?:  No

## 2021-06-06 NOTE — TELEPHONE ENCOUNTER
Pt calling, yelling, very upset that he has not gotten a call back yet from PCP, requested another message to PCP to return call. Pt hung up after a rambling tirade about Plavix / Arnold Alva & cardiac stints.  Unable to follow what pt was trying to say due to tangential nature of conversation. I did reassure pt I would send a note to PCP  Pt terminated call    Bella Lam RN  [place] Nurse Advisor

## 2021-06-06 NOTE — TELEPHONE ENCOUNTER
Refill Approved    Rx renewed per Medication Renewal Policy. Medication was last renewed on 3/31/2019.  Last OV 2/5/2020.    Pina Keene, [facility] Connection Triage/Med Refill 2/24/2020     Requested Prescriptions   Pending Prescriptions Disp Refills     omeprazole (PRILOSEC) 20 MG capsule [Pharmacy Med Name: Omeprazole Oral Capsule Delayed Release 20 MG] 90 capsule 2     Sig: Take 1 capsule (20 mg total) by mouth daily before breakfast.       GI Medications Refill Protocol Passed - 2/21/2020  7:00 AM        Passed - PCP or prescribing provider visit in last 12 or next 3 months.     Last office visit with prescriber/PCP: 2/5/2020 Mahad Morales MD OR same dept: Visit date not found OR same specialty: Visit date not found  Last physical: 9/13/2017 Last MTM visit: 3/28/2018 Jose Manuel Pack, PharmD   Next visit within 3 mo: Visit date not found  Next physical within 3 mo: Visit date not found  Prescriber OR PCP: Mahad Morales MD  Last diagnosis associated with med order: 1. Gastroesophageal reflux disease, esophagitis presence not specified  - omeprazole (PRILOSEC) 20 MG capsule [Pharmacy Med Name: Omeprazole Oral Capsule Delayed Release 20 MG]; Take 1 capsule (20 mg total) by mouth daily before breakfast.  Dispense: 90 capsule; Refill: 2    2. Essential hypertension  - atenoloL (TENORMIN) 50 MG tablet [Pharmacy Med Name: Atenolol Oral Tablet 50 MG]; TAKE ONE TABLET BY MOUTH EVERY NIGHT AT BEDTIME  Dispense: 90 tablet; Refill: 2    3. Enlarged prostate with urinary obstruction  - tamsulosin (FLOMAX) 0.4 mg cap [Pharmacy Med Name: Tamsulosin HCl Oral Capsule 0.4 MG]; Take 1 capsule (0.4 mg total) by mouth daily.  Dispense: 90 capsule; Refill: 2    If protocol passes may refill for 12 months if within 3 months of last provider visit (or a total of 15 months).             atenoloL (TENORMIN) 50 MG tablet [Pharmacy Med Name: Atenolol Oral Tablet 50 MG] 90 tablet 2     Sig: TAKE ONE TABLET BY MOUTH EVERY NIGHT AT  BEDTIME       Beta-Blockers Refill Protocol Passed - 2/21/2020  7:00 AM        Passed - PCP or prescribing provider visit in past 12 months or next 3 months     Last office visit with prescriber/PCP: 2/5/2020 Mahad Morales MD OR same dept: Visit date not found OR same specialty: Visit date not found  Last physical: 9/13/2017 Last MTM visit: 3/28/2018 Jose Manuel Pack PharmD   Next visit within 3 mo: Visit date not found  Next physical within 3 mo: Visit date not found  Prescriber OR PCP: Mahad Morales MD  Last diagnosis associated with med order: 1. Gastroesophageal reflux disease, esophagitis presence not specified  - omeprazole (PRILOSEC) 20 MG capsule [Pharmacy Med Name: Omeprazole Oral Capsule Delayed Release 20 MG]; Take 1 capsule (20 mg total) by mouth daily before breakfast.  Dispense: 90 capsule; Refill: 2    2. Essential hypertension  - atenoloL (TENORMIN) 50 MG tablet [Pharmacy Med Name: Atenolol Oral Tablet 50 MG]; TAKE ONE TABLET BY MOUTH EVERY NIGHT AT BEDTIME  Dispense: 90 tablet; Refill: 2    3. Enlarged prostate with urinary obstruction  - tamsulosin (FLOMAX) 0.4 mg cap [Pharmacy Med Name: Tamsulosin HCl Oral Capsule 0.4 MG]; Take 1 capsule (0.4 mg total) by mouth daily.  Dispense: 90 capsule; Refill: 2    If protocol passes may refill for 12 months if within 3 months of last provider visit (or a total of 15 months).             Passed - Blood pressure filed in past 12 months     BP Readings from Last 1 Encounters:   01/17/20 138/74             tamsulosin (FLOMAX) 0.4 mg cap [Pharmacy Med Name: Tamsulosin HCl Oral Capsule 0.4 MG] 90 capsule 2     Sig: Take 1 capsule (0.4 mg total) by mouth daily.       Alfuzosin/Tamsulosin/Silodosin Refill Protocol  Passed - 2/21/2020  7:00 AM        Passed - PCP or prescribing provider visit in past 12 months       Last office visit with prescriber/PCP: 2/5/2020 Mahad Morales MD OR same dept: Visit date not found OR same specialty: Visit date not found  Last  physical: 9/13/2017 Last MTM visit: 3/28/2018 Jose Manuel Pack, Rosy   Next visit within 3 mo: Visit date not found  Next physical within 3 mo: Visit date not found  Prescriber OR PCP: Mahad Morales MD  Last diagnosis associated with med order: 1. Gastroesophageal reflux disease, esophagitis presence not specified  - omeprazole (PRILOSEC) 20 MG capsule [Pharmacy Med Name: Omeprazole Oral Capsule Delayed Release 20 MG]; Take 1 capsule (20 mg total) by mouth daily before breakfast.  Dispense: 90 capsule; Refill: 2    2. Essential hypertension  - atenoloL (TENORMIN) 50 MG tablet [Pharmacy Med Name: Atenolol Oral Tablet 50 MG]; TAKE ONE TABLET BY MOUTH EVERY NIGHT AT BEDTIME  Dispense: 90 tablet; Refill: 2    3. Enlarged prostate with urinary obstruction  - tamsulosin (FLOMAX) 0.4 mg cap [Pharmacy Med Name: Tamsulosin HCl Oral Capsule 0.4 MG]; Take 1 capsule (0.4 mg total) by mouth daily.  Dispense: 90 capsule; Refill: 2    If protocol passes may refill for 12 months if within 3 months of last provider visit (or a total of 15 months).

## 2021-06-06 NOTE — TELEPHONE ENCOUNTER
Refill Approved    Rx renewed per Medication Renewal Policy. Medication was last renewed on 3/12/19.    Martha Bynum, [facility] Connection Triage/Med Refill 2/28/2020     Requested Prescriptions   Pending Prescriptions Disp Refills     lisinopriL (PRINIVIL,ZESTRIL) 40 MG tablet [Pharmacy Med Name: Lisinopril Oral Tablet 40 MG] 90 tablet 2     Sig: Take 1 tablet (40 mg total) by mouth daily.       Ace Inhibitors Refill Protocol Passed - 2/28/2020  7:01 AM        Passed - PCP or prescribing provider visit in past 12 months       Last office visit with prescriber/PCP: 2/5/2020 Mahad Morales MD OR same dept: 2/5/2020 Mahad Morales MD OR same specialty: 2/5/2020 Mahad Morales MD  Last physical: 9/13/2017 Last MTM visit: Visit date not found   Next visit within 3 mo: Visit date not found  Next physical within 3 mo: Visit date not found  Prescriber OR PCP: Mahad Morales MD  Last diagnosis associated with med order: 1. Essential hypertension  - lisinopriL (PRINIVIL,ZESTRIL) 40 MG tablet [Pharmacy Med Name: Lisinopril Oral Tablet 40 MG]; Take 1 tablet (40 mg total) by mouth daily.  Dispense: 90 tablet; Refill: 2    If protocol passes may refill for 12 months if within 3 months of last provider visit (or a total of 15 months).             Passed - Serum Potassium in past 12 months     Lab Results   Component Value Date    Potassium 4.2 12/31/2019             Passed - Blood pressure filed in past 12 months     BP Readings from Last 1 Encounters:   01/17/20 138/74             Passed - Serum Creatinine in past 12 months     Creatinine   Date Value Ref Range Status   12/31/2019 0.89 0.70 - 1.30 mg/dL Final

## 2021-06-06 NOTE — TELEPHONE ENCOUNTER
Called and clarified with the patient's wife, he continues to take his Seroquel for bipolar disorder and we have added lamotrigine and the patient is tolerated 25 mg dose well.  He has noticed some improvement in his pain and his wife is noticed some improvement in his bipolar symptoms.  We are going to increase to 50 mg/day for the next 2 weeks and then she will call me with an update again in 2 to 3 weeks and we can decide whether to increase again to 100 mg/day at that time.

## 2021-06-06 NOTE — TELEPHONE ENCOUNTER
New Appointment Needed  What is the reason for the visit:  groin area is red and burns where stents are  Same Date/Next Day Appt Request  What is the reason for your visit?: groin area is red and burns where stents are    Provider Preference: PCP only  How soon do you need to be seen?: today  Waitlist offered?: No  Okay to leave a detailed message:  Yes

## 2021-06-06 NOTE — TELEPHONE ENCOUNTER
RN cannot approve Refill Request    RN can NOT refill this medication med is not covered by policy/route to provider. Last office visit: 2/5/2020 Mahad Morales MD Last Physical: 9/13/2017 Last MTM visit: Visit date not found Last visit same specialty: 2/5/2020 Mahad Morales MD.  Next visit within 3 mo: Visit date not found  Next physical within 3 mo: Visit date not found      Ashley Sood, [facility] Triage/Med Refill 2/27/2020    Requested Prescriptions   Pending Prescriptions Disp Refills     nitroglycerin (NITROSTAT) 0.4 MG SL tablet [Pharmacy Med Name: Nitroglycerin Sublingual Tablet Sublingual 0.4 MG] 25 tablet 0     Sig: PLACE ONE TABLET UNDER THE TONGUE EVERY 5 MINUTES AS NEEDED FOR CHEST PAIN.  MAXIMUM DOSE OF 3 TABLETS IN 15 MINUTES.       There is no refill protocol information for this order

## 2021-06-06 NOTE — TELEPHONE ENCOUNTER
Refill Approved    Rx renewed per Medication Renewal Policy. Medication was last renewed on 3/4/2019.    Mark Robles, [facility] Triage/Med Refill 2/16/2020     Requested Prescriptions   Pending Prescriptions Disp Refills     atorvastatin (LIPITOR) 20 MG tablet [Pharmacy Med Name: Atorvastatin Calcium Oral Tablet 20 MG] 90 tablet 2     Sig: Take 1 tablet by mouth daily.       Statins Refill Protocol (Hmg CoA Reductase Inhibitors) Passed - 2/12/2020  7:01 AM        Passed - PCP or prescribing provider visit in past 12 months      Last office visit with prescriber/PCP: 2/5/2020 Mahad Morales MD OR same dept: Visit date not found OR same specialty: Visit date not found  Last physical: 9/13/2017 Last MTM visit: 3/28/2018 Jose Manuel Pack, PharmD   Next visit within 3 mo: Visit date not found  Next physical within 3 mo: Visit date not found  Prescriber OR PCP: Mahad Morales MD  Last diagnosis associated with med order: 1. Dyslipidemia  - atorvastatin (LIPITOR) 20 MG tablet [Pharmacy Med Name: Atorvastatin Calcium Oral Tablet 20 MG]; Take 1 tablet by mouth daily.  Dispense: 90 tablet; Refill: 2    If protocol passes may refill for 12 months if within 3 months of last provider visit (or a total of 15 months).

## 2021-06-06 NOTE — TELEPHONE ENCOUNTER
Who is calling:  Patient's wifeRosie   Reason for Call:  Wife was calling to check on status of message and if patient could get seen today.   Writer did let wife know that message was sent to provider's team and they will be reaching out.   Date of last appointment with primary care:   Okay to leave a detailed message: Yes - wife also provided alternative contact number as they will be leaving for a bit this afternoon; her mobile is [phone]

## 2021-06-06 NOTE — TELEPHONE ENCOUNTER
Discussed with the patient's wife Rosie.  He is doing better with his manic symptoms with the increased dose of Seroquel detailed in the last note.  He is having pain in his knees and back and is wondering about what he should be taking for his arthritis.  He is not currently taking his prescribed Celebrex.  He will start taking that every 12 hours if needed.  It sounds like he might have a yeast dermatitis in the groin that he has been scratching it a lot.  Will use an over-the-counter Chlortrimazole and if the area has not resolved completely over the next 2 weeks will need to be evaluated in the clinic.  Patient and his wife in agreement with the plan.

## 2021-06-07 NOTE — TELEPHONE ENCOUNTER
RN cannot approve Refill Request    RN can NOT refill this medication med is not covered by policy/route to provider. Last office visit: 2/5/2020 Mahad Morales MD Last Physical: 9/13/2017 Last MTM visit: Visit date not found Last visit same specialty: 2/5/2020 Mahad Morales MD.  Next visit within 3 mo: Visit date not found  Next physical within 3 mo: Visit date not found      Kristy York, [facility] Triage/Med Refill 4/5/2020    Requested Prescriptions   Pending Prescriptions Disp Refills     celecoxib (CELEBREX) 200 MG capsule [Pharmacy Med Name: Celecoxib Oral Capsule 200 MG] 60 capsule 2     Sig: Take 1 capsule (200 mg total) by mouth every 12 (twelve) hours as needed for pain.       There is no refill protocol information for this order

## 2021-06-08 NOTE — TELEPHONE ENCOUNTER
"Wife says the the FreeStyle Thomas reads \"LOW\", and when they compare to the other reading, it's not low.  Can you call her and talk with her?  She wonders if she's doing something wrong.  "

## 2021-06-08 NOTE — PROGRESS NOTES
ASSESMENT AND PLAN:  Diagnoses and all orders for this visit:    Coronary artery disease due to calcified coronary lesion  Review of the angiography report and cardiology consultation today with the patient and his wife.  He had a very successful treatment of his coronary artery stenoses.  He will continue aspirin and Plavix.  Medication review and reconciliation counseling done today with the patient.  He has a cardiology follow-up appointment coming up in about 2 weeks.  Groin site check today and looks normal.    Type 2 diabetes mellitus  Review of the medical record shows Last A1c was less than 8.  He will be due again for follow-up in May.  He will continue his current dose insulin and follow-up sooner if there is any hypoglycemia or problems.        SUBJECTIVE: 66-year-old male comes in today for follow-up on his angiogram and stenting.  He has known coronary artery disease and had successful placement of stents.  Since then, he's had one brief episode of chest pain.  Patient reports that he was laying down and noticed a mild burning pain in the chest.  He did decide to take nitroglycerin and use 1 tablet under his tongue.  Within about a minute the pain resolved completely and has not returned since then.  The pain did not radiate and was not associated with any shortness of breath or palpitations or near syncope.  He has been doing walking exercise.  He walks with his wife at a local mall.  He is able to walk all the way around the mall without any chest pain or pressure with exertion.  Patient had some mild pain in his right groin with his catheterization but has had no postprocedure pain or swelling.    Past Medical History   Diagnosis Date     Arthritis      Asthma      Bipolar affective disorder      BPH (benign prostatic hyperplasia) 01/06/2015     CAD (coronary artery disease) 07/20/2015     Diabetes mellitus      Dyslipidemia, goal LDL below 70 07/20/2015     Eczema      Essential hypertension       Tardive dyskinesia      Patient Active Problem List   Diagnosis     Drug-induced Tardive Dyskinesia     Dyslipidemia, goal LDL below 70     Essential hypertension     Type 2 diabetes mellitus     Bipolar Disorder     Enlarged prostate with urinary obstruction     Coronary artery disease     Erectile dysfunction     Nonspecific chest pain     Gastroesophageal reflux disease without esophagitis     Accelerating angina     Current Outpatient Prescriptions   Medication Sig Dispense Refill     acetaminophen (TYLENOL) 500 MG tablet Take 500 mg by mouth 2 (two) times a day.       aspirin 81 MG EC tablet Take 81 mg by mouth daily.       atenolol (TENORMIN) 50 MG tablet TAKE 1 TABLET (50 MG TOTAL) BY MOUTH BEDTIME. 90 tablet 3     b complex vitamins tablet Take 1 tablet by mouth daily with lunch.        cholecalciferol, vitamin D3, 1,000 unit tablet Take 2,000 Units by mouth every other day. Takes at 1200       clopidogrel (PLAVIX) 75 mg tablet Take 75 mg by mouth daily with lunch.       clopidogrel (PLAVIX) 75 mg tablet Take 1 tablet (75 mg total) by mouth daily. 31 tablet 11     CONTOUR NEXT STRIPS strips TEST BLOOD SUGAR UP TO FOUR TIMES A DAY AND RECORD RESULTS. 350 each 0     diphenhydrAMINE (BENADRYL) 50 MG capsule TAKE TWO CAPSULES BY MOUTH NIGHTLY AT BEDTIME AS NEEDED 180 capsule 3     DULoxetine (CYMBALTA) 60 MG capsule Take 1 capsule (60 mg total) by mouth daily with lunch. 90 capsule 3     haloperidol (HALDOL) 1 MG tablet TAKE 2 TABLET (2 MG TOTAL) BY MOUTH BEDTIME. 180 tablet 2     insulin needles, disposable, 31 Ndle 3 (three) times a day. For insulin injections.       insulin regular (HUMULIN/NOVOLIN) 100 unit/mL injection Inject 20 Units under the skin daily with lunch.       insulin regular (HUMULIN/NOVOLIN) 100 unit/mL injection Inject 22 Units under the skin daily with supper.       insulin syringe-needle U-100 1 mL 31 gauge x 5/16 Syrg USE AS DIRECTED ONCE DAILY 100 each 2     INULIN (FIBER GUMMIES ORAL)  "Take by mouth bedtime. Takes 2 gummies at night       isosorbide mononitrate (IMDUR) 30 MG 24 hr tablet Take 1 tablet (30 mg total) by mouth daily. 90 tablet 3     isosorbide mononitrate (IMDUR) 30 MG 24 hr tablet Take 1 tablet (30 mg total) by mouth daily. 30 tablet 11     LACTOBACILLUS ACIDOPHILUS (PROBIOTIC ORAL) Take 2 tablets by mouth daily with lunch.        lisinopril (PRINIVIL,ZESTRIL) 20 MG tablet Take 2 tablets (40 mg total) by mouth daily with lunch. 180 tablet 3     magnesium 250 mg Tab Take 1 tablet by mouth every other day. Takes at 1200       metFORMIN (GLUCOPHAGE) 1000 MG tablet TAKE ONE TABLET BY MOUTH TWICE DAILY 180 tablet 1     MULTIVITAMIN (MULTIPLE VITAMIN ORAL) Take 1 tablet by mouth every other day. Takes at 1200       NEEDLES, INSULIN DISPOSABLE (BD ULTRA-FINE ALISHA PEN NEEDLES MISC) Use four times a day.       nitroglycerin (NITROSTAT) 0.4 MG SL tablet Place 1 tablet (0.4 mg total) under the tongue every 5 (five) minutes as needed for chest pain. 25 tablet 0     NOVOLIN N 100 unit/mL injection Inject under the skin. 26 units at noon and 10pm 10 mL 0     omeprazole (PRILOSEC) 20 MG capsule Take 1 capsule (20 mg total) by mouth daily. 90 capsule 4     simvastatin (ZOCOR) 40 MG tablet TAKE  ONE TABLET BY MOUTH EVERY NIGHT AT BEDTIME 90 tablet 89     SYRING W-NDL,DISP,INSUL,0.5 ML (INSULIN SYRINGE MISC) Use As Directed.       tamsulosin (FLOMAX) 0.4 mg Cp24 TAKE 1 CAPSULE (0.4 MG TOTAL) BY MOUTH DAILY. 90 capsule 2     UBIDECARENONE (COQ-10 ORAL) Take 100 mg by mouth bedtime.       No current facility-administered medications for this visit.      History   Smoking Status     Never Smoker   Smokeless Tobacco     Never Used       OBJECTICE:   Visit Vitals     /60 (Patient Site: Right Arm, Patient Position: Sitting, Cuff Size: Adult Large)     Pulse 60     Temp 97.8  F (36.6  C) (Oral)     Resp 20     Ht 6' 1\" (1.854 m)     Wt (!) 252 lb (114.3 kg)     BMI 33.25 kg/m2        No results found " for this or any previous visit (from the past 24 hour(s)).     GEN-alert, appropriate, in no apparent distress   CV-regular rate and rhythm with no murmur   RESP-lungs clear to auscultation   ABDOMINAL-soft and nontender to palpation   EXTREM-warm with no edema   SKIN-catheterization site in the right groin appears normal.  No palpable mass or hematoma.  No pain on palpation.      Mahad Morales

## 2021-06-08 NOTE — PROGRESS NOTES
Assessment/Plan:     1.  Coronary artery disease: Coronary angiogram on December 19, 2016 showed his previous stents were patent, 50-70% stenosis proximal LAD, 90% first diagonal, 75% RPLA, and mild to moderate left circumflex disease.  He received drug-eluting stents to LAD and RPLA and first aid nose treated with balloon angioplasty. Dual antiplatelet therapy is being used with aspirin indefinitely and clopidogrel for 1 year.  We discussed the importance of antiplatelet therapy and talking with his cardiologist prior to stopping these medications for any reason.      Risk factor modification and lifestyle management topics were discussed including managing comorbidities, weight loss, heart healthy diet and exercise.  He is not interested in cardiac rehab.  He was encouraged to continue mall walking.    2.  Dyslipidemia: Mahad Orellana is on simvastatin 40 mg daily.  His LDL is 50.  We discussed a diet low in saturated fat, weight loss, and exercise along with medication for better control of cholesterol.     3.  Hypertension: His blood pressure is well controlled today taking atenolol and lisinopril.    4.  Diabetes:  We discussed the importance of tightly controlled blood sugars to minimize risk of worsening coronary artery disease.  Primary care provider to manage diabetes.    Saulo will follow-up with Dr. Posey on January 31.    Subjective:     Mahad Orellana is seen at [facility] for post coronary intervention follow up.  He has a history of hypertension, dyslipidemia, and coronary artery disease with previous stents in LAD and RCA.  He had increasing chest pain.  Coronary angiogram on December 19, 2016 showed his previous stents were patent, 50-70% stenosis proximal LAD, 90% first diagonal, 75% RPLA, and mild to moderate left circumflex disease.  He received drug-eluting stents to LAD and RPLA and first aid nose treated with balloon angioplasty. Dual antiplatelet therapy is being used with aspirin  indefinitely and clopidogrel for 1 year.  He was started on isosorbide for small vessel disease.     He denies any chest pain since PCI.  He denies fatigue, lightheadedness, shortness of breath and lower extremity edema.      He walks 3 days a week at the mall and is tolerating this activity with no symptoms.    Review of Systems:   General: WNL  Eyes: WNL  Ears/Nose/Throat: WNL  Lungs: WNL  Heart: WNL  Stomach: WNL  Bladder: WNL  Muscle/Joints: Muscle Weakness (chronic)  Skin: Rash  Nervous System: WNL  Mental Health: Depression     Blood: WNL     Patient Active Problem List   Diagnosis     Drug-induced Tardive Dyskinesia     Dyslipidemia, goal LDL below 70     Essential hypertension     Type 2 diabetes mellitus     Bipolar Disorder     Enlarged prostate with urinary obstruction     Coronary artery disease     Erectile dysfunction     Gastroesophageal reflux disease without esophagitis       Past Medical History   Diagnosis Date     Arthritis      Asthma      Bipolar affective disorder      BPH (benign prostatic hyperplasia) 01/06/2015     CAD (coronary artery disease) 07/20/2015     Diabetes mellitus      Dyslipidemia, goal LDL below 70 07/20/2015     Eczema      Essential hypertension      Tardive dyskinesia        Past Surgical History   Procedure Laterality Date     Coronary stent placement  07/20/2015     LAD x 2 and RCA with CHAYA by Dr. Junior     Cardiac catheterization N/A 12/19/2016     Procedure: Coronary Angiogram;  Surgeon: Ozzy Junior MD;  Location: [facility] Cath Lab;  Service:        Family History   Problem Relation Age of Onset     Diabetes Mother      Sudden death Father 75     Diabetes Brother      Heart disease Maternal Grandmother      No Medical Problems Brother      No Medical Problems Son      No Medical Problems Son      No Medical Problems Daughter        Social History     Social History     Marital status:      Spouse name: Rosie     Number of children: 3     Years of  education: N/A     Occupational History      Retired     Social History Main Topics     Smoking status: Never Smoker     Smokeless tobacco: Never Used     Alcohol use No     Drug use: No     Sexual activity: Not on file     Other Topics Concern     Not on file     Social History Narrative       Current Outpatient Prescriptions   Medication Sig Dispense Refill     acetaminophen (TYLENOL) 500 MG tablet Take 500 mg by mouth 2 (two) times a day.       ascorbic acid, vitamin C, (VITAMIN C) 125 mg Chew Chew 3 tablets daily.       aspirin 81 MG EC tablet Take 81 mg by mouth daily.       atenolol (TENORMIN) 50 MG tablet TAKE 1 TABLET (50 MG TOTAL) BY MOUTH BEDTIME. 90 tablet 3     b complex vitamins tablet Take 1 tablet by mouth daily with lunch.        cholecalciferol, vitamin D3, 1,000 unit tablet Take 2,000 Units by mouth every other day. Takes at 1200       clopidogrel (PLAVIX) 75 mg tablet Take 1 tablet (75 mg total) by mouth daily. 31 tablet 11     CONTOUR NEXT STRIPS strips TEST BLOOD SUGAR UP TO FOUR TIMES A DAY AND RECORD RESULTS. 350 each 0     diphenhydrAMINE (BENADRYL) 50 MG capsule TAKE TWO CAPSULES BY MOUTH NIGHTLY AT BEDTIME AS NEEDED 180 capsule 3     DULoxetine (CYMBALTA) 60 MG capsule Take 1 capsule (60 mg total) by mouth daily with lunch. 90 capsule 3     haloperidol (HALDOL) 1 MG tablet TAKE 2 TABLET (2 MG TOTAL) BY MOUTH BEDTIME. 180 tablet 2     insulin needles, disposable, 31 Ndle 3 (three) times a day. For insulin injections.       insulin regular (HUMULIN/NOVOLIN) 100 unit/mL injection Inject 20 Units under the skin daily with lunch.       insulin regular (HUMULIN/NOVOLIN) 100 unit/mL injection Inject 22 Units under the skin daily with supper.       insulin syringe-needle U-100 1 mL 31 gauge x 5/16 Syrg USE AS DIRECTED ONCE DAILY 100 each 2     INULIN (FIBER GUMMIES ORAL) Take by mouth bedtime. Takes 2 gummies at night       isosorbide mononitrate (IMDUR) 30 MG 24 hr tablet Take 1 tablet (30 mg  total) by mouth daily. 90 tablet 3     LACTOBACILLUS ACIDOPHILUS (PROBIOTIC ORAL) Take 2 tablets by mouth daily with lunch.        lisinopril (PRINIVIL,ZESTRIL) 20 MG tablet Take 2 tablets (40 mg total) by mouth daily with lunch. 180 tablet 3     magnesium 250 mg Tab Take 1 tablet by mouth every other day. Takes at 1200       metFORMIN (GLUCOPHAGE) 1000 MG tablet TAKE ONE TABLET BY MOUTH TWICE DAILY 180 tablet 1     MULTIVITAMIN (MULTIPLE VITAMIN ORAL) Take 1 tablet by mouth every other day. Takes at 1200       nitroglycerin (NITROSTAT) 0.4 MG SL tablet Place 1 tablet (0.4 mg total) under the tongue every 5 (five) minutes as needed for chest pain. 25 tablet 0     NOVOLIN N 100 unit/mL injection Inject under the skin. 26 units at noon and 10pm 10 mL 0     omeprazole (PRILOSEC) 20 MG capsule Take 1 capsule (20 mg total) by mouth daily. 90 capsule 4     simvastatin (ZOCOR) 40 MG tablet TAKE  ONE TABLET BY MOUTH EVERY NIGHT AT BEDTIME 90 tablet 89     SYRING W-NDL,DISP,INSUL,0.5 ML (INSULIN SYRINGE MISC) Use As Directed.       tamsulosin (FLOMAX) 0.4 mg Cp24 TAKE 1 CAPSULE (0.4 MG TOTAL) BY MOUTH DAILY. 90 capsule 2     UBIDECARENONE (COQ-10 ORAL) Take 100 mg by mouth bedtime.       NEEDLES, INSULIN DISPOSABLE (BD ULTRA-FINE ALISHA PEN NEEDLES MISC) Use four times a day.       No current facility-administered medications for this visit.        Allergies   Allergen Reactions     Heparin Analogues      Severe recurrent bloody noses       Objective:     Vitals:    01/12/17 1033   BP: 118/74   Pulse: 72   Resp: 16     Body mass index is 33.25 kg/(m^2).      General Appearance:   Alert, cooperative and in no acute distress.   HEENT:  No scleral icterus; the mucous membranes were pink and moist.   Chest: The spine was straight. The chest was symmetric.   Lungs:   Respirations unlabored; the lungs are clear to auscultation.   Cardiovascular:   Regular rhythm. S1 and S2 without murmur, clicks or rubs. Carotid pulses are intact  and symmetrical.  No carotid bruits noted.   Abdomen:  Soft, nontender, nondistended, bowel sounds present   Extremities: No cyanosis, clubbing, or edema.   Skin: No xanthelasma.   Neurologic: Mood and affect are appropriate.   Puncture site:  he reports right femoral site is soft with no bruising.  Radial pulses and Pedal pulses intact and symmetrical.  CMS intact.         Lab Review   Lab Results   Component Value Date    CREATININE 0.95 12/19/2016    BUN 12 12/19/2016     12/19/2016    K 4.4 12/19/2016     12/19/2016    CO2 20 (L) 12/19/2016     CREATININE (mg/dL)   Date Value   12/19/2016 0.95   05/21/2016 0.90   05/08/2016 0.95   07/21/2015 0.84         25 minutes were spent with the patient with greater than 50% spent on education and counseling.      Helga Rueda, [facility]

## 2021-06-08 NOTE — TELEPHONE ENCOUNTER
I called and discussed with the patient and his wife.  We are going to use tramadol for acute exacerbation of his chronic back pain.  50 mg twice daily as needed.  Reviewed the risks and benefits of the medication, he has taken it in the past.    Please schedule a telephone follow-up visit for 1 to 2 weeks to reevaluate.,  Please call the patient's wife to schedule this.  Thank you.

## 2021-06-08 NOTE — TELEPHONE ENCOUNTER
Patient was taken off Cymbalta.  And now the back pain is  Getting worse  Tightness is back. Almost fell out of the tub due to his back tightening up.  Has questions on how to wean off plavix     Ok to call tomorrow  Whomever is covering

## 2021-06-08 NOTE — PROGRESS NOTES
"Assessment and Plan    1. Fall, initial encounter - accidental/2. Acute pain of right knee/3. Knee injury, right, initial encounter  No dizziness or vertigo before hand - nor afterward. Fell onto right knee  - XR Knee Right 1 or 2 VWS; Future - I see know fracture, very mild loss of tib/fib joint space    Mahad frequently talked about  pains  - reviewed by focus today was on his knee. Anytime there is a fall to the knee it can hurt for weeks. Given also mild osteoarthritis, advised trial of diclofenac gel, now also available Over-the-counter     diclofenac sodium (VOLTAREN) 1 % Gel  100 g  1  2020   --    Si GRAMS three times a day PRN TO RIGHT KNEE    Sent to pharmacy as: diclofenac 1 % topical gel (VOLTAREN)    E-Prescribing Status: Receipt confirmed by pharmacy (2020  3:25 PM CDT)        Return if symptoms worsen or fail to improve.    Elva Kong MD     -------------------------------------------    Chief Complaint   Patient presents with     Knee Injury     onset 18, fell on knee 10/10, knee cap cracked, Swelling,   Mahad and his wife lie in a condominium on the first floor; however the ground floor and exit to the building is one floor up. Yesterday he was walking upstairs - wife says he started to go too fast; Mahad says he felt like he was falling back, so he leaned forward, then fell onto right knee nate at th rudy of his stair. It was really painful and he couldn't get it off the ground. Grabbed the cane and had to work hard to get up.  It hurts really bad when he walks.     I asked about how long he was using the cane - he says he fell in  [employer] a couple of years ago - ever since then he has used a cane because he felt lie his knees were rubbery    \"I don't abuse the opioids. \" He mentions several times the duloxetine helped, but also mentions that he had bleeding on duloxetine        Current Outpatient Medications on File Prior to Visit   Medication Sig Dispense Refill     " acetaminophen (TYLENOL) 500 MG tablet Take 500 mg by mouth 2 (two) times a day.       aspirin 81 MG EC tablet Take 81 mg by mouth daily.       atenoloL (TENORMIN) 50 MG tablet TAKE ONE TABLET BY MOUTH EVERY NIGHT AT BEDTIME 90 tablet 3     atorvastatin (LIPITOR) 20 MG tablet Take 1 tablet by mouth daily. 90 tablet 3     blood glucose meter (GLUCOMETER) OneTouch Ultra. Dispense glucometer brand per patient's insurance at pharmacy discretion. Pt testing 3 times per day. 1 each 0     cholecalciferol, vitamin D3, 1,000 unit tablet Take 2,000 Units by mouth every other day. Takes at 1200       clopidogrel (PLAVIX) 75 mg tablet Take 1 tablet (75 mg total) by mouth daily. 90 tablet 2     flash glucose scanning reader (FREESTYLE SALUD 14 DAY READER) Misc Use 1 Units As Directed every 8 (eight) hours. 1 each 0     flash glucose sensor (FREESTYLE SALUD 14 DAY SENSOR) Kit Use 1 Units As Directed every 14 (fourteen) days. 6 kit 3     insulin needles, disposable, 31 Ndle 3 (three) times a day. For insulin injections.       insulin NPH-insulin regular (NOVOLIN 70/30 U-100 INSULIN) 100 unit/mL (70-30) injection Inject 85-90 Units under the skin 2 (two) times a day before meals. 50 mL 11     insulin syringe-needle U-100 1 mL 31 gauge x 5/16 Syrg USE AS DIRECTED ONCE DAILY 100 each 2     isosorbide mononitrate (IMDUR) 30 MG 24 hr tablet TAKE ONE TABLET BY MOUTH ONE TIME DAILY  90 tablet 0     lamoTRIgine (LAMICTAL) 25 MG tablet Take 2 tablets (50 mg total) by mouth daily before breakfast. 60 tablet 6     lisinopriL (PRINIVIL,ZESTRIL) 40 MG tablet Take 1 tablet (40 mg total) by mouth daily. 90 tablet 3     metFORMIN (GLUCOPHAGE) 1000 MG tablet TAKE ONE TABLET BY MOUTH 2 TIMES A DAY WITH MEALS 180 tablet 3     nitroglycerin (NITROSTAT) 0.4 MG SL tablet PLACE ONE TABLET UNDER THE TONGUE EVERY 5 MINUTES AS NEEDED FOR CHEST PAIN.  MAXIMUM DOSE OF 3 TABLETS IN 15 MINUTES. 25 tablet 0     omeprazole (PRILOSEC) 20 MG capsule Take 1 capsule  "(20 mg total) by mouth daily before breakfast. 90 capsule 3     QUEtiapine (SEROQUEL) 100 MG tablet Take 1-3 tablets (100-300 mg total) by mouth every morning. Continue to take 400 mg at bedtime (seperate rx) 90 tablet 11     QUEtiapine (SEROQUEL) 400 MG tablet Take 1 tablet (400 mg total) by mouth at bedtime. 30 tablet 11     tamsulosin (FLOMAX) 0.4 mg cap Take 1 capsule (0.4 mg total) by mouth daily. 90 capsule 3     celecoxib (CELEBREX) 200 MG capsule Take 1 capsule (200 mg total) by mouth every 12 (twelve) hours as needed for pain. 60 capsule 4     generic lancets Dispense brand per patient's insurance at pharmacy discretion. TESTS 3 times per day 350 each 3     ONETOUCH ULTRA BLUE TEST STRIP strips TEST 3 times per day. 350 strip 3     No current facility-administered medications on file prior to visit.          The history section was last reviewed by Yelena Navarrete CMA on May 19, 2020.    Social History     Tobacco Use   Smoking Status Never Smoker   Smokeless Tobacco Never Used       Social History     Substance and Sexual Activity   Alcohol Use No         Vitals:    05/19/20 1412   BP: 122/68   Pulse: 66     Body mass index is 35.66 kg/m .     EXAM:    General appearance - alert, well appearing, and in no distress  Mental status - Pressured speech, tangential, frequent and repetitive venting of frustration with various medical providers, but otherwise flat affect, dress normal  Extremities - right knee   - Mild pain in \"whole knee\" with knee extension.  No pain with resisted knee flexion, ankle dorsi/plantar flexion   - Mild pain in \"whole knee\" with resisted knee extension.  No pain with resisted knee flexion, ankle dorsi/plantar flexion   - no pain to palpation over knee cap; mild pain right tibial plateau, slightly worse laterally than medially  Also   - Frequent small , rolling movements of fingers    - Slightly shuffling gait      "

## 2021-06-08 NOTE — TELEPHONE ENCOUNTER
RN cannot approve Refill Request    RN can NOT refill this medication med is not covered by policy/route to provider     . Last office visit: Visit date not found Last Physical: Visit date not found Last MTM visit: Visit date not found Last visit same specialty: 5/19/2020 Elva Kong MD.  Next visit within 3 mo: Visit date not found  Next physical within 3 mo: Visit date not found      Bella Hirsch, [facility] Triage/Med Refill 6/16/2020    Requested Prescriptions   Pending Prescriptions Disp Refills     nitroglycerin (NITROSTAT) 0.4 MG SL tablet [Pharmacy Med Name: Nitroglycerin Sublingual Tablet Sublingual 0.4 MG]  0     Sig: PLACE ONE TABLET UNDER THE TONGUE EVERY 5 MINUTES AS NEEDED FOR CHEST PAIN.  MAXIMUM DOSE OF 3 TABLETS IN 15 MINUTES.       There is no refill protocol information for this order

## 2021-06-08 NOTE — TELEPHONE ENCOUNTER
Refill Approved    Rx renewed per Medication Renewal Policy. Medication was last renewed on 11/21/19.    Last OV: 2/5/20    Grace Newsome, [facility] Triage/Med Refill 5/10/2020     Requested Prescriptions   Pending Prescriptions Disp Refills     isosorbide mononitrate (IMDUR) 30 MG 24 hr tablet [Pharmacy Med Name: Isosorbide Mononitrate ER Oral Tablet Extended Release 24 Hour 30 MG] 90 tablet 0     Sig: TAKE ONE TABLET BY MOUTH ONE TIME DAILY       Isosorbide Refill Protocol Passed - 5/8/2020  7:01 AM        Passed - Visit with PCP or prescribing provider visit in last 6 months or next 3 months     Last office visit with prescriber/PCP: 2/5/2020 OR same dept: 2/5/2020 Mahad Morales MD OR same specialty: 2/5/2020 Mahad Morales MD Last physical: Visit date not found Last MTM visit: Visit date not found     Next appt within 3 mo: Visit date not found  Next physical within 3 mo: Visit date not found  Prescriber OR PCP: Mahad Morales MD  Last diagnosis associated with med order: 1. Coronary artery disease  - isosorbide mononitrate (IMDUR) 30 MG 24 hr tablet [Pharmacy Med Name: Isosorbide Mononitrate ER Oral Tablet Extended Release 24 Hour 30 MG]; TAKE ONE TABLET BY MOUTH ONE TIME DAILY  Dispense: 90 tablet; Refill: 0    If protocol passes may refill for 6 months if within 3 months of last provider visit (or a total of 9 months).              Passed - Blood pressure filed in past 12 months     BP Readings from Last 1 Encounters:   01/17/20 138/74

## 2021-06-09 NOTE — PROGRESS NOTES
ASSESMENT AND PLAN:  Diagnoses and all orders for this visit:    Primary osteoarthritis involving multiple joints  Extensive review of previous treatment failures and intolerances, discussed options with the patient and his wife, he has self discontinued the Lamictal and that was removed from his active medication list.  Discontinue tramadol.  Will use a short trial of low-dose codon acetaminophen as prescribed below given that he did at least tolerate this medication well in the past and it worked well for him in the past.  Counseling done on the risks and benefits of the medication and if it works well for him and we transition to a longer term chronic pain management program then he would need to come in for controlled substance agreement.  10 days medication supply given and he will follow-up by phone with an update in 10 days to let us know how it is going.    -     oxyCODONE-acetaminophen (PERCOCET/ENDOCET) 5-325 mg per tablet; Take 1 tablet by mouth 2 (two) times a day as needed for pain (for exacerbation of chronic pain).  Dispense: 20 tablet; Refill: 0    Neuropathic pain  -     oxyCODONE-acetaminophen (PERCOCET/ENDOCET) 5-325 mg per tablet; Take 1 tablet by mouth 2 (two) times a day as needed for pain (for exacerbation of chronic pain).  Dispense: 20 tablet; Refill: 0    Chronic midline low back pain without sciatica  -     oxyCODONE-acetaminophen (PERCOCET/ENDOCET) 5-325 mg per tablet; Take 1 tablet by mouth 2 (two) times a day as needed for pain (for exacerbation of chronic pain).  Dispense: 20 tablet; Refill: 0    Bipolar Disorder  I do not want to restart Cymbalta for fear of exacerbating his recent manic episodes.  Patient refuses psychiatry follow-up.  Continue on his current regiment of quetiapine.        Reviewed the risks and benefits of the treatment plan with the patient and/or caregiver and we discussed indications for routine and emergent follow-up.        SUBJECTIVE: Patient has significant  chronic pain issues related to multiple site osteoarthritis, chronic back pain, and neuropathic pain.  Please see previous notes for details.  Unfortunately, patient did not get improvement in his pain with the previously prescribed tramadol, in fact he reports that it actually seemed to make the pain slightly worse.  He continues to have debilitating pain every day.  He feels like this is a major exacerbate her of his mental health.  He has a history of severe underlying bipolar disorder with recent severe episodes of deng.    Past Medical History:   Diagnosis Date     Arthritis      Asthma      Bipolar affective disorder (H)      BPH (benign prostatic hyperplasia) 01/06/2015     CAD (coronary artery disease) 07/20/2015     Diabetes mellitus (H)      Dyslipidemia, goal LDL below 70 07/20/2015     Eczema      Essential hypertension      Tardive dyskinesia      Patient Active Problem List   Diagnosis     Subacute dyskinesia due to drug     Dyslipidemia, goal LDL below 70     Essential hypertension     Type 2 diabetes mellitus (H)     Bipolar Disorder     Enlarged prostate with urinary obstruction     Coronary artery disease     Erectile dysfunction     Gait abnormality     Gastroesophageal reflux disease without esophagitis     Chronic back pain     Neuropathic pain     Primary osteoarthritis involving multiple joints     Current Outpatient Medications   Medication Sig Dispense Refill     acetaminophen (TYLENOL) 500 MG tablet Take 500 mg by mouth 2 (two) times a day.       aspirin 81 MG EC tablet Take 81 mg by mouth daily.       atenoloL (TENORMIN) 50 MG tablet TAKE ONE TABLET BY MOUTH EVERY NIGHT AT BEDTIME 90 tablet 3     atorvastatin (LIPITOR) 20 MG tablet Take 1 tablet by mouth daily. 90 tablet 3     blood glucose meter (GLUCOMETER) OneTouch Ultra. Dispense glucometer brand per patient's insurance at pharmacy discretion. Pt testing 3 times per day. 1 each 0     celecoxib (CELEBREX) 200 MG capsule Take 1 capsule  (200 mg total) by mouth every 12 (twelve) hours as needed for pain. 60 capsule 4     cholecalciferol, vitamin D3, 1,000 unit tablet Take 2,000 Units by mouth every other day. Takes at 1200       clopidogreL (PLAVIX) 75 mg tablet Take 1 tablet (75 mg total) by mouth daily. 30 tablet 0     diclofenac sodium (VOLTAREN) 1 % Gel 4 GRAMS three times a day PRN TO RIGHT KNEE 100 g 1     flash glucose scanning reader (FREESTYLE SALUD 14 DAY READER) Misc Use 1 Units As Directed every 8 (eight) hours. 1 each 0     flash glucose sensor (FREESTYLE SALUD 14 DAY SENSOR) Kit Use 1 Units As Directed every 14 (fourteen) days. 6 kit 3     generic lancets Dispense brand per patient's insurance at pharmacy discretion. TESTS 3 times per day 350 each 3     insulin needles, disposable, 31 Ndle 3 (three) times a day. For insulin injections.       insulin NPH-insulin regular (NOVOLIN 70/30 U-100 INSULIN) 100 unit/mL (70-30) injection Inject 85-90 Units under the skin 2 (two) times a day before meals. 50 mL 11     insulin syringe-needle U-100 1 mL 31 gauge x 5/16 Syrg USE AS DIRECTED ONCE DAILY 100 each 2     isosorbide mononitrate (IMDUR) 30 MG 24 hr tablet TAKE ONE TABLET BY MOUTH ONE TIME DAILY  90 tablet 0     lamoTRIgine (LAMICTAL) 25 MG tablet Take 2 tablets (50 mg total) by mouth daily before breakfast. 60 tablet 6     lisinopriL (PRINIVIL,ZESTRIL) 40 MG tablet Take 1 tablet (40 mg total) by mouth daily. 90 tablet 3     metFORMIN (GLUCOPHAGE) 1000 MG tablet TAKE ONE TABLET BY MOUTH 2 TIMES A DAY WITH MEALS 180 tablet 3     nitroglycerin (NITROSTAT) 0.4 MG SL tablet PLACE ONE TABLET UNDER THE TONGUE EVERY 5 MINUTES AS NEEDED FOR CHEST PAIN.  MAXIMUM DOSE OF 3 TABLETS IN 15 MINUTES. 25 tablet 1     omeprazole (PRILOSEC) 20 MG capsule Take 1 capsule (20 mg total) by mouth daily before breakfast. 90 capsule 3     ONETOUCH ULTRA BLUE TEST STRIP strips TEST 3 times per day. 350 strip 3     QUEtiapine (SEROQUEL) 100 MG tablet Take 1-3 tablets  (100-300 mg total) by mouth every morning. Continue to take 400 mg at bedtime (seperate rx) 90 tablet 11     QUEtiapine (SEROQUEL) 400 MG tablet Take 1 tablet (400 mg total) by mouth at bedtime. 30 tablet 11     tamsulosin (FLOMAX) 0.4 mg cap Take 1 capsule (0.4 mg total) by mouth daily. 90 capsule 3     oxyCODONE-acetaminophen (PERCOCET/ENDOCET) 5-325 mg per tablet Take 1 tablet by mouth 2 (two) times a day as needed for pain (for exacerbation of chronic pain). 20 tablet 0     No current facility-administered medications for this visit.      Social History     Tobacco Use   Smoking Status Never Smoker   Smokeless Tobacco Never Used       OBJECTICE: There were no vitals taken for this visit.     No results found for this or any previous visit (from the past 24 hour(s)).        Mahad Morales

## 2021-06-09 NOTE — TELEPHONE ENCOUNTER
Refill Approved    Rx renewed per Medication Renewal Policy. Medication was last renewed on 5/10/20, last OV 6/18/20.    Kristy York, [facility] Triage/Med Refill 7/19/2020     Requested Prescriptions   Pending Prescriptions Disp Refills     isosorbide mononitrate (IMDUR) 30 MG 24 hr tablet [Pharmacy Med Name: Isosorbide Mononitrate ER Oral Tablet Extended Release 24 Hour 30 MG] 90 tablet 0     Sig: TAKE ONE TABLET BY MOUTH ONE TIME DAILY .       Isosorbide Refill Protocol Passed - 7/18/2020  1:19 PM        Passed - Visit with PCP or prescribing provider visit in last 6 months or next 3 months     Last office visit with prescriber/PCP: 2/5/2020 OR same dept: 5/19/2020 Elva Kong MD OR same specialty: 5/19/2020 Elva Kong MD Last physical: Visit date not found Last MTM visit: Visit date not found     Next appt within 3 mo: Visit date not found  Next physical within 3 mo: Visit date not found  Prescriber OR PCP: Mahad Morales MD  Last diagnosis associated with med order: 1. Coronary artery disease  - isosorbide mononitrate (IMDUR) 30 MG 24 hr tablet [Pharmacy Med Name: Isosorbide Mononitrate ER Oral Tablet Extended Release 24 Hour 30 MG]; TAKE ONE TABLET BY MOUTH ONE TIME DAILY   Dispense: 90 tablet; Refill: 0    If protocol passes may refill for 6 months if within 3 months of last provider visit (or a total of 9 months).              Passed - Blood pressure filed in past 12 months     BP Readings from Last 1 Encounters:   05/19/20 122/68

## 2021-06-09 NOTE — TELEPHONE ENCOUNTER
Controlled Substance Refill Request  Medication Name:   Requested Prescriptions     Pending Prescriptions Disp Refills     oxyCODONE-acetaminophen (PERCOCET/ENDOCET) 5-325 mg per tablet 20 tablet 0     Sig: Take 1 tablet by mouth 2 (two) times a day as needed for pain (for exacerbation of chronic pain).     Date Last Fill: 6/18/2020  Requested Pharmacy: [facility]  Submit electronically to pharmacy  Controlled Substance Agreement on file:   Encounter-Level CSA Scan Date:    There are no encounter-level csa scan date.        Last office visit:  6/18/2020    Patients wife states this medication is working somewhat. Would like to try for a months worth of pills to see if it will start working a little bit better. Is taking on in the morning and one at night.  Please call when sent to the pharmacy.

## 2021-06-09 NOTE — TELEPHONE ENCOUNTER
Last office visit: 06/18/20  Last ordered: 06/18/20  Last lab check: 12/31/19  Next appointment: None.     Chart reviewed. Please review findings below.     ASSESMENT AND PLAN:  Diagnoses and all orders for this visit:     Primary osteoarthritis involving multiple joints  Extensive review of previous treatment failures and intolerances, discussed options with the patient and his wife, he has self discontinued the Lamictal and that was removed from his active medication list.  Discontinue tramadol.  Will use a short trial of low-dose codon acetaminophen as prescribed below given that he did at least tolerate this medication well in the past and it worked well for him in the past.  Counseling done on the risks and benefits of the medication and if it works well for him and we transition to a longer term chronic pain management program then he would need to come in for controlled substance agreement.  10 days medication supply given and he will follow-up by phone with an update in 10 days to let us know how it is going.     -     oxyCODONE-acetaminophen (PERCOCET/ENDOCET) 5-325 mg per tablet; Take 1 tablet by mouth 2 (two) times a day as needed for pain (for exacerbation of chronic pain).  Dispense: 20 tablet; Refill: 0     Neuropathic pain  -     oxyCODONE-acetaminophen (PERCOCET/ENDOCET) 5-325 mg per tablet; Take 1 tablet by mouth 2 (two) times a day as needed for pain (for exacerbation of chronic pain).  Dispense: 20 tablet; Refill: 0     Chronic midline low back pain without sciatica  -     oxyCODONE-acetaminophen (PERCOCET/ENDOCET) 5-325 mg per tablet; Take 1 tablet by mouth 2 (two) times a day as needed for pain (for exacerbation of chronic pain).  Dispense: 20 tablet; Refill: 0     Bipolar Disorder  I do not want to restart Cymbalta for fear of exacerbating his recent manic episodes.  Patient refuses psychiatry follow-up.  Continue on his current regiment of quetiapine.

## 2021-06-09 NOTE — PROGRESS NOTES
ASSESMENT AND PLAN:  Diagnoses and all orders for this visit:    Coronary artery disease due to calcified coronary lesion  Reviewed the emergency room evaluation and notes today with the patient and his wife.  I agree that the pain is most likely musculoskeletal.  The emergency room physician and recommended follow-up with cardiology and I counseled the patient on this today that he should call and schedule [facility] cardiology follow-up.  We did a medication review and counseling today as well, continue current medical management of his coronary artery disease.  We reviewed indications for routine and emergent follow-up.        SUBJECTIVE: 67 year male comes in today for follow-up on a recent emergency room visit for chest pain.  Patient had fallen and developed some chest pain and arm pain after the fall.  However, he noticed that if he took a nitroglycerin and it seemed to improve his chest pain and therefore he went to the emergency room worried about his heart.  He does have a known history of coronary artery disease and is status post stenting.  He is on Plavix and aspirin, he did not have any bleeding after the fall.  He was evaluated in the ER including negative EKG, negative troponin, normal chest x-ray.  His chest pain has slowly improved.  He reports a mild to moderate severity pain on the right side of the chest and radiated around towards the right lateral chest wall that gets worse if he reaches his arm back behind his back or twists his shoulder and thorax into certain positions.  He is not getting associated shortness of breath or palpitations or near syncope or nausea or diaphoresis.    Past Medical History:   Diagnosis Date     Arthritis      Asthma      Bipolar affective disorder      BPH (benign prostatic hyperplasia) 01/06/2015     CAD (coronary artery disease) 07/20/2015     Diabetes mellitus      Dyslipidemia, goal LDL below 70 07/20/2015     Eczema      Essential hypertension      Tardive  dyskinesia      Patient Active Problem List   Diagnosis     Drug-induced Tardive Dyskinesia     Dyslipidemia, goal LDL below 70     Essential hypertension     Type 2 diabetes mellitus     Bipolar Disorder     Enlarged prostate with urinary obstruction     Coronary artery disease     Erectile dysfunction     Gastroesophageal reflux disease without esophagitis     Current Outpatient Prescriptions   Medication Sig Dispense Refill     acetaminophen (TYLENOL) 500 MG tablet Take 500 mg by mouth 2 (two) times a day.       ascorbic acid, vitamin C, (VITAMIN C) 125 mg Chew Chew 3 tablets daily.       aspirin 81 MG EC tablet Take 81 mg by mouth daily.       atenolol (TENORMIN) 50 MG tablet TAKE 1 TABLET (50 MG TOTAL) BY MOUTH BEDTIME. 90 tablet 3     b complex vitamins tablet Take 1 tablet by mouth daily with lunch.        cholecalciferol, vitamin D3, 1,000 unit tablet Take 2,000 Units by mouth every other day. Takes at 1200       clopidogrel (PLAVIX) 75 mg tablet Take 1 tablet (75 mg total) by mouth daily. 90 tablet 4     CONTOUR NEXT STRIPS strips TEST BLOOD SUGAR UP TO FOUR TIMES A DAY AND RECORD RESULTS. 350 each 0     diphenhydrAMINE (BENADRYL) 50 MG capsule TAKE TWO CAPSULES BY MOUTH NIGHTLY AT BEDTIME AS NEEDED 180 capsule 3     DULoxetine (CYMBALTA) 60 MG capsule Take 1 capsule (60 mg total) by mouth daily with lunch. 90 capsule 3     haloperidol (HALDOL) 1 MG tablet TAKE 2 TABLET (2 MG TOTAL) BY MOUTH BEDTIME. 180 tablet 2     insulin needles, disposable, 31 Ndle 3 (three) times a day. For insulin injections.       insulin regular (HUMULIN/NOVOLIN) 100 unit/mL injection Inject 20 Units under the skin daily with lunch.       insulin regular (HUMULIN/NOVOLIN) 100 unit/mL injection Inject 22 Units under the skin daily with supper. 26 units       insulin syringe-needle U-100 1 mL 31 gauge x 5/16 Syrg USE AS DIRECTED ONCE DAILY 100 each 2     INULIN (FIBER GUMMIES ORAL) Take by mouth bedtime. Takes 2 gummies at night    "    isosorbide mononitrate (IMDUR) 30 MG 24 hr tablet Take 1 tablet (30 mg total) by mouth daily. 90 tablet 3     LACTOBACILLUS ACIDOPHILUS (PROBIOTIC ORAL) Take 2 tablets by mouth daily with lunch.        lisinopril (PRINIVIL,ZESTRIL) 20 MG tablet Take 2 tablets (40 mg total) by mouth daily with lunch. 180 tablet 3     magnesium 250 mg Tab Take 1 tablet by mouth every other day. Takes at 1200       metFORMIN (GLUCOPHAGE) 1000 MG tablet TAKE ONE TABLET BY MOUTH TWICE DAILY 180 tablet 1     MULTIVITAMIN (MULTIPLE VITAMIN ORAL) Take 1 tablet by mouth every other day. Takes at 1200       NEEDLES, INSULIN DISPOSABLE (BD ULTRA-FINE ALISHA PEN NEEDLES MISC) Use four times a day.       nitroglycerin (NITROSTAT) 0.4 MG SL tablet DISSOLVE 1 TAB. UNDER TONGUE AS NEED EVERY 5 MIN. & CALL 911 IF NO RELEIF. CONTINUE 1 TAB./5 MIN. UNTIL HELP COMES 25 tablet 2     NOVOLIN N 100 unit/mL injection Inject under the skin. 26 units at noon and 10pm 10 mL 0     omeprazole (PRILOSEC) 20 MG capsule Take 1 capsule (20 mg total) by mouth daily. 90 capsule 4     simvastatin (ZOCOR) 40 MG tablet TAKE  ONE TABLET BY MOUTH EVERY NIGHT AT BEDTIME 90 tablet 89     SYRING W-NDL,DISP,INSUL,0.5 ML (INSULIN SYRINGE MISC) Use As Directed.       tamsulosin (FLOMAX) 0.4 mg Cp24 TAKE 1 CAPSULE (0.4 MG TOTAL) BY MOUTH DAILY. 90 capsule 2     UBIDECARENONE (COQ-10 ORAL) Take 100 mg by mouth bedtime.       No current facility-administered medications for this visit.      History   Smoking Status     Never Smoker   Smokeless Tobacco     Never Used       OBJECTICE:   Visit Vitals     /60 (Patient Site: Left Arm, Patient Position: Sitting, Cuff Size: Adult Large)     Pulse 80     Temp 97.7  F (36.5  C) (Oral)     Resp 20     Ht 6' 1\" (1.854 m)     Wt (!) 249 lb 8 oz (113.2 kg)     BMI 32.92 kg/m2        No results found for this or any previous visit (from the past 24 hour(s)).     GEN-alert, appropriate, in no apparent distress   CV-regular rate and " rhythm, distant tones   RESP-lungs clear to auscultation   Musculoskeletal-some repetition of his pain with palpation of his right anterior chest wall   EXTREM-warm with no edema and no calf tenderness   SKIN-no vesicles or other rash overlying his area of pain      Mahad Morales

## 2021-06-09 NOTE — TELEPHONE ENCOUNTER
RN cannot approve Refill Request    RN can NOT refill this medication med is not covered by policy/route to provider. Last office visit: 5/19/2020 Elva Kong MD Last Physical: Visit date not found Last MTM visit: Visit date not found Last visit same specialty: 5/19/2020 Elva Kong MD.  Next visit within 3 mo: Visit date not found  Next physical within 3 mo: Visit date not found      Kristy York, [facility] Triage/Med Refill 7/11/2020    Requested Prescriptions   Pending Prescriptions Disp Refills     diclofenac sodium (VOLTAREN) 1 % Gel [Pharmacy Med Name: Diclofenac Sodium Transdermal Gel 1 %] 100 g 0     Sig: Apply 4 GRAMS three times a day as needed TO RIGHT KNEE       There is no refill protocol information for this order

## 2021-06-10 NOTE — PROGRESS NOTES
ASSESMENT AND PLAN:  Diagnoses and all orders for this visit:    Bronchitis versus pneumonia  Discussed the treatment plan today with the patient and his wife.  We reviewed the risks and benefits of the medication.  We discussed indications for routine and emergent follow-up.  He will take yogurt once daily for 10 days.  -     azithromycin (ZITHROMAX) 250 MG tablet; Take two tablets by mouth today, then one tablet by mouth daily until gone.  Dispense: 6 tablet; Refill: 0  -     albuterol (PROAIR HFA;PROVENTIL HFA;VENTOLIN HFA) 90 mcg/actuation inhaler; Inhale 1-2 puffs every 4 (four) hours as needed for wheezing.  Dispense: 1 each; Refill: 1    Coronary artery disease due to calcified coronary lesion  Stable.  No nitroglycerin use in the last 1 month as detailed below.  Still, I reminded the patient that he still has not had his cardiology follow-up appointment scheduled yet as his last hospital evaluation and I reminded him to do so.    Chronic back pain, mild current exacerbation  Likely positional triggers as described below.  We discussed and counseled on ways to correct this.  Continue Cymbalta and acetaminophen.  Follow-up if not improving.        SUBJECTIVE: 67-year-old male comes in today with several concerns.  Since I saw him last in the clinic, he has not seen a cardiologist or scheduled the appointment.  However, he reports that he feels his heart is been doing good, he has not had to take any nitroglycerin since his last visit.  His only chest pain has been the pain described below with the coughing and radiating from his back.  No exertional chest pain.  He has a history of some severe back pain that had previously come under control with the initiation of Cymbalta.  Patient reports that after he stands for 10-15 minutes in front of the mirror when he is doing shaving and brushing his teeth etc. he will start to develop some moderately severe pain that comes out of the low back and thoracic area and  wraps around to his lower chest bilaterally.  It is very similar to the symptoms that he had previously with a flareup of his back pain.  He cannot think of any specific injury.  When he does  front of the mirror he does bend forward and leaning forward for prolonged period of time.  He has been taking his usual medications.  His main concern today is a 5 day history of cough, productive of green sputum, associated with some wheezing and tightness in his breathing.  Also some burning chest pain on the lateral aspect of the chest bilaterally with coughing.  He has had some subjective feelings of fever and chills.  In the past, the patient had been on an inhaler for similar symptoms and responded well to this.  In the past, he has tolerated azithromycin well without side effects or problems.    Past Medical History:   Diagnosis Date     Arthritis      Asthma      Bipolar affective disorder      BPH (benign prostatic hyperplasia) 01/06/2015     CAD (coronary artery disease) 07/20/2015     Diabetes mellitus      Dyslipidemia, goal LDL below 70 07/20/2015     Eczema      Essential hypertension      Tardive dyskinesia      Patient Active Problem List   Diagnosis     Drug-induced Tardive Dyskinesia     Dyslipidemia, goal LDL below 70     Essential hypertension     Type 2 diabetes mellitus     Bipolar Disorder     Enlarged prostate with urinary obstruction     Coronary artery disease     Erectile dysfunction     Gastroesophageal reflux disease without esophagitis     Current Outpatient Prescriptions   Medication Sig Dispense Refill     acetaminophen (TYLENOL) 500 MG tablet Take 500 mg by mouth 2 (two) times a day.       ascorbic acid, vitamin C, (VITAMIN C) 125 mg Chew Chew 3 tablets daily.       aspirin 81 MG EC tablet Take 81 mg by mouth daily.       atenolol (TENORMIN) 50 MG tablet TAKE 1 TABLET (50 MG TOTAL) BY MOUTH BEDTIME. 90 tablet 3     b complex vitamins tablet Take 1 tablet by mouth daily with lunch.         cholecalciferol, vitamin D3, 1,000 unit tablet Take 2,000 Units by mouth every other day. Takes at 1200       clopidogrel (PLAVIX) 75 mg tablet Take 1 tablet (75 mg total) by mouth daily. 90 tablet 4     CONTOUR NEXT STRIPS strips TEST BLOOD SUGAR UP TO FOUR TIMES A DAY AND RECORD RESULTS. 350 each 1     diphenhydrAMINE (BENADRYL) 50 MG capsule TAKE TWO CAPSULES BY MOUTH NIGHTLY AT BEDTIME AS NEEDED 180 capsule 3     DULoxetine (CYMBALTA) 60 MG capsule Take 1 capsule (60 mg total) by mouth daily with lunch. 90 capsule 3     haloperidol (HALDOL) 1 MG tablet TAKE 2 TABLET (2 MG TOTAL) BY MOUTH BEDTIME. 180 tablet 2     insulin needles, disposable, 31 Ndle 3 (three) times a day. For insulin injections.       insulin regular (HUMULIN/NOVOLIN) 100 unit/mL injection Inject 20 Units under the skin daily with lunch.       insulin regular (HUMULIN/NOVOLIN) 100 unit/mL injection Inject 22 Units under the skin daily with supper. 26 units       insulin syringe-needle U-100 1 mL 31 gauge x 5/16 Syrg USE AS DIRECTED ONCE DAILY 100 each 2     INULIN (FIBER GUMMIES ORAL) Take by mouth bedtime. Takes 2 gummies at night       isosorbide mononitrate (IMDUR) 30 MG 24 hr tablet Take 1 tablet (30 mg total) by mouth daily. 90 tablet 3     LACTOBACILLUS ACIDOPHILUS (PROBIOTIC ORAL) Take 2 tablets by mouth daily with lunch.        lisinopril (PRINIVIL,ZESTRIL) 20 MG tablet Take 2 tablets (40 mg total) by mouth daily with lunch. 180 tablet 3     magnesium 250 mg Tab Take 1 tablet by mouth every other day. Takes at 1200       metFORMIN (GLUCOPHAGE) 1000 MG tablet TAKE ONE TABLET BY MOUTH TWICE DAILY 180 tablet 1     MULTIVITAMIN (MULTIPLE VITAMIN ORAL) Take 1 tablet by mouth every other day. Takes at 1200       NEEDLES, INSULIN DISPOSABLE (BD ULTRA-FINE ALISHA PEN NEEDLES MISC) Use four times a day.       nitroglycerin (NITROSTAT) 0.4 MG SL tablet DISSOLVE 1 TAB. UNDER TONGUE AS NEED EVERY 5 MIN. & CALL 911 IF NO RELEIF. CONTINUE 1 TAB./5  "MIN. UNTIL HELP COMES 25 tablet 2     NOVOLIN N 100 unit/mL injection Inject under the skin. 26 units at noon and 10pm 10 mL 0     omeprazole (PRILOSEC) 20 MG capsule Take 1 capsule (20 mg total) by mouth daily. 90 capsule 4     simvastatin (ZOCOR) 40 MG tablet TAKE  ONE TABLET BY MOUTH EVERY NIGHT AT BEDTIME 90 tablet 1     SYRING W-NDL,DISP,INSUL,0.5 ML (INSULIN SYRINGE MISC) Use As Directed.       tamsulosin (FLOMAX) 0.4 mg Cp24 TAKE 1 CAPSULE (0.4 MG TOTAL) BY MOUTH DAILY. 90 capsule 2     UBIDECARENONE (COQ-10 ORAL) Take 100 mg by mouth bedtime.       albuterol (PROAIR HFA;PROVENTIL HFA;VENTOLIN HFA) 90 mcg/actuation inhaler Inhale 1-2 puffs every 4 (four) hours as needed for wheezing. 1 each 1     azithromycin (ZITHROMAX) 250 MG tablet Take two tablets by mouth today, then one tablet by mouth daily until gone. 6 tablet 0     No current facility-administered medications for this visit.      History   Smoking Status     Never Smoker   Smokeless Tobacco     Never Used       OBJECTICE: /64 (Patient Site: Right Arm, Patient Position: Sitting, Cuff Size: Adult Large)  Pulse 82  Temp 98.3  F (36.8  C) (Oral)   Resp 16  Ht 6' 1\" (1.854 m)  Wt (!) 246 lb (111.6 kg)  SpO2 94%  BMI 32.46 kg/m2     No results found for this or any previous visit (from the past 24 hour(s)).     GEN-alert, appropriate, in no apparent distress   HEENT-oropharynx is clear, neck is supple with no palpable mass or lymphadenopathy    CV-regular rate and rhythm with no murmur   RESP-some diffuse mild expiratory wheezing and diffuse coarse breath sounds   Neurologic-strength and sensation are intact and symmetric.   SKIN-no vesicles or other rash overlying his areas of pain      Mahad Morales          "

## 2021-06-10 NOTE — TELEPHONE ENCOUNTER
Does PCP wish CMT to call pt and or wife to deliver message or is PCP planning on phoning pt?  Sorry, just need clarification as they wished to speak to PCP.  Thanks

## 2021-06-10 NOTE — TELEPHONE ENCOUNTER
Medication Question or Clarification  Who is calling: Rosie   What medication are you calling about (include dose and sig)?:    Disp  Refills  Start  End  INGRID    DULoxetine 40 mg CpDR (Discontinued)  90 capsule  2  6/29/2019 7/16/2019  No    Sig: Take 40 mg (1 capsule) by mouth every morning.    Sent to pharmacy as: DULoxetine 40 mg CpDR    E-Prescribing Status: Receipt confirmed by pharmacy (6/29/2019  7:51 PM CDT)        Who prescribed the medication?: Mahad Morales MD   What is your question/concern?: Caller is wanting to know if patient can start on medication again since he is having really bad back pains. Caller would really like a call back to go over more questions if Mahad Morales MD nurse can call them back.   Requested Pharmacy: n/a  Okay to leave a detailed message?: Yes

## 2021-06-10 NOTE — TELEPHONE ENCOUNTER
The patient does not require Plavix anymore, previously his cardiologist had discussed the pluses and minuses of staying on Plavix with the patient but it is not a required medication anymore.  He is past due for follow-up with Dr. Posey his cardiologist and she had recommended that he come in for follow-up.  He should not take the Cymbalta.  Other pain medications that have been tried have made him drowsy or sleepy but we could retry those medications if he is interested.

## 2021-06-10 NOTE — TELEPHONE ENCOUNTER
RN cannot approve Refill Request    RN can NOT refill this medication PCP messaged that patient is overdue for Labs. Last office visit: 2/5/2020 Mahad Morales MD Last Physical: 9/13/2017 Last MTM visit: Visit date not found Last visit same specialty: 5/19/2020 Elva Kong MD.  Next visit within 3 mo: Visit date not found  Next physical within 3 mo: Visit date not found      Kristy York, [facility] Triage/Med Refill 8/2/2020    Requested Prescriptions   Pending Prescriptions Disp Refills     metFORMIN (GLUCOPHAGE) 1000 MG tablet [Pharmacy Med Name: metFORMIN HCl Oral Tablet 1000 MG] 180 tablet 0     Sig: TAKE ONE TABLET BY MOUTH 2 TIMES A DAY WITH MEALS.       Metformin Refill Protocol Failed - 8/2/2020  7:02 AM        Failed - LFT or AST or ALT in last 12 months     Albumin   Date Value Ref Range Status   04/25/2018 4.1 3.5 - 5.0 g/dL Final     Bilirubin, Total   Date Value Ref Range Status   04/25/2018 0.8 0.0 - 1.0 mg/dL Final     Bilirubin, Direct   Date Value Ref Range Status   12/12/2014 0.4 <=0.5 mg/dL Final     Alkaline Phosphatase   Date Value Ref Range Status   04/25/2018 148 (H) 45 - 120 U/L Final     AST   Date Value Ref Range Status   04/25/2018 29 0 - 40 U/L Final     ALT   Date Value Ref Range Status   04/25/2018 45 0 - 45 U/L Final     Protein, Total   Date Value Ref Range Status   04/25/2018 7.1 6.0 - 8.0 g/dL Final                Failed - A1C in last 6 months     Hemoglobin A1c   Date Value Ref Range Status   12/31/2019 9.6 (H) 3.5 - 6.0 % Final               Failed - Microalbumin in last year      Microalbumin, Random Urine   Date Value Ref Range Status   07/16/2019 1.10 0.00 - 1.99 mg/dL Final                  Passed - Blood pressure in last 12 months     BP Readings from Last 1 Encounters:   05/19/20 122/68             Passed - GFR or Serum Creatinine in last 6 months     GFR MDRD Non Af Amer   Date Value Ref Range Status   12/31/2019 >60 >60 mL/min/1.73m2 Final     GFR MDRD Af  Amer   Date Value Ref Range Status   12/31/2019 >60 >60 mL/min/1.73m2 Final             Passed - Visit with PCP or prescribing provider visit in last 6 months or next 3 months     Last office visit with prescriber/PCP: 2/5/2020 OR same dept: 5/19/2020 Elva Kong MD OR same specialty: 5/19/2020 Elva Kong MD Last physical: Visit date not found Last MTM visit: Visit date not found         Next appt within 3 mo: Visit date not found  Next physical within 3 mo: Visit date not found  Prescriber OR PCP: Mahad Morales MD  Last diagnosis associated with med order: 1. DM (diabetes mellitus) (H)  - metFORMIN (GLUCOPHAGE) 1000 MG tablet [Pharmacy Med Name: metFORMIN HCl Oral Tablet 1000 MG]; TAKE ONE TABLET BY MOUTH 2 TIMES A DAY WITH MEALS  Dispense: 180 tablet; Refill: 0    2. Coronary artery disease due to calcified coronary lesion  - nitroglycerin (NITROSTAT) 0.4 MG SL tablet [Pharmacy Med Name: Nitroglycerin Sublingual Tablet Sublingual 0.4 MG]; PLACE ONE TABLET UNDER THE TONGUE EVERY 5 MINUTES AS NEEDED FOR CHEST PAIN.  MAXIMUM DOSE OF 3 TABLETS IN 15 MINUTES.; Refill: 0     If protocol passes may refill for 12 months if within 3 months of last provider visit (or a total of 15 months).              nitroglycerin (NITROSTAT) 0.4 MG SL tablet [Pharmacy Med Name: Nitroglycerin Sublingual Tablet Sublingual 0.4 MG]  0     Sig: PLACE ONE TABLET UNDER THE TONGUE EVERY 5 MINUTES AS NEEDED FOR CHEST PAIN.  MAXIMUM DOSE OF 3 TABLETS IN 15 MINUTES.       There is no refill protocol information for this order

## 2021-06-10 NOTE — PROGRESS NOTES
ASSESMENT AND PLAN:  Diagnoses and all orders for this visit:    Type 2 diabetes mellitus  -     Glycosylated Hemoglobin A1c done today has increased from 7.9 last time up to 8.9 today.  Counseled the patient on his walking program, dietary compliance, and we are going to increase his N insulin as prescribed below.  If he starts getting any readings lower than 70 or problems with hypoglycemia he will call right away.        -     NOVOLIN N 100 unit/mL injection; Inject 31 Units under the skin 2 (two) times a day before meals.  Dispense: 10 mL; Refill: 6    Dermatitis  -     triamcinolone (KENALOG) 0.1 % cream; Apply topically 2 (two) times a day. For up to 3 weeks  Dispense: 80 g; Refill: 1  We reviewed the risks and benefits of the medication.              SUBJECTIVE: 67-year-old male comes in today for follow-up on his type 2 diabetes.  He had been in earlier this month with a respiratory infection.  He completed his medications and his symptoms have resolved completely.  No shortness of breath.  He did have one episode of right-sided chest pain since his last visit.  It lasted a few minutes, was mild in severity, it did cause him to use a nitroglycerin and the pain resolved after taking one nitroglycerin.  Patient notes his blood sugars have been running higher over this past month.  Most readings have been in the 130s or higher.  The lowest reading he has had in the past few weeks has been 95.  Patient reports a itchy red rash over his right wrist.  It is in an area where he had previously had a problem after an IV placement.  At times the itching can be moderately intense, he has not been using any topical treatments on it.  There is been no spreading or streaking redness and no fever.  Been going on for many months.  Patient has a history of wax buildup in his ears, he is wondering whether he is to have his ears irrigated today.  No sudden changes in hearing, no ear pain.    Past Medical History:   Diagnosis  Date     Arthritis      Asthma      Bipolar affective disorder      BPH (benign prostatic hyperplasia) 01/06/2015     CAD (coronary artery disease) 07/20/2015     Diabetes mellitus      Dyslipidemia, goal LDL below 70 07/20/2015     Eczema      Essential hypertension      Tardive dyskinesia      Patient Active Problem List   Diagnosis     Drug-induced Tardive Dyskinesia     Dyslipidemia, goal LDL below 70     Essential hypertension     Type 2 diabetes mellitus     Bipolar Disorder     Enlarged prostate with urinary obstruction     Coronary artery disease     Erectile dysfunction     Gastroesophageal reflux disease without esophagitis     Current Outpatient Prescriptions   Medication Sig Dispense Refill     acetaminophen (TYLENOL) 500 MG tablet Take 500 mg by mouth 2 (two) times a day.       albuterol (PROAIR HFA;PROVENTIL HFA;VENTOLIN HFA) 90 mcg/actuation inhaler Inhale 1-2 puffs every 4 (four) hours as needed for wheezing. 1 each 1     ascorbic acid, vitamin C, (VITAMIN C) 125 mg Chew Chew 3 tablets daily.       aspirin 81 MG EC tablet Take 81 mg by mouth daily.       atenolol (TENORMIN) 50 MG tablet TAKE 1 TABLET (50 MG TOTAL) BY MOUTH BEDTIME. 90 tablet 3     b complex vitamins tablet Take 1 tablet by mouth daily with lunch.        cholecalciferol, vitamin D3, 1,000 unit tablet Take 2,000 Units by mouth every other day. Takes at 1200       clopidogrel (PLAVIX) 75 mg tablet Take 1 tablet (75 mg total) by mouth daily. 90 tablet 4     CONTOUR NEXT STRIPS strips TEST BLOOD SUGAR UP TO FOUR TIMES A DAY AND RECORD RESULTS. 350 each 1     diphenhydrAMINE (BENADRYL) 50 MG capsule TAKE TWO CAPSULES BY MOUTH NIGHTLY AT BEDTIME AS NEEDED 180 capsule 3     haloperidol (HALDOL) 1 MG tablet TAKE 2 TABLET (2 MG TOTAL) BY MOUTH BEDTIME. 180 tablet 2     insulin needles, disposable, 31 Ndle 3 (three) times a day. For insulin injections.       insulin regular (HUMULIN/NOVOLIN) 100 unit/mL injection Inject 20 Units under the skin  daily with lunch.       insulin regular (HUMULIN/NOVOLIN) 100 unit/mL injection Inject 22 Units under the skin daily with supper. 26 units       insulin syringe-needle U-100 1 mL 31 gauge x 5/16 Syrg USE AS DIRECTED ONCE DAILY 100 each 2     INULIN (FIBER GUMMIES ORAL) Take by mouth bedtime. Takes 2 gummies at night       isosorbide mononitrate (IMDUR) 30 MG 24 hr tablet Take 1 tablet (30 mg total) by mouth daily. 90 tablet 3     isosorbide mononitrate (IMDUR) 30 MG 24 hr tablet TAKE 1 TABLET (30 MG TOTAL) BY MOUTH DAILY. 90 tablet 1     LACTOBACILLUS ACIDOPHILUS (PROBIOTIC ORAL) Take 2 tablets by mouth daily with lunch.        lisinopril (PRINIVIL,ZESTRIL) 20 MG tablet Take 2 tablets (40 mg total) by mouth daily with lunch. 180 tablet 3     lisinopril (PRINIVIL,ZESTRIL) 20 MG tablet TAKE 2 TABLETS (40 MG TOTAL)  BY MOUTH DAILY WITH LUNCH. 180 tablet 2     magnesium 250 mg Tab Take 1 tablet by mouth every other day. Takes at 1200       metFORMIN (GLUCOPHAGE) 1000 MG tablet TAKE ONE TABLET BY MOUTH TWICE DAILY 180 tablet 1     MULTIVITAMIN (MULTIPLE VITAMIN ORAL) Take 1 tablet by mouth every other day. Takes at 1200       NEEDLES, INSULIN DISPOSABLE (BD ULTRA-FINE ALISHA PEN NEEDLES MISC) Use four times a day.       nitroglycerin (NITROSTAT) 0.4 MG SL tablet DISSOLVE 1 TAB. UNDER TONGUE AS NEED EVERY 5 MIN. & CALL 911 IF NO RELEIF. CONTINUE 1 TAB./5 MIN. UNTIL HELP COMES 25 tablet 2     NOVOLIN N 100 unit/mL injection Inject 31 Units under the skin 2 (two) times a day before meals. 10 mL 6     omeprazole (PRILOSEC) 20 MG capsule Take 1 capsule (20 mg total) by mouth daily. 90 capsule 4     simvastatin (ZOCOR) 40 MG tablet TAKE  ONE TABLET BY MOUTH EVERY NIGHT AT BEDTIME 90 tablet 1     SYRING W-NDL,DISP,INSUL,0.5 ML (INSULIN SYRINGE MISC) Use As Directed.       tamsulosin (FLOMAX) 0.4 mg Cp24 TAKE 1 CAPSULE (0.4 MG TOTAL) BY MOUTH DAILY. 90 capsule 2     UBIDECARENONE (COQ-10 ORAL) Take 100 mg by mouth bedtime.        "triamcinolone (KENALOG) 0.1 % cream Apply topically 2 (two) times a day. For up to 3 weeks 80 g 1     No current facility-administered medications for this visit.      History   Smoking Status     Never Smoker   Smokeless Tobacco     Never Used       OBJECTICE: /66 (Patient Site: Right Arm, Patient Position: Sitting, Cuff Size: Adult Large)  Pulse 72  Temp 98  F (36.7  C) (Oral)   Resp 20  Ht 6' 1\" (1.854 m)  Wt (!) 249 lb (112.9 kg)  BMI 32.85 kg/m2     Recent Results (from the past 24 hour(s))   Glycosylated Hemoglobin A1c    Collection Time: 05/24/17  2:36 PM   Result Value Ref Range    Hemoglobin A1c 8.9 (H) 3.5 - 6.0 %        GEN-alert, appropriate, no apparent distress   HEENT-mild wax on both sides, no occlusion of the ear canals, tympanic membranes look normal on visualized portions   CV-regular rate and rhythm with no murmur   RESP-lungs clear to auscultation   EXTREM-Warm with no edema   SKIN-maculopapular red rash over his lateral right wrist      Mahad Morales          "

## 2021-06-10 NOTE — TELEPHONE ENCOUNTER
"Please see message below.      Spoke to pt who was very upset especially with Dr. Gastelum.  Pt states he is still having right sided back pain and the ONLY med to help was Cymbalta over the last 5 years.  Per pt wife and pt, Dr. Gastelum was upset that pt was taking Cymbalta and \"yelled\" at him that he should not be taking it as it causes bleeding.  Wife stated if pt took one Cymbalta no bleeding, but if took 2 then pt bled.      Pt stated having low back pain as well.  Pt stopped taking Plavix as the script ran out per wife.  Dr. Posey, cardiologist had prescribed med.   Unknown reason why pt or wife did not call in for refills.  Pt stated he was told to figure out himself IF he should take the Plavix.  Pt is also feeling dizzy and like he is falling, but CMT not certain pt would like to come into clinic.       All this to say pt would like the Cymbalta back or is there another med that can help with the back pain but have no side effects of bleeding?   Also does pt need to take Plavix?  Pt and or wife wish to speak directly to PCP.  Can either call before 2 or after 4-5 pm today please.  CMT will queue up both Cymbalta and Plavix, in the event PCP wishes to renew.    "

## 2021-06-11 NOTE — PROGRESS NOTES
"ASSESMENT AND PLAN:  Diagnoses and all orders for this visit:    Chronic midline low back pain without sciatica  Good response to the reinitiation of duloxetine at 30 mg, patient would like to titrate up the dose and we are going to increase to 40 mg which is the dose that he had been on previously.  -     DULoxetine 40 mg CpDR; Take 40 mg by mouth daily.  Dispense: 30 capsule; Refill: 6    Anxiety  -     DULoxetine 40 mg CpDR; Take 40 mg by mouth daily.  Dispense: 30 capsule; Refill: 6    Bipolar Disorder  No signs of exacerbation with the reinitiation of duloxetine.  Counseling done today, will do another phone follow-up visit to watch this closely in about 3 weeks.  Continue quetiapine at bedtime.      Reviewed the risks and benefits of the treatment plan with the patient and/or caregiver and we discussed indications for routine and emergent follow-up.    Telephone visit done in lieu of a clinic visit because of ongoing concerns and restrictions with the COVID-19 pandemic.  Total telephone minutes with patient - 12      Patient is being evaluated via a billable telephone visit.      The patient has been notified of following:     \"This telephone visit will be conducted via a call between you and your physician/provider. We have found that certain health care needs can be provided without the need for a physical exam.  This service lets us provide the care you need with a short phone conversation.  If a prescription is necessary we can send it directly to your pharmacy.  If lab work is needed we can place an order for that and you can then stop by our lab to have the test done at a later time.    Telephone visits are billed at different rates depending on your insurance coverage. During this emergency period, for some insurers they may be billed the same as an in-person visit.  Please reach out to your insurance provider with any questions.    If during the course of the call the physician/provider feels a " "telephone visit is not appropriate, you will not be charged for this service.\"    Patient has given verbal consent to a Telephone visit? Yes        SUBJECTIVE: 70-year-old male has a history of severe bipolar disorder with recent significant problems related to manic bipolar disease.  See previous notes for details.  He also has been suffering with significant chronic back pain.  This had responded well to duloxetine in the past but it had been discontinued because of the concern about antiplatelet side effects and the bleeding that the patient was having.  At that time he was on Plavix.  Since then Plavix has been discontinued, bleeding issues have resolved completely, and patient was restarted on duloxetine at 30 mg daily at last telephone visit.  Since then, he has noticed some improvement in his pain.  His wife has not noticed any worsening of his bipolar disorder.  There has been no recurrence of blood in the stool or any other bleeding.  He is wondering about increasing the dose back up to the 40 mg which had worked well for him in the past.    Past Medical History:   Diagnosis Date     Arthritis      Asthma      Bipolar affective disorder (H)      BPH (benign prostatic hyperplasia) 01/06/2015     CAD (coronary artery disease) 07/20/2015     Diabetes mellitus (H)      Dyslipidemia, goal LDL below 70 07/20/2015     Eczema      Essential hypertension      Tardive dyskinesia      Patient Active Problem List   Diagnosis     Subacute dyskinesia due to drug     Dyslipidemia, goal LDL below 70     Essential hypertension     Type 2 diabetes mellitus (H)     Bipolar Disorder     Enlarged prostate with urinary obstruction     Coronary artery disease     Erectile dysfunction     Gait abnormality     Gastroesophageal reflux disease without esophagitis     Chronic back pain     Neuropathic pain     Primary osteoarthritis involving multiple joints     Current Outpatient Medications   Medication Sig Dispense Refill     " acetaminophen (TYLENOL) 500 MG tablet Take 500 mg by mouth 2 (two) times a day.       aspirin 81 MG EC tablet Take 81 mg by mouth daily.       atenoloL (TENORMIN) 50 MG tablet TAKE ONE TABLET BY MOUTH EVERY NIGHT AT BEDTIME 90 tablet 3     atorvastatin (LIPITOR) 20 MG tablet Take 1 tablet by mouth daily. 90 tablet 3     blood glucose meter (GLUCOMETER) OneTouch Ultra. Dispense glucometer brand per patient's insurance at pharmacy discretion. Pt testing 3 times per day. 1 each 0     cholecalciferol, vitamin D3, 1,000 unit tablet Take 2,000 Units by mouth every other day. Takes at 1200       diclofenac sodium (VOLTAREN) 1 % Gel Apply 4 GRAMS three times a day as needed TO RIGHT KNEE 100 g 4     DULoxetine 40 mg CpDR Take 40 mg by mouth daily. 30 capsule 6     flash glucose scanning reader (FREESTYLE SALUD 14 DAY READER) Misc Use 1 Units As Directed every 8 (eight) hours. 1 each 0     flash glucose sensor (FREESTYLE SALUD 14 DAY SENSOR) Kit Use 1 Units As Directed every 14 (fourteen) days. 6 kit 3     generic lancets Dispense brand per patient's insurance at pharmacy discretion. TESTS 3 times per day 350 each 3     insulin needles, disposable, 31 Ndle 3 (three) times a day. For insulin injections.       insulin NPH-insulin regular (NOVOLIN 70/30 U-100 INSULIN) 100 unit/mL (70-30) injection Inject 85-90 Units under the skin 2 (two) times a day before meals. 50 mL 11     insulin syringe-needle U-100 1 mL 31 gauge x 5/16 Syrg USE AS DIRECTED ONCE DAILY 100 each 2     isosorbide mononitrate (IMDUR) 30 MG 24 hr tablet Take 1 tablet (30 mg total) by mouth daily. 90 tablet 2     lisinopriL (PRINIVIL,ZESTRIL) 40 MG tablet Take 1 tablet (40 mg total) by mouth daily. 90 tablet 3     metFORMIN (GLUCOPHAGE) 1000 MG tablet TAKE ONE TABLET BY MOUTH 2 TIMES A DAY WITH MEALS. 180 tablet 0     nitroglycerin (NITROSTAT) 0.4 MG SL tablet PLACE ONE TABLET UNDER THE TONGUE EVERY 5 MINUTES AS NEEDED FOR CHEST PAIN.  MAXIMUM DOSE OF 3 TABLETS  IN 15 MINUTES. 25 tablet 3     omeprazole (PRILOSEC) 20 MG capsule Take 1 capsule (20 mg total) by mouth daily before breakfast. 90 capsule 3     ONETOUCH ULTRA BLUE TEST STRIP strips TEST 3 times per day. 350 strip 3     QUEtiapine (SEROQUEL) 100 MG tablet Take 1-3 tablets (100-300 mg total) by mouth every morning. Continue to take 400 mg at bedtime (seperate rx) 90 tablet 11     QUEtiapine (SEROQUEL) 400 MG tablet Take 1 tablet (400 mg total) by mouth at bedtime. 30 tablet 11     tamsulosin (FLOMAX) 0.4 mg cap Take 1 capsule (0.4 mg total) by mouth daily. 90 capsule 3     No current facility-administered medications for this visit.      Social History     Tobacco Use   Smoking Status Never Smoker   Smokeless Tobacco Never Used       OBJECTICE: There were no vitals taken for this visit.     No results found for this or any previous visit (from the past 24 hour(s)).       Mahad Morales

## 2021-06-11 NOTE — TELEPHONE ENCOUNTER
Please schedule the patient for a follow-up telephone visit on 9/17/2020 at 11 AM-patient is aware of appointment.      Called and discussed with Rosie.  Patient decided to stop taking the Plavix, this was discontinued from his medication list.  Therefore, because he is no longer on the antiplatelet agent, the patient would be less likely to have the bleeding problems that he had with Cymbalta in the past.  Cymbalta has been the medication that gave him the best response for his chronic pain.  It may also help with his significant anxiety.  The worry would be if it caused exacerbation of his bipolar disorder in the manic phase.  Discussed all this today with the patient's wife will watch things closely, will start low-dose 30 mg daily and follow-up for a phone visit in 2 weeks.

## 2021-06-12 NOTE — PROGRESS NOTES
"ASSESMENT AND PLAN:  Diagnoses and all orders for this visit:    Bipolar Disorder  Stable but not under ideal control.  See previous notes for details.  Patient refuses psychiatric consultation has failed multiple trials of additional medication.  He will continue his current dose of Seroquel which gives him good nighttime control of symptoms.  Phone recheck with the patient again in about a month.    Morbid obesity (H)  Counseling done on a daily indoor exercise program.    Type 2 diabetes mellitus with hyperosmolarity without coma, unspecified whether long term insulin use (H)  Encouraged to come into clinic for follow-up the patient is reluctant as detailed in previous notes.  Encouraged to get flu shot either at the clinic were at the grocery store.    Chronic midline low back pain without sciatica  Stable.  I have encouraged the patient to come in for further evaluation and diagnostics given the concern about lowerextremityweakness.  However, patient has refused, see previous notes for details.  Counseling done on the safety of using acetaminophen at prescribed dose and ibuprofen as previously discussed dose, he is going to try alternating using 1 on Monday and then the other on the next day.      Reviewed the risks and benefits of the treatment plan with the patient and/or caregiver and we discussed indications for routine and emergent follow-up.    Telephone visit done in lieu of a clinic visit because of ongoing concerns and restrictions with the COVID-19 pandemic.  Total telephone minutes with patient - 11.      Patient is being evaluated via a billable telephone visit.      The patient has been notified of following:     \"This telephone visit will be conducted via a call between you and your physician/provider. We have found that certain health care needs can be provided without the need for a physical exam.  This service lets us provide the care you need with a short phone conversation.  If a prescription " "is necessary we can send it directly to your pharmacy.  If lab work is needed we can place an order for that and you can then stop by our lab to have the test done at a later time.    Telephone visits are billed at different rates depending on your insurance coverage. During this emergency period, for some insurers they may be billed the same as an in-person visit.  Please reach out to your insurance provider with any questions.    If during the course of the call the physician/provider feels a telephone visit is not appropriate, you will not be charged for this service.\"    Patient has given verbal consent to a Telephone visit? Yes        SUBJECTIVE: Things have been stable for the patient but he continues to sleep well at night but have intermittent panic symptoms during the daytime.  His back pain has been about the same.  He did get good improvement in his back pain with acetaminophen and ibuprofen but stopped taking because of worries affect.  Patient continues to have back pain and lower extremity weakness which bother him more if he stands for prolonged period of time.    Past Medical History:   Diagnosis Date     Arthritis      Asthma      Bipolar affective disorder (H)      BPH (benign prostatic hyperplasia) 01/06/2015     CAD (coronary artery disease) 07/20/2015     Diabetes mellitus (H)      Dyslipidemia, goal LDL below 70 07/20/2015     Eczema      Essential hypertension      Tardive dyskinesia      Patient Active Problem List   Diagnosis     Subacute dyskinesia due to drug     Dyslipidemia, goal LDL below 70     Essential hypertension     Type 2 diabetes mellitus (H)     Bipolar Disorder     Enlarged prostate with urinary obstruction     Coronary artery disease     Erectile dysfunction     Gait abnormality     Gastroesophageal reflux disease without esophagitis     Chronic back pain     Neuropathic pain     Primary osteoarthritis involving multiple joints     Obesity (BMI 35.0-39.9) with comorbidity (H) "     Current Outpatient Medications   Medication Sig Dispense Refill     acetaminophen (TYLENOL) 500 MG tablet Take 500 mg by mouth 2 (two) times a day.       aspirin 81 MG EC tablet Take 81 mg by mouth daily.       atenoloL (TENORMIN) 50 MG tablet TAKE ONE TABLET BY MOUTH EVERY NIGHT AT BEDTIME 90 tablet 3     atorvastatin (LIPITOR) 20 MG tablet Take 1 tablet by mouth daily. 90 tablet 3     blood glucose meter (GLUCOMETER) OneTouch Ultra. Dispense glucometer brand per patient's insurance at pharmacy discretion. Pt testing 3 times per day. 1 each 0     cholecalciferol, vitamin D3, 1,000 unit tablet Take 2,000 Units by mouth every other day. Takes at 1200       diclofenac sodium (VOLTAREN) 1 % Gel Apply 4 GRAMS three times a day as needed TO RIGHT KNEE 100 g 4     DULoxetine 40 mg CpDR Take 40 mg by mouth daily. 30 capsule 6     flash glucose scanning reader (FREESTYLE SALUD 14 DAY READER) Misc Use 1 Units As Directed every 8 (eight) hours. 1 each 0     flash glucose sensor (FREESTYLE SALUD 14 DAY SENSOR) Kit Use 1 Units As Directed every 14 (fourteen) days. 6 kit 3     generic lancets Dispense brand per patient's insurance at pharmacy discretion. TESTS 3 times per day 350 each 3     insulin needles, disposable, 31 Ndle 3 (three) times a day. For insulin injections.       insulin NPH-insulin regular (NOVOLIN 70/30 U-100 INSULIN) 100 unit/mL (70-30) injection Inject 85-90 Units under the skin 2 (two) times a day before meals. 50 mL 11     insulin syringe-needle U-100 1 mL 31 gauge x 5/16 Syrg USE AS DIRECTED ONCE DAILY 100 each 2     isosorbide mononitrate (IMDUR) 30 MG 24 hr tablet Take 1 tablet (30 mg total) by mouth daily. 90 tablet 2     lisinopriL (PRINIVIL,ZESTRIL) 40 MG tablet Take 1 tablet (40 mg total) by mouth daily. 90 tablet 3     metFORMIN (GLUCOPHAGE) 1000 MG tablet TAKE ONE TABLET BY MOUTH 2 TIMES A DAY WITH MEALS. 180 tablet 0     nitroglycerin (NITROSTAT) 0.4 MG SL tablet PLACE ONE TABLET UNDER THE  TONGUE EVERY 5 MINUTES AS NEEDED FOR CHEST PAIN.  MAXIMUM DOSE OF 3 TABLETS IN 15 MINUTES. 25 tablet 3     omeprazole (PRILOSEC) 20 MG capsule Take 1 capsule (20 mg total) by mouth daily before breakfast. 90 capsule 3     ONETOUCH ULTRA BLUE TEST STRIP strips TEST 3 times per day. 350 strip 3     QUEtiapine (SEROQUEL) 100 MG tablet Take 1-3 tablets (100-300 mg total) by mouth every morning. Continue to take 400 mg at bedtime (seperate rx) 90 tablet 11     QUEtiapine (SEROQUEL) 400 MG tablet Take 1 tablet (400 mg total) by mouth at bedtime. 30 tablet 11     tamsulosin (FLOMAX) 0.4 mg cap Take 1 capsule (0.4 mg total) by mouth daily. 90 capsule 3     No current facility-administered medications for this visit.      Social History     Tobacco Use   Smoking Status Never Smoker   Smokeless Tobacco Never Used       OBJECTICE: There were no vitals taken for this visit.     No results found for this or any previous visit (from the past 24 hour(s)).      Mahad Morales

## 2021-06-12 NOTE — PROGRESS NOTES
Mahad Orellana is a 67 y.o. male who presents for an annual exam. The patient reports that there is not domestic violence in his life.      ASSESMENT AND PLAN:    Physical, Health Maitenence -   Reviewed healthy lifestyle, diet, exercise, vitamins, and follow-up plan today with patient.  Reviewed age appropriate cancer and other screening recommendations.  Immunization review and update done.  Reviewed indicated lab tests, see lab orders.   -     Influenza High Dose, Seasonal 65+ yrs    DM (diabetes mellitus)  -     metFORMIN (GLUCOPHAGE) 1000 MG tablet; Take 1 tablet (1,000 mg total) by mouth 2 (two) times a day with meals.  Dispense: 180 tablet; Refill: 3  -     Glycosylated Hemoglobin A1c    Bipolar Disorder  Patient has had some mild recent exacerbation as described below, he self titrated his Haldol up to 4 mg at bedtime.  He would like to continue with this dose and I prescribed as below after a discussion of the risks and benefits with the focus on the extrapyramidal side effects.  -     haloperidol (HALDOL) 2 MG tablet; Take 2 tablets (4 mg total) by mouth at bedtime. TAKE 2 TABLET (2 MG TOTAL) BY MOUTH BEDTIME.  Dispense: 180 tablet; Refill: 3    Probable right shoulder rotator cuff tendinopathy  Recommended formal physical therapy and anti-inflammatories.  However, the patient is reluctant to proceed with either.  He is concerned about the potential effect of anti-inflammatories on his cardiac status and medications.  He went through physical therapy in the past and did not find it helpful for other problems and is reluctant to try it again for his shoulder.  Therefore, going to use acetaminophen 3-4 times per day, I demonstrated for him home pendulum exercises I would like him to do with his shoulder, and we reviewed indications for follow-up.      HPI: 67-year-old male here for his physical.  Overall he has been doing well but did have some recent exacerbation in his manic symptoms disrupting his sleep  over the last couple of months.  The patient self titrated his Haldol dose from 2 mg at bedtime up to 4 mg at bedtime and had complete resolution of the symptoms.  He has not noticed any worsening of his tremor with the dose change.  Patient has a concern about right shoulder pain.  It has been going on for several months and is not improving.  It started after a fall, he had a negative x-ray at that time.  He now gets pain when he tries to reach around behind his back or when he tries to elevate his arm above shoulder level.  Pain can be moderate to severe at times with those types of movements but at rest is minimal.    ROS: No chest pain, no shortness of breath, no blood in the urine, no blood in the stool, no skin lesions that have been changing.  Remainder of review of systems is as above are negative.    Past Medical History:   Diagnosis Date     Arthritis      Asthma      Bipolar affective disorder      BPH (benign prostatic hyperplasia) 01/06/2015     CAD (coronary artery disease) 07/20/2015     Diabetes mellitus      Dyslipidemia, goal LDL below 70 07/20/2015     Eczema      Essential hypertension      Tardive dyskinesia        Current Outpatient Prescriptions   Medication Sig Dispense Refill     acetaminophen (TYLENOL) 500 MG tablet Take 500 mg by mouth 2 (two) times a day.       albuterol (PROAIR HFA;PROVENTIL HFA;VENTOLIN HFA) 90 mcg/actuation inhaler Inhale 1-2 puffs every 4 (four) hours as needed for wheezing. 1 each 1     ascorbic acid, vitamin C, (VITAMIN C) 125 mg Chew Chew 3 tablets daily.       aspirin 81 MG EC tablet Take 81 mg by mouth daily.       atenolol (TENORMIN) 50 MG tablet TAKE 1 TABLET (50 MG TOTAL) BY MOUTH BEDTIME. 90 tablet 3     b complex vitamins tablet Take 1 tablet by mouth daily with lunch.        cholecalciferol, vitamin D3, 1,000 unit tablet Take 2,000 Units by mouth every other day. Takes at 1200       clopidogrel (PLAVIX) 75 mg tablet Take 1 tablet (75 mg total) by mouth  daily. 90 tablet 4     CONTOUR NEXT STRIPS strips TEST BLOOD SUGAR UP TO FOUR TIMES A DAY AND RECORD RESULTS. 350 each 1     diphenhydrAMINE (BENADRYL) 50 MG capsule TAKE TWO CAPSULES BY MOUTH NIGHTLY AT BEDTIME AS NEEDED 180 capsule 3     DULoxetine (CYMBALTA) 60 MG capsule TAKE 1 CAPSULE (60 MG TOTAL)  BY MOUTH DAILY WITH LUNCH. 90 capsule 3     haloperidol (HALDOL) 2 MG tablet Take 2 tablets (4 mg total) by mouth at bedtime. TAKE 2 TABLET (2 MG TOTAL) BY MOUTH BEDTIME. 180 tablet 3     insulin needles, disposable, 31 Ndle 3 (three) times a day. For insulin injections.       insulin regular (HUMULIN/NOVOLIN) 100 unit/mL injection Inject under the skin. 22 units at am and 32 units in the evening       insulin syringe-needle U-100 1 mL 31 gauge x 5/16 Syrg USE AS DIRECTED ONCE DAILY 100 each 2     INULIN (FIBER GUMMIES ORAL) Take by mouth bedtime. Takes 2 gummies at night       isosorbide mononitrate (IMDUR) 30 MG 24 hr tablet Take 1 tablet (30 mg total) by mouth daily. 90 tablet 3     isosorbide mononitrate (IMDUR) 30 MG 24 hr tablet TAKE 1 TABLET (30 MG TOTAL) BY MOUTH DAILY. 90 tablet 1     LACTOBACILLUS ACIDOPHILUS (PROBIOTIC ORAL) Take 2 tablets by mouth daily with lunch.        lisinopril (PRINIVIL,ZESTRIL) 20 MG tablet Take 2 tablets (40 mg total) by mouth daily with lunch. 180 tablet 3     lisinopril (PRINIVIL,ZESTRIL) 20 MG tablet TAKE 2 TABLETS (40 MG TOTAL)  BY MOUTH DAILY WITH LUNCH. 180 tablet 2     magnesium 250 mg Tab Take 1 tablet by mouth every other day. Takes at 1200       metFORMIN (GLUCOPHAGE) 1000 MG tablet Take 1 tablet (1,000 mg total) by mouth 2 (two) times a day with meals. 180 tablet 3     MULTIVITAMIN (MULTIPLE VITAMIN ORAL) Take 1 tablet by mouth every other day. Takes at 1200       NEEDLES, INSULIN DISPOSABLE (BD ULTRA-FINE ALISHA PEN NEEDLES MISC) Use four times a day.       nitroglycerin (NITROSTAT) 0.4 MG SL tablet DISSOLVE 1 TAB. UNDER TONGUE AS NEED EVERY 5 MIN. & CALL 911 IF NO  RELEIF. CONTINUE 1 TAB./5 MIN. UNTIL HELP COMES 25 tablet 2     NOVOLIN N 100 unit/mL injection Inject 31 Units under the skin 2 (two) times a day before meals. (Patient taking differently: Inject under the skin 2 (two) times a day before meals. 31 units at AM and 31 units at HS) 10 mL 6     omeprazole (PRILOSEC) 20 MG capsule Take 1 capsule (20 mg total) by mouth daily. 90 capsule 3     simvastatin (ZOCOR) 40 MG tablet TAKE  ONE TABLET BY MOUTH EVERY NIGHT AT BEDTIME 90 tablet 1     SYRING W-NDL,DISP,INSUL,0.5 ML (INSULIN SYRINGE MISC) Use As Directed.       tamsulosin (FLOMAX) 0.4 mg Cp24 TAKE 1 CAPSULE (0.4 MG TOTAL) BY MOUTH DAILY. 90 capsule 2     triamcinolone (KENALOG) 0.1 % cream Apply topically 2 (two) times a day. For up to 3 weeks 80 g 1     UBIDECARENONE (COQ-10 ORAL) Take 100 mg by mouth bedtime.       No current facility-administered medications for this visit.        Patient Active Problem List   Diagnosis     Drug-induced Tardive Dyskinesia     Dyslipidemia, goal LDL below 70     Essential hypertension     Type 2 diabetes mellitus     Bipolar Disorder     Enlarged prostate with urinary obstruction     Coronary artery disease     Erectile dysfunction     Gastroesophageal reflux disease without esophagitis       Social History     Social History     Marital status:      Spouse name: Rosie     Number of children: 3     Years of education: N/A     Occupational History      Retired     Social History Main Topics     Smoking status: Never Smoker     Smokeless tobacco: Never Used     Alcohol use No     Drug use: No     Sexual activity: Not Asked     Other Topics Concern     None     Social History Narrative     Healthy Habits:   Regular Exercise: Yes Walk  Sunscreen Use: No  Healthy Diet: Yes  Dental Visits Regularly: Yes  Seat Belt: Yes  Sexually active: N/A  Monthly Self Testicular Exams:  Yes  Hemoccults: No  Flex Sig: No  Colonoscopy: No  Lipid Profile: Yes  Glucose Screen: Yes  Prevention of  "Osteoporosis: No  Last Dexa: No  Guns at Home:  No    History   Smoking Status     Never Smoker   Smokeless Tobacco     Never Used       OBJECTICE: /66 (Patient Site: Left Arm, Patient Position: Sitting, Cuff Size: Adult Large)  Pulse 72  Temp 98.4  F (36.9  C) (Oral)   Resp 20  Ht 6' 0.75\" (1.848 m)  Wt (!) 250 lb 8 oz (113.6 kg)  BMI 33.28 kg/m2      Gen - alert, orientated, NAD  Eyes - fundascopic exam limited by the undialated pupil but looks symmetric  ENT - oropharynx clear, TMs clear  Neck - supple, no palpable mass or lymphadenopathy  CV - RRR, no murmur  Resp - lungs CTA  Ab - soft, nontender, no palpable mass or organomegaly   - declined  Extrem - warm, no edema  Neuro - CN II-XII intact, strength, sensation, reflexes intact and symmetric  Skin - no rash, no atypical appearing lesions seen.       Mahad Morales   5:19 PM 9/13/2017               "

## 2021-06-12 NOTE — TELEPHONE ENCOUNTER
RN cannot approve Refill Request    RN can NOT refill this medication Protocol failed and NO refill given. Last office visit: 2/5/2020 Mahad Morales MD Last Physical: 9/13/2017 Last MTM visit: Visit date not found Last visit same specialty: 5/19/2020 Elva Kong MD.  Next visit within 3 mo: Visit date not found  Next physical within 3 mo: Visit date not found      Bella Hirsch, [facility] Triage/Med Refill 11/2/2020    Requested Prescriptions   Pending Prescriptions Disp Refills     metFORMIN (GLUCOPHAGE) 1000 MG tablet [Pharmacy Med Name: metFORMIN HCl Oral Tablet 1000 MG] 180 tablet 0     Sig: TAKE ONE TABLET BY MOUTH 2 TIMES A DAY WITH MEALS.       Metformin Refill Protocol Failed - 10/30/2020  2:01 AM        Failed - LFT or AST or ALT in last 12 months     Albumin   Date Value Ref Range Status   04/25/2018 4.1 3.5 - 5.0 g/dL Final     Bilirubin, Total   Date Value Ref Range Status   04/25/2018 0.8 0.0 - 1.0 mg/dL Final     Bilirubin, Direct   Date Value Ref Range Status   12/12/2014 0.4 <=0.5 mg/dL Final     Alkaline Phosphatase   Date Value Ref Range Status   04/25/2018 148 (H) 45 - 120 U/L Final     AST   Date Value Ref Range Status   04/25/2018 29 0 - 40 U/L Final     ALT   Date Value Ref Range Status   04/25/2018 45 0 - 45 U/L Final     Protein, Total   Date Value Ref Range Status   04/25/2018 7.1 6.0 - 8.0 g/dL Final                Failed - A1C in last 6 months     Hemoglobin A1c   Date Value Ref Range Status   12/31/2019 9.6 (H) 3.5 - 6.0 % Final               Failed - Microalbumin in last year      Microalbumin, Random Urine   Date Value Ref Range Status   07/16/2019 1.10 0.00 - 1.99 mg/dL Final                  Passed - Blood pressure in last 12 months     BP Readings from Last 1 Encounters:   05/19/20 122/68             Passed - GFR or Serum Creatinine in last 6 months     GFR MDRD Non Af Amer   Date Value Ref Range Status   12/31/2019 >60 >60 mL/min/1.73m2 Final     GFR MDRD Af Amer   Date  Value Ref Range Status   12/31/2019 >60 >60 mL/min/1.73m2 Final             Passed - Visit with PCP or prescribing provider visit in last 6 months or next 3 months     Last office visit with prescriber/PCP: Visit date not found OR same dept: 5/19/2020 Elva Kong MD OR same specialty: 5/19/2020 Elva Kong MD Last physical: Visit date not found Last MTM visit: Visit date not found         Next appt within 3 mo: Visit date not found  Next physical within 3 mo: Visit date not found  Prescriber OR PCP: Mahad Morales MD  Last diagnosis associated with med order: 1. DM (diabetes mellitus) (H)  - metFORMIN (GLUCOPHAGE) 1000 MG tablet [Pharmacy Med Name: metFORMIN HCl Oral Tablet 1000 MG]; TAKE ONE TABLET BY MOUTH 2 TIMES A DAY WITH MEALS.  Dispense: 180 tablet; Refill: 0     If protocol passes may refill for 12 months if within 3 months of last provider visit (or a total of 15 months).

## 2021-06-12 NOTE — PROGRESS NOTES
ASSESMENT AND PLAN:  Diagnoses and all orders for this visit:    Bipolar Disorder  Stable.  Not under ideal control but the patient has rejected most medications as detailed in previous notes.  He is having good nighttime control of his symptoms with his quetiapine at bedtime.  He has not had exacerbation of his manic symptoms with the duloxetine used for pain but also does not have ideal control of his manic symptoms.  Patient is not interested in psychiatry care.  He refuses even follow-up with me here in the clinic as detailed below.  We will continue his current treatment plan and recheck again over the telephone in 2 to 3 weeks.    Chronic midline low back pain without sciatica  The weakness that has developed is concerning.  Counseled the patient and his wife that the symptoms deserve a full evaluation here in the clinic along with possible imaging of his low back.  At this point patient is refusing to come to the clinic.  He is focused more on the pain control and had some improvement in his pain control with duloxetine which is now at 40 mg/day, which will be continued.  We will add ibuprofen 400 mg twice daily with food and follow-up again in 2 to 3 weeks for recheck.  Encouraged him to follow-up in the clinic for an in person visit, declined at this time, if he reconsiders we will schedule him here in the clinic.  If symptoms continue to progress will talk with him further about imaging and see whether he read agreed to go into the hospital for an MRI even without being seen previously.      Reviewed the risks and benefits of the treatment plan with the patient and/or caregiver and we discussed indications for routine and emergent follow-up.    Telephone visit done in lieu of a clinic visit because of ongoing concerns and restrictions with the COVID-19 pandemic.  Total telephone minutes with patient - 13.      Patient is being evaluated via a billable telephone visit.      The patient has been notified of  "following:     \"This telephone visit will be conducted via a call between you and your physician/provider. We have found that certain health care needs can be provided without the need for a physical exam.  This service lets us provide the care you need with a short phone conversation.  If a prescription is necessary we can send it directly to your pharmacy.  If lab work is needed we can place an order for that and you can then stop by our lab to have the test done at a later time.    Telephone visits are billed at different rates depending on your insurance coverage. During this emergency period, for some insurers they may be billed the same as an in-person visit.  Please reach out to your insurance provider with any questions.    If during the course of the call the physician/provider feels a telephone visit is not appropriate, you will not be charged for this service.\"    Patient has given verbal consent to a Telephone visit? Yes          SUBJECTIVE: Patient had initially been scheduled for an in clinic appointment but the patient and wife.  Patient has been refusing to come into clinic.  Part of this is because of concerns about COVID-19, but part of it is also because of his was under the impression that it was a telephone follow-up uncontrolled bipolar disorder and manic symptoms and paranoid thinking.  His wife reports that although he has had some increased control of his low back pain, he has been noticing some slowly worsening weakness in his legs bilaterally.  His wife reports that sometimes after just a few minutes of standing or walking he has to sit down to rest because his legs feel weak.    Past Medical History:   Diagnosis Date     Arthritis      Asthma      Bipolar affective disorder (H)      BPH (benign prostatic hyperplasia) 01/06/2015     CAD (coronary artery disease) 07/20/2015     Diabetes mellitus (H)      Dyslipidemia, goal LDL below 70 07/20/2015     Eczema      Essential hypertension      " Tardive dyskinesia      Patient Active Problem List   Diagnosis     Subacute dyskinesia due to drug     Dyslipidemia, goal LDL below 70     Essential hypertension     Type 2 diabetes mellitus (H)     Bipolar Disorder     Enlarged prostate with urinary obstruction     Coronary artery disease     Erectile dysfunction     Gait abnormality     Gastroesophageal reflux disease without esophagitis     Chronic back pain     Neuropathic pain     Primary osteoarthritis involving multiple joints     Current Outpatient Medications   Medication Sig Dispense Refill     acetaminophen (TYLENOL) 500 MG tablet Take 500 mg by mouth 2 (two) times a day.       aspirin 81 MG EC tablet Take 81 mg by mouth daily.       atenoloL (TENORMIN) 50 MG tablet TAKE ONE TABLET BY MOUTH EVERY NIGHT AT BEDTIME 90 tablet 3     atorvastatin (LIPITOR) 20 MG tablet Take 1 tablet by mouth daily. 90 tablet 3     blood glucose meter (GLUCOMETER) OneTouch Ultra. Dispense glucometer brand per patient's insurance at pharmacy discretion. Pt testing 3 times per day. 1 each 0     cholecalciferol, vitamin D3, 1,000 unit tablet Take 2,000 Units by mouth every other day. Takes at 1200       diclofenac sodium (VOLTAREN) 1 % Gel Apply 4 GRAMS three times a day as needed TO RIGHT KNEE 100 g 4     DULoxetine 40 mg CpDR Take 40 mg by mouth daily. 30 capsule 6     flash glucose scanning reader (FREESTYLE SALUD 14 DAY READER) Misc Use 1 Units As Directed every 8 (eight) hours. 1 each 0     flash glucose sensor (FREESTYLE SALUD 14 DAY SENSOR) Kit Use 1 Units As Directed every 14 (fourteen) days. 6 kit 3     generic lancets Dispense brand per patient's insurance at pharmacy discretion. TESTS 3 times per day 350 each 3     insulin needles, disposable, 31 Ndle 3 (three) times a day. For insulin injections.       insulin NPH-insulin regular (NOVOLIN 70/30 U-100 INSULIN) 100 unit/mL (70-30) injection Inject 85-90 Units under the skin 2 (two) times a day before meals. 50 mL 11      insulin syringe-needle U-100 1 mL 31 gauge x 5/16 Syrg USE AS DIRECTED ONCE DAILY 100 each 2     isosorbide mononitrate (IMDUR) 30 MG 24 hr tablet Take 1 tablet (30 mg total) by mouth daily. 90 tablet 2     lisinopriL (PRINIVIL,ZESTRIL) 40 MG tablet Take 1 tablet (40 mg total) by mouth daily. 90 tablet 3     metFORMIN (GLUCOPHAGE) 1000 MG tablet TAKE ONE TABLET BY MOUTH 2 TIMES A DAY WITH MEALS. 180 tablet 0     nitroglycerin (NITROSTAT) 0.4 MG SL tablet PLACE ONE TABLET UNDER THE TONGUE EVERY 5 MINUTES AS NEEDED FOR CHEST PAIN.  MAXIMUM DOSE OF 3 TABLETS IN 15 MINUTES. 25 tablet 3     omeprazole (PRILOSEC) 20 MG capsule Take 1 capsule (20 mg total) by mouth daily before breakfast. 90 capsule 3     ONETOUCH ULTRA BLUE TEST STRIP strips TEST 3 times per day. 350 strip 3     QUEtiapine (SEROQUEL) 100 MG tablet Take 1-3 tablets (100-300 mg total) by mouth every morning. Continue to take 400 mg at bedtime (seperate rx) 90 tablet 11     QUEtiapine (SEROQUEL) 400 MG tablet Take 1 tablet (400 mg total) by mouth at bedtime. 30 tablet 11     tamsulosin (FLOMAX) 0.4 mg cap Take 1 capsule (0.4 mg total) by mouth daily. 90 capsule 3     No current facility-administered medications for this visit.      Social History     Tobacco Use   Smoking Status Never Smoker   Smokeless Tobacco Never Used       OBJECTICE: There were no vitals taken for this visit.     No results found for this or any previous visit (from the past 24 hour(s)).     Mahad Morales

## 2021-06-13 NOTE — PROGRESS NOTES
ASSESMENT AND PLAN:  Diagnoses and all orders for this visit:    Acute bilateral low back pain without sciatica  Patient has responded well to tramadol in the past for exacerbations.  Reviewed the risks and benefits of the medication and reviewed indications for routine and emergent follow-up.  -     traMADol (ULTRAM) 50 mg tablet; Take 1 tablet (50 mg total) by mouth every 6 (six) hours as needed for pain.  Dispense: 40 tablet; Refill: 0          SUBJECTIVE: 67-year-old male who has a history of intermittent back pain.  Please see previous notes for details.  In the past, he had gotten very good control of his back pain with Cymbalta.  However, over the last few weeks he has had increasing episodes of more severe pain not controlled with acetaminophen despite continuing to take his Cymbalta daily.  Pain is across the low back and does not radiate down the legs.  He cannot think of any specific injury.  It has started to become disruptive to his daily activities and his sleep.  He would like to try tramadol again because this worked very well for similar symptoms in the past with past exacerbation.  No bowel or bladder dysfunction and no new focal numbness or weakness since onset of his pain.  No fevers, no gross hematuria.    Past Medical History:   Diagnosis Date     Arthritis      Asthma      Bipolar affective disorder      BPH (benign prostatic hyperplasia) 01/06/2015     CAD (coronary artery disease) 07/20/2015     Diabetes mellitus      Dyslipidemia, goal LDL below 70 07/20/2015     Eczema      Essential hypertension      Tardive dyskinesia      Patient Active Problem List   Diagnosis     Drug-induced Tardive Dyskinesia     Dyslipidemia, goal LDL below 70     Essential hypertension     Type 2 diabetes mellitus     Bipolar Disorder     Enlarged prostate with urinary obstruction     Coronary artery disease     Erectile dysfunction     Gastroesophageal reflux disease without esophagitis     No current  facility-administered medications for this visit.      Current Outpatient Prescriptions   Medication Sig Dispense Refill     acetaminophen (TYLENOL) 500 MG tablet Take 500 mg by mouth 2 (two) times a day.       albuterol (PROAIR HFA;PROVENTIL HFA;VENTOLIN HFA) 90 mcg/actuation inhaler Inhale 1-2 puffs every 4 (four) hours as needed for wheezing. 1 each 1     ascorbic acid, vitamin C, (VITAMIN C) 125 mg Chew Chew 3 tablets daily.       aspirin 81 MG EC tablet Take 81 mg by mouth daily.       b complex vitamins tablet Take 1 tablet by mouth daily with lunch.        cholecalciferol, vitamin D3, 1,000 unit tablet Take 2,000 Units by mouth every other day. Takes at 1200       clopidogrel (PLAVIX) 75 mg tablet Take 1 tablet (75 mg total) by mouth daily. 90 tablet 4     CONTOUR NEXT STRIPS strips TEST BLOOD SUGAR UP TO FOUR TIMES A DAY AND RECORD RESULTS. 350 each 1     diphenhydrAMINE (BENADRYL) 50 MG capsule TAKE TWO CAPSULES BY MOUTH NIGHTLY AT BEDTIME AS NEEDED 180 capsule 3     DULoxetine (CYMBALTA) 60 MG capsule TAKE 1 CAPSULE (60 MG TOTAL)  BY MOUTH DAILY WITH LUNCH. 90 capsule 3     haloperidol (HALDOL) 2 MG tablet Take 2 tablets (4 mg total) by mouth at bedtime. TAKE 2 TABLET (2 MG TOTAL) BY MOUTH BEDTIME. 180 tablet 3     insulin needles, disposable, 31 Ndle 3 (three) times a day. For insulin injections.       insulin regular (HUMULIN/NOVOLIN) 100 unit/mL injection Inject under the skin. 22 units at am and 32 units in the evening       insulin syringe-needle U-100 1 mL 31 gauge x 5/16 Syrg USE AS DIRECTED ONCE DAILY 100 each 2     INULIN (FIBER GUMMIES ORAL) Take by mouth bedtime. Takes 2 gummies at night       isosorbide mononitrate (IMDUR) 30 MG 24 hr tablet Take 1 tablet (30 mg total) by mouth daily. 90 tablet 3     isosorbide mononitrate (IMDUR) 30 MG 24 hr tablet TAKE 1 TABLET (30 MG TOTAL) BY MOUTH DAILY. 90 tablet 1     LACTOBACILLUS ACIDOPHILUS (PROBIOTIC ORAL) Take 2 tablets by mouth daily with lunch.         lisinopril (PRINIVIL,ZESTRIL) 20 MG tablet Take 2 tablets (40 mg total) by mouth daily with lunch. 180 tablet 3     lisinopril (PRINIVIL,ZESTRIL) 20 MG tablet TAKE 2 TABLETS (40 MG TOTAL)  BY MOUTH DAILY WITH LUNCH. 180 tablet 2     magnesium 250 mg Tab Take 1 tablet by mouth every other day. Takes at 1200       metFORMIN (GLUCOPHAGE) 1000 MG tablet Take 1 tablet (1,000 mg total) by mouth 2 (two) times a day with meals. 180 tablet 3     metoprolol succinate (TOPROL XL) 100 MG 24 hr tablet Take 1 tablet (100 mg total) by mouth daily. 90 tablet 3     MULTIVITAMIN (MULTIPLE VITAMIN ORAL) Take 1 tablet by mouth every other day. Takes at 1200       NEEDLES, INSULIN DISPOSABLE (BD ULTRA-FINE ALISHA PEN NEEDLES MISC) Use four times a day.       nitroglycerin (NITROSTAT) 0.4 MG SL tablet DISSOLVE 1 TAB. UNDER TONGUE AS NEED EVERY 5 MIN. & CALL 911 IF NO RELEIF. CONTINUE 1 TAB./5 MIN. UNTIL HELP COMES 25 tablet 2     NOVOLIN N 100 unit/mL injection Inject 31 Units under the skin 2 (two) times a day before meals. (Patient taking differently: Inject under the skin 2 (two) times a day before meals. 31 units at AM and 31 units at HS) 10 mL 6     omeprazole (PRILOSEC) 20 MG capsule Take 1 capsule (20 mg total) by mouth daily. 90 capsule 3     simvastatin (ZOCOR) 40 MG tablet TAKE ONE TABLET BY MOUTH EVERY NIGHT AT BEDTIME 90 tablet 2     SYRING W-NDL,DISP,INSUL,0.5 ML (INSULIN SYRINGE MISC) Use As Directed.       tamsulosin (FLOMAX) 0.4 mg Cp24 TAKE ONE CAPSULE BY MOUTH ONCE DAILY 90 capsule 1     triamcinolone (KENALOG) 0.1 % cream Apply topically 2 (two) times a day. For up to 3 weeks 80 g 1     UBIDECARENONE (COQ-10 ORAL) Take 100 mg by mouth bedtime.       traMADol (ULTRAM) 50 mg tablet Take 1 tablet (50 mg total) by mouth every 6 (six) hours as needed for pain. 40 tablet 0     Facility-Administered Medications Ordered in Other Visits   Medication Dose Route Frequency Provider Last Rate Last Dose     cyclobenzaprine tablet  "10 mg (FLEXERIL)  10 mg Oral Once Sri Isaacs CNP         ketorolac injection 30 mg (TORADOL)  30 mg Intramuscular Once Sri Isaacs CNP         History   Smoking Status     Never Smoker   Smokeless Tobacco     Never Used       OBJECTICE: /66 (Patient Site: Right Arm, Patient Position: Sitting, Cuff Size: Adult Large)  Pulse 64  Temp 98.3  F (36.8  C) (Oral)   Resp 20  Ht 6' 0.75\" (1.848 m)  Wt (!) 255 lb (115.7 kg)  BMI 33.87 kg/m2     No results found for this or any previous visit (from the past 24 hour(s)).     GEN-alert, appropriate, in no apparent distress   CV-regular rate and rhythm   RESP-lungs clear to auscultation   Neurologic-strength and sensation are intact and symmetric in the lower extremities bilaterally.   SKIN-no vesicles or other rash overlying his area of pain   Musculoskeletal -no midline spinal point tenderness to palpation of the lumbar spine.      Mahad Morales          "

## 2021-06-13 NOTE — TELEPHONE ENCOUNTER
RN cannot approve Refill Request    RN can NOT refill this medication PCP messaged that patient is overdue for Labs. Last office visit: 2/5/2020 Mahad Morales MD Last Physical: 9/13/2017 Last MTM visit: Visit date not found Last visit same specialty: 5/19/2020 Elva Kong MD.  Next visit within 3 mo: Visit date not found  Next physical within 3 mo: Visit date not found      Kristy York, [facility] Triage/Med Refill 11/14/2020    Requested Prescriptions   Pending Prescriptions Disp Refills     FREESTYLE THOMAS 14 DAY SENSOR Kit [Pharmacy Med Name: FreeStyle Thomas 14 Day Sensor Miscellaneous]  0     Sig: Use 1 Units As Directed every 14 (fourteen) days.       Diabetic Supplies Refill Protocol Failed - 11/14/2020  2:00 AM        Failed - A1C in last 6 months     Hemoglobin A1c   Date Value Ref Range Status   12/31/2019 9.6 (H) 3.5 - 6.0 % Final               Passed - Visit with PCP or prescribing provider visit in last 6 months     Last office visit with prescriber/PCP: 2/5/2020 Mahad Morales MD OR same dept: 5/19/2020 Elva Kong MD OR same specialty: 5/19/2020 Elva Kong MD  Last physical: 9/13/2017 Last MTM visit: Visit date not found   Next visit within 3 mo: Visit date not found  Next physical within 3 mo: Visit date not found  Prescriber OR PCP: Mahad Morales MD  Last diagnosis associated with med order: 1. Type 2 diabetes mellitus with hyperglycemia, with long-term current use of insulin (H)  - FREESTYLE THOMAS 14 DAY SENSOR Kit [Pharmacy Med Name: FreeStyle Thomas 14 Day Sensor Miscellaneous]; Use 1 Units As Directed every 14 (fourteen) days.; Refill: 0    If protocol passes may refill for 12 months if within 3 months of last provider visit (or a total of 15 months).

## 2021-06-13 NOTE — PROGRESS NOTES
"VIVIENNE Orellana is a 67 y.o. male here for ER follow up for acute-on-chronic back pain. He has pain in his mid back which has been present for 'years,\" but has been getting worse in the last 3-4 weeks. No injury or specific event that occurred- just  Has gradually worsened. Sometimes, it radiates around to his abdomen. Does not radiate to legs. He doesn't know why it's getting worse. Wife Rosie says that it used to be well-controlled on a regimen of Cymbalta 60 mg/day along with 1000 mg tylenol in AM and 1000 mg in evening. But now, that's not enough. He finds it hard to move around, walk, take a shower, etc. Because of the pain. No weakness that he has noticed.     Did physical therapy in the past but thought it made the pain worse.     Dr. Morales tried a short course of tramadol but that didn't help.     In the ER, they gave him a shot of toradol and some flexeril, neither of which helped. Flexeril just made him tired.     Can't take NSAIDs because he is on Plavix for stents, the most recent of which were placed in 12/2016.       Past Medical History:   Diagnosis Date     Arthritis      Asthma      Bipolar affective disorder      BPH (benign prostatic hyperplasia) 01/06/2015     CAD (coronary artery disease) 07/20/2015     Diabetes mellitus      Dyslipidemia, goal LDL below 70 07/20/2015     Eczema      Essential hypertension      Tardive dyskinesia    Reviewed.  Current Outpatient Prescriptions on File Prior to Visit   Medication Sig Dispense Refill     acetaminophen (TYLENOL) 500 MG tablet Take 500 mg by mouth 2 (two) times a day.       albuterol (PROAIR HFA;PROVENTIL HFA;VENTOLIN HFA) 90 mcg/actuation inhaler Inhale 1-2 puffs every 4 (four) hours as needed for wheezing. 1 each 1     ascorbic acid, vitamin C, (VITAMIN C) 125 mg Chew Chew 3 tablets daily.       aspirin 81 MG EC tablet Take 81 mg by mouth daily.       b complex vitamins tablet Take 1 tablet by mouth daily with lunch.        cholecalciferol, " vitamin D3, 1,000 unit tablet Take 2,000 Units by mouth every other day. Takes at 1200       clopidogrel (PLAVIX) 75 mg tablet Take 1 tablet (75 mg total) by mouth daily. 90 tablet 4     CONTOUR NEXT STRIPS strips TEST BLOOD SUGAR UP TO FOUR TIMES A DAY AND RECORD RESULTS. 350 each 1     cyclobenzaprine (FLEXERIL) 10 MG tablet Take 1 tablet (10 mg total) by mouth 2 (two) times a day as needed for muscle spasms. 20 tablet 0     diphenhydrAMINE (BENADRYL) 50 MG capsule TAKE TWO CAPSULES BY MOUTH NIGHTLY AT BEDTIME AS NEEDED 180 capsule 3     DULoxetine (CYMBALTA) 60 MG capsule TAKE 1 CAPSULE (60 MG TOTAL)  BY MOUTH DAILY WITH LUNCH. 90 capsule 3     haloperidol (HALDOL) 2 MG tablet Take 2 tablets (4 mg total) by mouth at bedtime. TAKE 2 TABLET (2 MG TOTAL) BY MOUTH BEDTIME. 180 tablet 3     insulin needles, disposable, 31 Ndle 3 (three) times a day. For insulin injections.       insulin regular (HUMULIN/NOVOLIN) 100 unit/mL injection Inject under the skin. 22 units at am and 32 units in the evening       insulin syringe-needle U-100 1 mL 31 gauge x 5/16 Syrg USE AS DIRECTED ONCE DAILY 100 each 2     INULIN (FIBER GUMMIES ORAL) Take by mouth bedtime. Takes 2 gummies at night       isosorbide mononitrate (IMDUR) 30 MG 24 hr tablet TAKE 1 TABLET (30 MG TOTAL) BY MOUTH DAILY. 90 tablet 1     LACTOBACILLUS ACIDOPHILUS (PROBIOTIC ORAL) Take 2 tablets by mouth daily with lunch.        lisinopril (PRINIVIL,ZESTRIL) 20 MG tablet TAKE 2 TABLETS (40 MG TOTAL)  BY MOUTH DAILY WITH LUNCH. 180 tablet 2     magnesium 250 mg Tab Take 1 tablet by mouth every other day. Takes at 1200       metFORMIN (GLUCOPHAGE) 1000 MG tablet Take 1 tablet (1,000 mg total) by mouth 2 (two) times a day with meals. 180 tablet 3     MULTIVITAMIN (MULTIPLE VITAMIN ORAL) Take 1 tablet by mouth every other day. Takes at 1200       NEEDLES, INSULIN DISPOSABLE (BD ULTRA-FINE ALISHA PEN NEEDLES MISC) Use four times a day.       nitroglycerin (NITROSTAT) 0.4 MG  "SL tablet DISSOLVE 1 TAB. UNDER TONGUE AS NEED EVERY 5 MIN. & CALL 911 IF NO RELEIF. CONTINUE 1 TAB./5 MIN. UNTIL HELP COMES 25 tablet 2     NOVOLIN N 100 unit/mL injection Inject 31 Units under the skin 2 (two) times a day before meals. (Patient taking differently: Inject under the skin 2 (two) times a day before meals. 31 units at AM and 31 units at HS) 10 mL 6     omeprazole (PRILOSEC) 20 MG capsule Take 1 capsule (20 mg total) by mouth daily. 90 capsule 3     simvastatin (ZOCOR) 40 MG tablet TAKE ONE TABLET BY MOUTH EVERY NIGHT AT BEDTIME 90 tablet 2     SYRING W-NDL,DISP,INSUL,0.5 ML (INSULIN SYRINGE MISC) Use As Directed.       tamsulosin (FLOMAX) 0.4 mg Cp24 TAKE ONE CAPSULE BY MOUTH ONCE DAILY 90 capsule 1     traMADol (ULTRAM) 50 mg tablet Take 1 tablet (50 mg total) by mouth every 6 (six) hours as needed for pain. 40 tablet 0     triamcinolone (KENALOG) 0.1 % cream Apply topically 2 (two) times a day. For up to 3 weeks 80 g 1     UBIDECARENONE (COQ-10 ORAL) Take 100 mg by mouth bedtime.       [DISCONTINUED] isosorbide mononitrate (IMDUR) 30 MG 24 hr tablet Take 1 tablet (30 mg total) by mouth daily. 90 tablet 3     [DISCONTINUED] lisinopril (PRINIVIL,ZESTRIL) 20 MG tablet Take 2 tablets (40 mg total) by mouth daily with lunch. 180 tablet 3     metoprolol succinate (TOPROL XL) 100 MG 24 hr tablet Take 1 tablet (100 mg total) by mouth daily. 90 tablet 3     No current facility-administered medications on file prior to visit.      Social Hx:  lives with wife. does not smoke.     Past medical, family and social history reviewed with no changes.   ?  ROS:   General: No fevers, chills, weight loss  CV: No chest pain   Resp: No shortness of breath or cough.   GI: No abdominal pain   MS: see HPI.   Psych: No significant change in mood, anxiety level   Neuro: No alterations in thinking, paresthesias, or weakness   ?  O  /70  Pulse 64  Temp 97.5  F (36.4  C) (Oral)   Resp 20  Ht 6' 0.75\" (1.848 m)  Wt (!) " 252 lb 8 oz (114.5 kg)  BMI 33.54 kg/m2   Vitals reviewed. Nursing note reviewed.  General Appearance: Pleasant and alert, in no acute distress. Affect slightly flat.   CV: RRR, no murmur, rubs, gallops  Resp: No respiratory distress. Clear to auscultation bilaterally. No wheezes, rales, rhonchi  MSK: able to walk normally. Sitting stiffly.   Skin: warm, dry, intact, no rash noted  Neuro: no focal deficits, CNs II-XII normal.   A/P  Mahad was seen today for hospital visit follow up.    Diagnoses and all orders for this visit:    Chronic back pain: No inciting event/injury to worsen the pain. His chronic back pain is believed to be due to arthritis. Thoracic spine x-ray in 2/2016 showed mild degenerative changes. He does not have pain radiating to his legs to suggest nerve impingement. He states he is unable to get an MRI due to his stents. The pain is of the same quality and in the same location as it has always been, so I have low suspicion for a new pathologic cause. He has no chest pain, so unlikely to be cardiac.   -Will trial increasing tylenol dose to 1,000 mg TID and Duloxetine to 60 mg BID. He tried increasing duloxetine once in the past but began to have blood in his stool, so stopped. However, this is not a typical side effect of duloxetine and it may have been caused by another factor. He is going to try increasing the dose again but will stop if he has any blood in his stool.   -I also encouraged increased activity as tolerated, and use of a heating pad.   -If pain does not improve, could consider further imaging to rule out other occult causes- could have abdominal aortic aneurysm causing abdominal pain, or could have disc disease- could do CT if MRI is not an option. No rashes to suggest shingles but should keep this on the differential if the pain persists.   -     DULoxetine (CYMBALTA) 60 MG capsule; TAKE 1 CAPSULE (60 MG TOTAL)  BY MOUTH TWICE PER DAY.   He has a follow up appt with Dr. Morales in  December but will let us know if he needs to come in sooner.       Options for treatment and follow-up care were reviewed with the patient and/or guardian. Mahad Orellana and/or guardian engaged in the decision making process and verbalized understanding of the options discussed and agreed with the final plan.    Sonya Raymond MD

## 2021-06-13 NOTE — PROGRESS NOTES
"Mahad Orellana is a 70 y.o. male who is being evaluated via a billable telephone visit.      The patient has been notified of following:     \"This telephone visit will be conducted via a call between you and your physician/provider. We have found that certain health care needs can be provided without the need for a physical exam.  This service lets us provide the care you need with a short phone conversation.  If a prescription is necessary we can send it directly to your pharmacy.  If lab work is needed we can place an order for that and you can then stop by our lab to have the test done at a later time.    Telephone visits are billed at different rates depending on your insurance coverage. During this emergency period, for some insurers they may be billed the same as an in-person visit.  Please reach out to your insurance provider with any questions.    If during the course of the call the physician/provider feels a telephone visit is not appropriate, you will not be charged for this service.\"    Patient has given verbal consent to a Telephone visit? Yes    What phone number would you like to be contacted at? [phone]    Patient would like to receive their AVS by AVS Preference: Mail a copy.    Additional provider notes: 70-year-old male reports that he needs the  safety forms completed because he was given a notification from Department of [employer] Veh[employer] because he takes insulin.  He has a continuous monitoring system for his blood sugar and has not had any episode of hypoglycemia this past year.  The lowest reading he has had on his monitor over the last few weeks has been 120.  Patient continues to struggle with chronic pain, he is describing it currently is whole body pain, but in the past it has been focused in the low back.  Patient continues to struggle with bipolar disorder.  His wife reports that his nights have been continuing to be very good, he is sleeping well, he takes his quetiapine at " bedtime.  However, days have been overall somewhat better but some days are still very bad with a lot of paranoia and anger.  She does not feel comfortable taking him to clinics or grocery stores because of some of the things that he says to people when he gets revved up.    Assessment/Plan:  1. Type 2 diabetes mellitus with hyperosmolarity without coma, unspecified whether long term insulin use (H)  Patient has had no episodes of hypoglycemia over this past year.  [employer] paperwork completed my portion today and will mail to the patient to complete his portion and then submit to the DMV.  Continue twice daily dosing of his 70/30 insulin, refills of his monitoring supplies as detailed below.  Patient currently refuses to follow-up in clinic for diabetic follow-up because of worries about COVID-19 and mistrust of the medical system.  - flash glucose scanning reader (TransferWiseSTYLE SALUD 14 DAY READER) Misc; Use 1 Units As Directed every 8 (eight) hours.  Dispense: 6 each; Refill: 6    2. Neuropathic pain, Chronic midline low back pain without sciatica  Stable.  Counseled the patient on options.  He is going to discuss with his wife whether he wants to go see a pain specialist.  He discontinued Cymbalta because it was not offering benefit and given the risk of exacerbation of his bipolar disorder in a manic direction, which has been his recent problem, I think it is not wise to increase dose.  Therefore Cymbalta will be again discontinued.    3. Bipolar Disorder  Poor control.  The only medication that he agrees to take daily is his Seroquel, continue for 100 mg at bedtime, this at least gives him good sleep and good relief of his symptoms at night.  He continues to have some paranoid and manic symptoms during the daytime but they seem to be somewhat less than over the last several months.  Patient has trialed multiple mood stabilizer trials, refuses to see a psychiatrist, see previous clinic notes for  further details.  I did talk with the patient's wife today about my concern about having him go see a specialist for his chronic pain such as at a pain clinic.  Given that he has been banned from several stores and a dental practice and has had problems when he is coming here to the clinic recently, I worry that he would not do well with a consultation at a pain clinic.  Patient and his wife are going to discuss this further and let me know if they would like me to put in a order to have him see a pain specialist or follow-up with the diabetes specialist.  Otherwise will recheck with me over the telephone again in about 3 weeks.        Phone call duration:  17 minutes

## 2021-06-13 NOTE — TELEPHONE ENCOUNTER
Please call the pharmacy and okay this request with 1 year of refills.  I tried to send over a new prescription but I am not familiar with prescribing this equipment so maybe I mixed it up?

## 2021-06-13 NOTE — TELEPHONE ENCOUNTER
Writer phoned in prescription to [facility] for a 3-month supply of Thomas Freestyle Sensors (#6) with 1 year of refills per MD request below.

## 2021-06-13 NOTE — TELEPHONE ENCOUNTER
RN cannot approve Refill Request    RN can NOT refill this medication Protocol failed and NO refill given. Last office visit: 2/5/2020 Mahad Morales MD Last Physical: 9/13/2017 Last MTM visit: Visit date not found Last visit same specialty: 5/19/2020 Elva Kong MD.  Next visit within 3 mo: Visit date not found  Next physical within 3 mo: Visit date not found      Bella Hirsch, [facility] Triage/Med Refill 12/10/2020    Requested Prescriptions   Pending Prescriptions Disp Refills     FREESTYLE THOMAS 14 DAY SENSOR Kit [Pharmacy Med Name: FreeStyle Thomas 14 Day Sensor Miscellaneous]  0     Sig: Use 1 Units As Directed every 14 (fourteen) days.       Diabetic Supplies Refill Protocol Failed - 12/9/2020  2:43 PM        Failed - A1C in last 6 months     Hemoglobin A1c   Date Value Ref Range Status   12/31/2019 9.6 (H) 3.5 - 6.0 % Final               Passed - Visit with PCP or prescribing provider visit in last 6 months     Last office visit with prescriber/PCP: 2/5/2020 Mahad Morales MD OR same dept: 5/19/2020 Elva Kong MD OR same specialty: 5/19/2020 Elva Kong MD  Last physical: 9/13/2017 Last MTM visit: Visit date not found   Next visit within 3 mo: Visit date not found  Next physical within 3 mo: Visit date not found  Prescriber OR PCP: Mahad Morales MD  Last diagnosis associated with med order: 1. Type 2 diabetes mellitus with hyperglycemia, with long-term current use of insulin (H)  - FREESTYLE THOMAS 14 DAY SENSOR Kit [Pharmacy Med Name: FreeStyle Thomas 14 Day Sensor Miscellaneous]; Use 1 Units As Directed every 14 (fourteen) days.; Refill: 0    If protocol passes may refill for 12 months if within 3 months of last provider visit (or a total of 15 months).

## 2021-06-13 NOTE — TELEPHONE ENCOUNTER
Per Provider, [place] de[employer] of [employer] form for insulin-treated diabetes report mailed to patient.  Copy to chart.

## 2021-06-14 NOTE — TELEPHONE ENCOUNTER
Refill Request  Did you contact pharmacy: Pharmacy initiated contact with MD office   Medication name:   Requested Prescriptions     Pending Prescriptions Disp Refills     insulin syringe-needle U-100 1 mL 31 gauge x 5/16 Syrg 100 each 2     Sig: USE AS DIRECTED ONCE DAILY     Who prescribed the medication: PRESCRIBER OUTSIDE OF PCP OFFICE   Requested Pharmacy: [facility]  Is patient out of medication: yes  Patient notified refills processed in 3 business days:  no  Okay to leave a detailed message: no

## 2021-06-14 NOTE — TELEPHONE ENCOUNTER
Refill Approved    Rx renewed per Medication Renewal Policy. Medication was last renewed on 2/24/20, last OV 12/28/20.    Kristy Yokr, [facility] Triage/Med Refill 1/9/2021     Requested Prescriptions   Pending Prescriptions Disp Refills     tamsulosin (FLOMAX) 0.4 mg cap [Pharmacy Med Name: Tamsulosin HCl Oral Capsule 0.4 MG] 90 capsule 0     Sig: Take 1 capsule (0.4 mg total) by mouth daily.       Alfuzosin/Tamsulosin/Silodosin Refill Protocol  Passed - 1/9/2021  2:01 AM        Passed - PCP or prescribing provider visit in past 12 months       Last office visit with prescriber/PCP: 2/5/2020 Mahad Morales MD OR same dept: Visit date not found OR same specialty: Visit date not found  Last physical: 9/13/2017 Last MTM visit: 3/28/2018 Jose Manuel Pack, PharmD   Next visit within 3 mo: Visit date not found  Next physical within 3 mo: Visit date not found  Prescriber OR PCP: Mahad Morales MD  Last diagnosis associated with med order: 1. Enlarged prostate with urinary obstruction  - tamsulosin (FLOMAX) 0.4 mg cap [Pharmacy Med Name: Tamsulosin HCl Oral Capsule 0.4 MG]; Take 1 capsule (0.4 mg total) by mouth daily.  Dispense: 90 capsule; Refill: 0    If protocol passes may refill for 12 months if within 3 months of last provider visit (or a total of 15 months).

## 2021-06-14 NOTE — TELEPHONE ENCOUNTER
Refill Approved    Rx renewed per Medication Renewal Policy. Medication was last renewed on 2/16/20.    Bella Hirsch, [facility] Triage/Med Refill 1/31/2021     Requested Prescriptions   Pending Prescriptions Disp Refills     atorvastatin (LIPITOR) 20 MG tablet [Pharmacy Med Name: Atorvastatin Calcium Oral Tablet 20 MG] 90 tablet 0     Sig: Take 1 tablet by mouth daily.       Statins Refill Protocol (Hmg CoA Reductase Inhibitors) Passed - 1/30/2021  2:01 AM        Passed - PCP or prescribing provider visit in past 12 months      Last office visit with prescriber/PCP: 2/5/2020 Mahad Morales MD OR same dept: Visit date not found OR same specialty: Visit date not found  Last physical: 9/13/2017 Last MTM visit: 3/28/2018 Jose Manuel Pack, PharmD   Next visit within 3 mo: Visit date not found  Next physical within 3 mo: Visit date not found  Prescriber OR PCP: Mahad Morales MD  Last diagnosis associated with med order: 1. Dyslipidemia  - atorvastatin (LIPITOR) 20 MG tablet [Pharmacy Med Name: Atorvastatin Calcium Oral Tablet 20 MG]; Take 1 tablet by mouth daily.  Dispense: 90 tablet; Refill: 0    If protocol passes may refill for 12 months if within 3 months of last provider visit (or a total of 15 months).

## 2021-06-14 NOTE — TELEPHONE ENCOUNTER
Called back and spoke with wife to relay PCP message.  Wife said pain MD knew nothing about this implant.  Gave number to [facility] in [place] to inquire if an orthopedist might know. Pt was very angry and yelling in the background.  Thanks.

## 2021-06-14 NOTE — PROGRESS NOTES
VIVIENNE Orellana is a 67 y.o. male here for follow up on back pain. He had an acute flare of his chronic low back pain at last visit and we increased his Cymbalta. He is also taking 5 mg Flexeril BID PRN. His pain is now a 2-3, when it was a 10 previously. He is wondering if there is a type of medication that can take his pain away completely.     He and his wife Rosie feel like he sometimes walks on his toes. Also, sometimes is slow to get going but then starts walking fast. They think this is odd.     No pain going down his legs. The pain stays located in his low back. Sometimes radiates to his sides. He had an abd u/s of his aorta in 2011 because his father had a AAA. He was told he had no aneurysm. He has never smoked.     He feels weak. Can't walk long distances due to back pain. He tried PT a few years ago but it didn't help that much.   Past Medical History:   Diagnosis Date     Arthritis      Asthma      Bipolar affective disorder      BPH (benign prostatic hyperplasia) 01/06/2015     CAD (coronary artery disease) 07/20/2015     Diabetes mellitus      Dyslipidemia, goal LDL below 70 07/20/2015     Eczema      Essential hypertension      Tardive dyskinesia      Current Outpatient Prescriptions on File Prior to Visit   Medication Sig Dispense Refill     acetaminophen (TYLENOL) 500 MG tablet Take 500 mg by mouth 2 (two) times a day.       albuterol (PROAIR HFA;PROVENTIL HFA;VENTOLIN HFA) 90 mcg/actuation inhaler Inhale 1-2 puffs every 4 (four) hours as needed for wheezing. 1 each 1     ascorbic acid, vitamin C, (VITAMIN C) 125 mg Chew Chew 3 tablets daily.       aspirin 81 MG EC tablet Take 81 mg by mouth daily.       atenolol (TENORMIN) 50 MG tablet Take 50 mg by mouth daily.       b complex vitamins tablet Take 1 tablet by mouth daily with lunch.        cholecalciferol, vitamin D3, 1,000 unit tablet Take 2,000 Units by mouth every other day. Takes at 1200       clopidogrel (PLAVIX) 75 mg tablet Take 1 tablet  (75 mg total) by mouth daily. 90 tablet 4     CONTOUR NEXT STRIPS strips TEST BLOOD SUGAR UP TO FOUR TIMES A DAY AND RECORD RESULTS. 350 each 1     cyclobenzaprine (FLEXERIL) 10 MG tablet Take 1 tablet (10 mg total) by mouth 2 (two) times a day as needed for muscle spasms. 20 tablet 0     diphenhydrAMINE (BENADRYL) 50 MG capsule TAKE TWO CAPSULES BY MOUTH NIGHTLY AT BEDTIME AS NEEDED 180 capsule 3     DULoxetine (CYMBALTA) 60 MG capsule TAKE 1 CAPSULE (60 MG TOTAL)  BY MOUTH TWICE PER DAY. 180 capsule 3     haloperidol (HALDOL) 2 MG tablet Take 2 tablets (4 mg total) by mouth at bedtime. TAKE 2 TABLET (2 MG TOTAL) BY MOUTH BEDTIME. 180 tablet 3     insulin needles, disposable, 31 Ndle 3 (three) times a day. For insulin injections.       insulin regular (HUMULIN/NOVOLIN) 100 unit/mL injection Inject under the skin. 22 units at am and 32 units in the evening       insulin syringe-needle U-100 1 mL 31 gauge x 5/16 Syrg USE AS DIRECTED ONCE DAILY 100 each 2     INULIN (FIBER GUMMIES ORAL) Take by mouth bedtime. Takes 2 gummies at night       isosorbide mononitrate (IMDUR) 30 MG 24 hr tablet TAKE 1 TABLET (30 MG TOTAL) BY MOUTH DAILY. 90 tablet 1     LACTOBACILLUS ACIDOPHILUS (PROBIOTIC ORAL) Take 2 tablets by mouth daily with lunch.        lisinopril (PRINIVIL,ZESTRIL) 20 MG tablet TAKE 2 TABLETS (40 MG TOTAL)  BY MOUTH DAILY WITH LUNCH. 180 tablet 2     magnesium 250 mg Tab Take 1 tablet by mouth every other day. Takes at 1200       metFORMIN (GLUCOPHAGE) 1000 MG tablet Take 1 tablet (1,000 mg total) by mouth 2 (two) times a day with meals. 180 tablet 3     metoprolol succinate (TOPROL XL) 100 MG 24 hr tablet Take 1 tablet (100 mg total) by mouth daily. 90 tablet 3     MULTIVITAMIN (MULTIPLE VITAMIN ORAL) Take 1 tablet by mouth every other day. Takes at 1200       NEEDLES, INSULIN DISPOSABLE (BD ULTRA-FINE ALISHA PEN NEEDLES MISC) Use four times a day.       nitroglycerin (NITROSTAT) 0.4 MG SL tablet DISSOLVE 1 TAB.  "UNDER TONGUE AS NEED EVERY 5 MIN. & CALL 911 IF NO RELEIF. CONTINUE 1 TAB./5 MIN. UNTIL HELP COMES 25 tablet 2     NOVOLIN N 100 unit/mL injection Inject 31 Units under the skin 2 (two) times a day before meals. (Patient taking differently: Inject under the skin 2 (two) times a day before meals. 31 units at AM and 31 units at HS) 10 mL 6     omeprazole (PRILOSEC) 20 MG capsule Take 1 capsule (20 mg total) by mouth daily. 90 capsule 3     simvastatin (ZOCOR) 40 MG tablet TAKE ONE TABLET BY MOUTH EVERY NIGHT AT BEDTIME 90 tablet 2     SYRING W-NDL,DISP,INSUL,0.5 ML (INSULIN SYRINGE MISC) Use As Directed.       tamsulosin (FLOMAX) 0.4 mg Cp24 TAKE ONE CAPSULE BY MOUTH ONCE DAILY 90 capsule 1     triamcinolone (KENALOG) 0.1 % cream Apply topically 2 (two) times a day. For up to 3 weeks 80 g 1     UBIDECARENONE (COQ-10 ORAL) Take 100 mg by mouth bedtime.       No current facility-administered medications on file prior to visit.      Social Hx:  Never smoker. Does not drink etoh.     ROS:   General: No fevers, chills, weight changes  CV: No chest pain or palpitations.  Resp: No shortness of breath or cough.   GI: No abdominal pain  MS: No arthralgias or myalgias other than back pain as above.  Psych: No significant change in mood, anxiety level   Skin: No new rashes or lesions  Neuro: No alterations in thinking, paresthesias, or weakness   ?  O  /70  Pulse 72  Temp 97.7  F (36.5  C) (Oral)   Resp 20  Ht 6' 0.75\" (1.848 m)  Wt (!) 257 lb 4 oz (116.7 kg) Comment: with shoes  BMI 34.17 kg/m2   Vitals reviewed. Nursing note reviewed.  General Appearance: Pleasant and alert, in no acute distress  HEENT: mucous membranes moist,  CV: RRR, no murmur, rubs, gallops  Resp: No respiratory distress. Clear to auscultation bilaterally. No wheezes, rales, rhonchi  Abd: Soft, nontender, nondistended, bowel sounds present. No masses.  Back: no tenderness to palpation along spine. Pain occurs when he stands up. STraight leg " test negative. No abnormal curvature to spine.   Skin: warm, dry, intact, no rash noted  Neuro: no focal deficits, CNs II-XII normal. Strength and reflexes in lower extremities are normal.   A/P  Mahad was seen today for follow-up.    Diagnoses and all orders for this visit:    Chronic back pain: ok to take Flexeril BID PRN. It does make him tired, so he was advised to use sparingly. Discussed that it may not be possible for his pain to go away entirely. The goal should be to get it to a tolerable level where he can do the things he wants to do and be reasonably active. I emphasized the importance of exercise- however he can make this happen. He and Rosie mentioned walking and recumbent bicycle as possibilities .They will let us know if he wants to try PT again.   No signs of neurological compromise.   -     cyclobenzaprine (FLEXERIL) 5 MG tablet; Take 1 tablet (5 mg total) by mouth 3 (three) times a day as needed for muscle spasms.    Type 2 diabetes mellitus: old meter is no longer covered. Needs new one.   -     blood-glucose meter (ONETOUCH VERIO IQ METER) Misc; Use 1 Device As Directed 3 (three) times a day.  -     Discontinue: blood glucose test strips; Use 1 each As Directed as needed. Dispense brand per patient's insurance at pharmacy discretion.     Return in 1 month for diabetes visit with Dr. Morales.     Options for treatment and follow-up care were reviewed with the patient and/or guardian. Mahad Orellana and/or guardian engaged in the decision making process and verbalized understanding of the options discussed and agreed with the final plan.    Sonya Raymond MD

## 2021-06-14 NOTE — TELEPHONE ENCOUNTER
Reason for Call: Request for an order or referral:    Order or referral being requested: Pt states Dr. Morales order OPS Paravertbral Facet Joint Inj Lumbar and pt cancel appt after the provider call to explain what the procedure is about. He does not want an injection. He saw a commercial on TV about an implant or a patch the helps with all pains. The facility is located near [facility] and [employer]. Spouse and patient can't remember name of the product. Wondering if Dr. Morales would know what they are talking about.    Date needed: as soon as possible    Has the patient been seen by the PCP for this problem? YES    Additional comments: n/a    Phone number Patient can be reached at:  [phone]    Best Time:  anytime    Can we leave a detailed message on this number?  Yes    Call taken on 1/19/2021 at 10:52 AM by Romario Feldman

## 2021-06-14 NOTE — PROGRESS NOTES
"Mahad Orellana is a 70 y.o. male who is being evaluated via a billable telephone visit.      The patient has been notified of following:     \"This telephone visit will be conducted via a call between you and your physician/provider. We have found that certain health care needs can be provided without the need for a physical exam.  This service lets us provide the care you need with a short phone conversation.  If a prescription is necessary we can send it directly to your pharmacy.  If lab work is needed we can place an order for that and you can then stop by our lab to have the test done at a later time.    Telephone visits are billed at different rates depending on your insurance coverage. During this emergency period, for some insurers they may be billed the same as an in-person visit.  Please reach out to your insurance provider with any questions.    If during the course of the call the physician/provider feels a telephone visit is not appropriate, you will not be charged for this service.\"    Patient has given verbal consent to a Telephone visit? Yes    What phone number would you like to be contacted at? [phone]    Patient would like to receive their AVS by AVS Preference: Mail a copy.    Additional provider notes: Things have been stable for the patient since our last telephone visit.  He is interested in proceeding with the pain clinic consultation.  He has done some reading about implantable pain management devices and is interested in talking with a specialist about this.  Patient has a long history of chronic pain, see previous notes for details, he has been through multiple treatment regimens in the past with poor tolerance of most medications.  His history is also complicated by severe and poorly controlled bipolar disorder with recent manic exacerbations.  Patient is very reluctant to use additional medications beyond his quetiapine or get additional psychiatric care.  He does continue to take " quetiapine at bedtime and sleeps very well through the night.  His wife reports that his  nights have continued to go very well.  He continues to have intermittent agitation during the daytime, he rarely takes the additional 100 mg morning dose of quetiapine because he says it makes him feel too tired.  Diabetes has been stable.  Lowest blood sugar reading in the 120s, no hypoglycemia.    Assessment/Plan:  1. Neuropathic pain, Chronic midline low back pain without sciatica  - Ambulatory referral to Pain Clinic    2.  Bipolar Disorder  Counseled the patient to take his additional 100 mg quetiapine dose in the morning on the days that he has to go out and do anything such as going to an appointment as I think this will help reduce his agitation and increase the chances of success with specialty care.    4. Type 2 diabetes mellitus with hyperosmolarity without coma, unspecified whether long term insulin use (H)  Stable.  Continue current treatment plan.        Phone call duration:  12 minutes

## 2021-06-14 NOTE — PROGRESS NOTES
"ASSESMENT AND PLAN:  Diagnoses and all orders for this visit:    1. Chest pain, most consistent with muscular  Non cardiac or pulmonic in origin.  Pt was on opoid pain med with complete relief.  Currently taking 1000 mg Tylenol daily with only minor relief.  F/u if no relief.    2. Bipolar Disorder  Optimal control with new dose: 3 tabs ( total 6 MG) instead of 2 (total 4 MG).   Ordered:  -     haloperidol (HALDOL) 2 MG tablet; 3 tabs at bedtime.  Total (6 MG)  Dispense: 90 tablet; Refill: 0    Over 25 minutes total time spent with patient, with more than 50% in counseling and coordination of care on the above issues.   Discussed with patient indications for emergent medical attention and routine F/u.  Patient understands and agrees with treatment plan.     SUBJECTIVE:  Mahad Orellana is a 67 y.o. Male with a hx of CAD, stents, DM,  Bipolar, Chronic back pain who presents with 1 weeks of Left chest pain.  Pain is sharp and reproducible with pressure,  specific body movements/positions, and inspiratory pain. Rated as 10, though patient is very comfortable and not in acute distress or appearing in severe pain.  Denies pain on exertion, chest tightness, SOB, PND, orthopnea, edema.  \"Pain was on the Right side of his chest last week,and now it's on his left.\"   Pt and wife is concerned he might have Parkinson bc he \"shuffles very fast\".  Pt requested Xrays of his chest to r/o any tumors or unseen cause for his muscular pain.  Pt states \" I know it's not my heart because it's not deep inside.\"  Pt also requested an MRI of his brain in case there is a tumor or he has Parkinson.   Haldol SE was explained to pt and wife.  Wife does not think  It's TD bc her brother was on Haldol for years and did not have similar SE.  Wife requested increasing Haldol from 4 MG total to 6 MG, \"I can tell the difference.\"   Pt encouraged to F/u for DM.    Past Medical History:   Diagnosis Date     Arthritis      Asthma      Bipolar affective " disorder      BPH (benign prostatic hyperplasia) 01/06/2015     CAD (coronary artery disease) 07/20/2015     Diabetes mellitus      Dyslipidemia, goal LDL below 70 07/20/2015     Eczema      Essential hypertension      Tardive dyskinesia      Patient Active Problem List   Diagnosis     Drug-induced Tardive Dyskinesia     Dyslipidemia, goal LDL below 70     Essential hypertension     Type 2 diabetes mellitus     Bipolar Disorder     Enlarged prostate with urinary obstruction     Coronary artery disease     Erectile dysfunction     Gastroesophageal reflux disease without esophagitis     Chronic back pain     Current Outpatient Prescriptions   Medication Sig Dispense Refill     acetaminophen (TYLENOL) 500 MG tablet Take 500 mg by mouth 2 (two) times a day.       albuterol (PROAIR HFA;PROVENTIL HFA;VENTOLIN HFA) 90 mcg/actuation inhaler Inhale 1-2 puffs every 4 (four) hours as needed for wheezing. 1 each 1     ascorbic acid, vitamin C, (VITAMIN C) 125 mg Chew Chew 3 tablets daily.       aspirin 81 MG EC tablet Take 81 mg by mouth daily.       atenolol (TENORMIN) 50 MG tablet Take 50 mg by mouth daily.       b complex vitamins tablet Take 1 tablet by mouth daily with lunch.        blood glucose test strips Dispense brand per patient's insurance at pharmacy discretion. TEST 3 times per day. 350 strip 3     blood-glucose meter (ONETOUCH VERIO IQ METER) Misc Use 1 Device As Directed 3 (three) times a day. 1 each 0     cholecalciferol, vitamin D3, 1,000 unit tablet Take 2,000 Units by mouth every other day. Takes at 1200       clopidogrel (PLAVIX) 75 mg tablet Take 1 tablet (75 mg total) by mouth daily. 90 tablet 4     CONTOUR NEXT STRIPS strips TEST BLOOD SUGAR UP TO FOUR TIMES A DAY AND RECORD RESULTS. 350 each 1     cyclobenzaprine (FLEXERIL) 10 MG tablet Take 1 tablet (10 mg total) by mouth 2 (two) times a day as needed for muscle spasms. 20 tablet 0     cyclobenzaprine (FLEXERIL) 5 MG tablet Take 1 tablet (5 mg total)  by mouth 3 (three) times a day as needed for muscle spasms. 90 tablet 3     diphenhydrAMINE (BENADRYL) 50 MG capsule TAKE TWO CAPSULES BY MOUTH NIGHTLY AT BEDTIME AS NEEDED 180 capsule 3     DULoxetine (CYMBALTA) 60 MG capsule TAKE 1 CAPSULE (60 MG TOTAL)  BY MOUTH TWICE PER DAY. 180 capsule 3     generic lancets Dispense brand per patient's insurance at pharmacy discretion. TESTS 3 times per day 350 each 3     haloperidol (HALDOL) 2 MG tablet 3 tabs at bedtime.  Total (6 MG) 90 tablet 0     insulin needles, disposable, 31 Ndle 3 (three) times a day. For insulin injections.       insulin regular (HUMULIN/NOVOLIN) 100 unit/mL injection Inject under the skin. 22 units at am and 32 units in the evening       insulin syringe-needle U-100 1 mL 31 gauge x 5/16 Syrg USE AS DIRECTED ONCE DAILY 100 each 2     INULIN (FIBER GUMMIES ORAL) Take by mouth bedtime. Takes 2 gummies at night       isosorbide mononitrate (IMDUR) 30 MG 24 hr tablet TAKE ONE TABLET BY MOUTH ONCE DAILY 90 tablet 2     LACTOBACILLUS ACIDOPHILUS (PROBIOTIC ORAL) Take 2 tablets by mouth daily with lunch.        lisinopril (PRINIVIL,ZESTRIL) 20 MG tablet TAKE 2 TABLETS (40 MG TOTAL)  BY MOUTH DAILY WITH LUNCH. 180 tablet 2     magnesium 250 mg Tab Take 1 tablet by mouth every other day. Takes at 1200       metFORMIN (GLUCOPHAGE) 1000 MG tablet Take 1 tablet (1,000 mg total) by mouth 2 (two) times a day with meals. 180 tablet 3     metoprolol succinate (TOPROL XL) 100 MG 24 hr tablet Take 1 tablet (100 mg total) by mouth daily. 90 tablet 3     MULTIVITAMIN (MULTIPLE VITAMIN ORAL) Take 1 tablet by mouth every other day. Takes at 1200       NEEDLES, INSULIN DISPOSABLE (BD ULTRA-FINE ALISHA PEN NEEDLES MISC) Use four times a day.       nitroglycerin (NITROSTAT) 0.4 MG SL tablet DISSOLVE 1 TAB. UNDER TONGUE AS NEED EVERY 5 MIN. & CALL 911 IF NO RELEIF. CONTINUE 1 TAB./5 MIN. UNTIL HELP COMES 25 tablet 2     NOVOLIN N 100 unit/mL injection Inject 31 Units under the  "skin 2 (two) times a day before meals. (Patient taking differently: Inject under the skin 2 (two) times a day before meals. 31 units at AM and 31 units at HS) 10 mL 6     omeprazole (PRILOSEC) 20 MG capsule Take 1 capsule (20 mg total) by mouth daily. 90 capsule 3     simvastatin (ZOCOR) 40 MG tablet TAKE ONE TABLET BY MOUTH EVERY NIGHT AT BEDTIME 90 tablet 2     SYRING W-NDL,DISP,INSUL,0.5 ML (INSULIN SYRINGE MISC) Use As Directed.       tamsulosin (FLOMAX) 0.4 mg Cp24 TAKE ONE CAPSULE BY MOUTH ONCE DAILY 90 capsule 1     triamcinolone (KENALOG) 0.1 % cream Apply topically 2 (two) times a day. For up to 3 weeks 80 g 1     UBIDECARENONE (COQ-10 ORAL) Take 100 mg by mouth bedtime.       No current facility-administered medications for this visit.      History   Smoking Status     Never Smoker   Smokeless Tobacco     Never Used       OBJECTIVE: /68  Pulse 68  Temp 98  F (36.7  C) (Oral)   Resp 18  Ht 6' 0.75\" (1.848 m)  Wt (!) 258 lb 12 oz (117.4 kg)  BMI 34.37 kg/m2     No results found for this or any previous visit (from the past 24 hour(s)).    ROS:  A complete ROS was taken and all negative except stated in HPI.     Physical Exam:   GEN- Alert, awake, not in acute distress.   HEENT- PERRL, no erythema, conjunctiva clear, no discharge. TMs intact, no drainage, erythema.    CV- Normal S1 & S2. No murmurs, rubs, gallops. No JVD. No bruit.   RESP- Non-labored, RRR, CTAB.    ABDOMINAL-Non-tender. No organomegaly. No rigidity/guarding. Normal bowel sounds.   EXTREM- No edema. Equal in strength, sensation. Pulses present bilaterally.    CHEST- Mild tenderness on Left pectoral muscles upon deep palpation.    SKIN- Warm, no rashes, lesions, masses.    NEURO- CN II-XII intact. No focal deficiency.     Lelo Brown PA-C           "

## 2021-06-15 NOTE — PROGRESS NOTES
MTM Encounter    Assessment/Plan  Back Pain: Uncontrolled with the use of low dose Tylenol.  Discussed with PCP, refill of tramadol sent today.  Plan to discuss higher use of APAP.  - Refill of tramadol    Bipolar disorder: Uncontrolled.  Saulo will be following up with the neurologist regarding his tardive dyskinesia at some point in the future.  He is currently not on a mood stabilizer but is on a high dose SNRI for his back pain, which can lead to rapid cycling.  I discussed mood stabilizing options with Saulo and his wife Rosie and we agreed that quetiapine sounded like the best fit due to its ability to help him sleep and reduce anxiety.  I subsequently discussed this with Saulo's PCP who also agreed that this sounded like a good option.  - quetiapine 50mg day 1, 100mg day 2, 200mg day 3, 300mg at bedtime every day after that  - stop diphenhydramine    Diabetes: Uncontrolled to goal 7%.  It appears that Mahad may have his insulin administration mixed up a little bit, as he is not taking any insulin with lunch which is his biggest meal of the day.  This probably explains his overall poor control coinciding with episodes of mild hypoglycemia.  We will further evaluate this at his next appointment when he returns with his blood sugar meter.  - assess glucose readings, adjust insulin in one month    GERD: Discussed the risks of long-term PPI use including dementia and CKD.  I agreed that switching to an H2 blocker would be a good idea, apparently Saulo and Rosie have had a negative experience with ranitidine in the past and are not interested in trying this medication.  We will use Pepcid as an alternate.  I also advised that OTC Tums are okay to use with famotidine for acid reflux as needed.  - stop omeprazole  - start famotidine 40mg each morning    Hypertension: Well controlled to goal of <140/90.  Possibly taking metoprolol XL and atenolol both.  He does not have metoprolol on his home medication list and they do not  bring pill bottles today.  Rosie agrees to bring these with to their next appointment.  -Consider stopping atenolol if also on metoprolol    CAD: Unclear if clopidogrel was intended to be used long term or not.  I will send a message to his cardiologist, who Saulo is seeing in 2-3 weeks.    Dyslipidemia: On appropriate dose statin; may need to increase with the start of quetiapine.  Will recheck lipid cascade in six months.    Vitamins: Nothing concerning from a safety perspective.  Will address further at future visit.    Follow Up  One month      Subjective/Objective  Mahad Orellana is a 67 y.o. male here for an initial medication therapy management (MTM) appointment; his chief concern today is a comprehensive medication review.    Saulo also brings in a form for disabled parking that he would like to have Dr. Morales sign.  Rosie has a permanent handicapped parking permit.    Medication Review: Rosie is helping to manage Saulo's medications with a pill box.  They do not bring his medication bottles with them today, but she does bring a home written med list.  It is accurate along with ours except that it is missing metoprolol, which was recently prescribed and dispensed.    Back Pain: Saulo would like to have a refill of his tramadol which works well for his pain, but it was stopped previously because he became constipated.  His back pain is due to arthritis which causes 10/10 pain frequently.  Tramadol cuts the pain down quite a bit, but it is still present.  The tramadol also helps him sleep better.  He is walking 3 days a week at the mall (Rosie states Saulo walks in and sits down and waits for Rosie).  He has a cane which helps him walk so he doesn't fall, but uses it infrequently.  Saulo is also taking Flexril at noon and 8pm for back pain - it seems to help him calm down and helps with pain.  He also takes 500mg acetaminophen twice daily.  He is also taking Cymbalta for back pain, recently increased to 60mg BID.  Higher  dose of Cymbalta previously caused blood in the stool, but he was advised to increase again because the hematochezia was unlikely from the duloxetine.    Bipolar Disorder: Saulo was previously on haldol for 20 years for bipolar disorder.  He notes that he was previously given several expensive medications that put them into financial struggles.  He was taken off of haldol in Dec 2017 after developing tardidive dyskinesa. He has no control over his feet; he has fallen several times (most recently two weeks ago).  He went to PT/OT for a while for help with this, but it made it worse.  He is not currently taking anything for his bipolar disorder.  Saulo was also taking 75mg of diphenhydramine at bedtime to help with the side effects of haldol, but has continued to take this despite quitting haldol.  Of note, Rosie's brother had serious kidney problems on lithium and they would like to avoid this.    Diabetes: Taking Novolin NPH 35 units in the morning and 35 units in the evening.  He is also taking Novolin R 22 units with breakfast and 32 units with dinner, none with lunch.  His largest meal of the day is 3pm, followed by dinner and breakfast is small.  He does note occasional lows in the 70s.  Lab Results   Component Value Date    HGBA1C 8.1 (H) 09/13/2017    HGBA1C 8.9 (H) 05/24/2017    HGBA1C 7.9 (H) 09/28/2016     Lab Results   Component Value Date    MICROALBUR 0.52 11/14/2012    LDLCALC 50 12/19/2016    CREATININE 0.88 02/24/2017       GERD: Saulo has been taking omeprazole for 20 years.  He would like to switch from this to famotidine because he has heard bad things about long term use of this medication and kidney disease.  He was given a prescription for famotidine, but it was prescribed OTC, which did not result in a prescription fill.    Hypertension: Taking lisinopril 20mg BID, atenolol 50mg at bedtime,     CAD: Taking clopidogrel since 2015 after stent placement.  Taking baby aspirin at bedtime also. Stents were  evaluated 12 months later and found to be patent; no clear plan on Plavix continuation or not.  He has had intractable epistaxis since and has had several falls.  Using nitro tablets 1-2 times per month.  He is seeing Dr. Posey in 2-3 weeks.     Dyslipidemia: Taking simvastatin 40mg at night.  Lab Results   Component Value Date    CHOL 134 12/19/2016    CHOL 121 07/20/2015     Lab Results   Component Value Date    HDL 27 (L) 12/19/2016    HDL 27 (L) 07/20/2015     Lab Results   Component Value Date    LDLCALC 50 12/19/2016    LDLCALC 46 07/20/2015     Lab Results   Component Value Date    TRIG 286 (H) 12/19/2016    TRIG 242 (H) 07/20/2015     No components found for: CHOLHDL    Vitamins: Taking a B complex, 2 probiotics, 1 senior multivitamin, 1 CoQ 10 100mg, 2 vitamin C gummies in AM and 2 PM, 2 fiber gummies in the evening, and 250mg magnesium.    ----------------    Mahad's medication list was reviewed with them, discussing reason for use, directions for use, and potential side effects of each medication as needed. Indication, safety, efficacy, and convenience was assessed for all medications addressed above.  No environmental factors were noted currently affecting patient.  This care plan was communicated via EMR with his primary care provider, Mahad Morales MD, who is the authorizing prescriber for this visit.  Direct supervision was available by either the patient's PCP or other available provider.  Time and complexity billing metrics are included in the docflowsheet linked to this visit.  Time spent: 65 minutes      Jose Manuel Pack PharmD  [facility] Pharmacist  [facility]  [phone]

## 2021-06-15 NOTE — TELEPHONE ENCOUNTER
Last office visit: 12/28/2020  Last ordered: 01/09/2020 (DISCONTINUED 02/2020)  Last lab check:   Next appointment: 02/10/2021    Chart reviewed. Please review findings below.     Assessment/Plan:  1. Neuropathic pain, Chronic midline low back pain without sciatica  - Ambulatory referral to Pain Clinic     2.  Bipolar Disorder  Counseled the patient to take his additional 100 mg quetiapine dose in the morning on the days that he has to go out and do anything such as going to an appointment as I think this will help reduce his agitation and increase the chances of success with specialty care.     4. Type 2 diabetes mellitus with hyperosmolarity without coma, unspecified whether long term insulin use (H)  Stable.  Continue current treatment plan.

## 2021-06-15 NOTE — PROGRESS NOTES
ASSESMENT AND PLAN:  Diagnoses and all orders for this visit:    Schizoaffective disorder, bipolar type (H) in severe exacerbation  Patient has previously carried a diagnosis of bipolar disorder.  Extensive counseling with the patient.  He refuses psychiatric follow-up, he has refused or failed multiple trials of mood stabilizing medications, see previous notes for details.  Extensive discussion with the patient and his wife around the options.  Although I would prefer that he sees a psychiatrist, given the intense effect on the patient's life and his wife's quality of life and the community around him I think his best option is a long-acting drug as prescribed below.  We reviewed the risks and benefits of the medication and for now he will continue to take his quetiapine at bedtime, he is not willing to give that up.  We may be able to titrate that down in the near future depending on how he responds to the injection.  Because he is not stopping the quetiapine, we will not give the 1 week booster dose of Invega and instead have him follow-up in 1 month for his next monthly injection.  -     paliperidone palmitate IM injection 234 mg (INVEGA SUSTENNA)    Type 2 diabetes mellitus with hyperosmolarity without coma, unspecified whether long term insulin use (H)  As detailed in previous notes the patient has not been able to make progress on his other medical conditions because of his severely out-of-control mental health disorder.  Plan as detailed above.    Chronic midline low back pain without sciatica  Plan as detailed above.      Reviewed the risks and benefits of the treatment plan with the patient and/or caregiver and we discussed indications for routine and emergent follow-up.      SUBJECTIVE: Patient has been struggling with intense paranoia and paranoid and tangential thinking.  Please see previous notes for details, this is the first time that he has come into the clinic in quite some time.  Previously had  "been managing over the telephone.  He continues to have good nights, he sleeps well and takes his quetiapine every night at bedtime.  However, he declines to take daytime medication.  He has failed or refused multiple treatment trials previously.  See previous notes for details.  His agitation and paranoid/delusional thinking has been getting progressively worse and has become intolerable to his wife.  She reports that he is constantly agitated and sometimes yells and cannot be around other people for he almost always will make inappropriate or racist comments.  She reports that he has been banned from multiple places of business and care because of making racist comments.  This is out of character for him but he has changed dramatically over the past year.  Today in clinic he goes off on tangents that are paranoid and delusional thinking involving  American people, Muslims, Barack Obama, medical providers.  However, he reports that after many discussions with his wife he is agreeable with trying the new medication that we have been discussing recently over the telephone-Invega.  He is not willing to give up his nighttime quetiapine as this is \"the only medication that works for me.\"    Past Medical History:   Diagnosis Date     Arthritis      Asthma      Bipolar affective disorder (H)      BPH (benign prostatic hyperplasia) 01/06/2015     CAD (coronary artery disease) 07/20/2015     Diabetes mellitus (H)      Dyslipidemia, goal LDL below 70 07/20/2015     Eczema      Essential hypertension      Tardive dyskinesia      Patient Active Problem List   Diagnosis     Subacute dyskinesia due to drug     Dyslipidemia, goal LDL below 70     Essential hypertension     Type 2 diabetes mellitus (H)     Bipolar Disorder     Enlarged prostate with urinary obstruction     Coronary artery disease     Erectile dysfunction     Gait abnormality     Gastroesophageal reflux disease without esophagitis     Chronic back pain     " Neuropathic pain     Primary osteoarthritis involving multiple joints     Obesity (BMI 35.0-39.9) with comorbidity (H)     Schizoaffective disorder, bipolar type (H)     Current Outpatient Medications   Medication Sig Dispense Refill     acetaminophen (TYLENOL) 500 MG tablet Take 500 mg by mouth 2 (two) times a day.       aspirin 81 MG EC tablet Take 81 mg by mouth daily.       atenoloL (TENORMIN) 50 MG tablet TAKE ONE TABLET BY MOUTH EVERY NIGHT AT BEDTIME 90 tablet 3     atorvastatin (LIPITOR) 20 MG tablet Take 1 tablet by mouth daily.  90 tablet 3     blood glucose meter (GLUCOMETER) OneTouch Ultra. Dispense glucometer brand per patient's insurance at pharmacy discretion. Pt testing 3 times per day. 1 each 0     cholecalciferol, vitamin D3, 1,000 unit tablet Take 2,000 Units by mouth every other day. Takes at 1200       diclofenac sodium (VOLTAREN) 1 % Gel Apply 4 GRAMS three times a day as needed TO RIGHT KNEE 100 g 4     flash glucose scanning reader (FREESTYLE SALUD 14 DAY READER) Misc Use 1 Units As Directed every 8 (eight) hours. 6 each 6     FREESTYLE SALUD 14 DAY SENSOR Kit Use 1 Units As Directed every 14 (fourteen) days. 1 kit 11     generic lancets Dispense brand per patient's insurance at pharmacy discretion. TESTS 3 times per day 350 each 3     insulin needles, disposable, 31 Ndle 3 (three) times a day. For insulin injections.       insulin NPH-insulin regular (NOVOLIN 70/30 U-100 INSULIN) 100 unit/mL (70-30) injection Inject 85-90 Units under the skin 2 (two) times a day before meals. 50 mL 11     insulin syringe-needle U-100 1 mL 31 gauge x 5/16 Syrg USE AS DIRECTED ONCE DAILY 100 each 2     isosorbide mononitrate (IMDUR) 30 MG 24 hr tablet Take 1 tablet (30 mg total) by mouth daily. 90 tablet 2     lisinopriL (PRINIVIL,ZESTRIL) 40 MG tablet Take 1 tablet (40 mg total) by mouth daily. 90 tablet 3     metFORMIN (GLUCOPHAGE) 1000 MG tablet TAKE ONE TABLET BY MOUTH 2 TIMES A DAY WITH MEALS. 180 tablet  "3     nitroglycerin (NITROSTAT) 0.4 MG SL tablet PLACE ONE TABLET UNDER THE TONGUE EVERY 5 MINUTES AS NEEDED FOR CHEST PAIN.  MAXIMUM DOSE OF 3 TABLETS IN 15 MINUTES. 25 tablet 3     omeprazole (PRILOSEC) 20 MG capsule Take 1 capsule (20 mg total) by mouth daily before breakfast. 90 capsule 3     ONETOUCH ULTRA BLUE TEST STRIP strips TEST 3 times per day. 350 strip 3     pregabalin (LYRICA) 50 MG capsule Take 1 capsule (50 mg total) by mouth 2 (two) times a day. 60 capsule 1     QUEtiapine (SEROQUEL) 100 MG tablet Take 1-3 tablets (100-300 mg total) by mouth every morning. Continue to take 400 mg at bedtime (seperate rx) 90 tablet 11     QUEtiapine (SEROQUEL) 400 MG tablet TAKE ONE TABLET BY MOUTH AT BEDTIME  30 tablet 11     tamsulosin (FLOMAX) 0.4 mg cap Take 1 capsule (0.4 mg total) by mouth daily. 90 capsule 3     No current facility-administered medications for this visit.      Social History     Tobacco Use   Smoking Status Never Smoker   Smokeless Tobacco Never Used   Tobacco Comment    no passive exposure       OBJECTICE: /62 (Patient Site: Left Arm, Patient Position: Sitting)   Pulse 90   Temp 97.5  F (36.4  C) (Oral)   Resp 20   Ht 6' 1\" (1.854 m)   Wt (!) 262 lb (118.8 kg)   SpO2 97%   BMI 34.57 kg/m       No results found for this or any previous visit (from the past 24 hour(s)).     CV-regular rate and rhythm   RESP-lungs clear   Psychiatric-appearance is well-groomed, speech is pressured, mood is not depressed, affect is agitated, thought content negative for suicidal or homicidal ideation, thought processing positive for paranoid and delusional thinking, tangential thinking.  Judgment and insight are somewhat limited by his medical condition.    Mahad Morales"

## 2021-06-15 NOTE — TELEPHONE ENCOUNTER
Reason for Call:  Other Prior Auth     Detailed comments: Wife called on behalf of pt. They received a ltr from [facility] stating they will need a PA for his Revision3E 14 DAY SENSOR Kit. The were given a number for care team to call and authorize: [phone].    Phone Number Patient can be reached at: Home number on file [phone] (home)    Best Time: anytime    Can we leave a detailed message on this number?: Yes    Call taken on 3/10/2021 at 1:33 PM by Madhu Donis

## 2021-06-15 NOTE — TELEPHONE ENCOUNTER
Reason for Call: medication refill    Do you use a [facility]?  No  Name of the pharmacy and phone number for the current request: [facility], Phone: [phone]    Name of the medication requested: gabapentin (NEURONTIN) 300 MG capsule     Other request: Pt wants to re-continue med for back pain asap.     Can we leave a detailed message on this number? Yes    Phone number patient can be reached at: Home number on file [phone] (home)    Best Time: anytime    Call taken on 2/5/2021 at 4:07 PM by Madhu Donis

## 2021-06-15 NOTE — TELEPHONE ENCOUNTER
RN cannot approve Refill Request    RN can NOT refill this medication med is not covered by policy/route to provider. Last office visit: 2/5/2020 Mahad Morales MD Last Physical: 9/13/2017 Last MTM visit: Visit date not found Last visit same specialty: 5/19/2020 Elva Kong MD.  Next visit within 3 mo: Visit date not found  Next physical within 3 mo: Visit date not found      Kristy York, [facility] Triage/Med Refill 2/20/2021    Requested Prescriptions   Pending Prescriptions Disp Refills     QUEtiapine (SEROQUEL) 400 MG tablet [Pharmacy Med Name: QUEtiapine Fumarate Oral Tablet 400 MG] 30 tablet 0     Sig: TAKE ONE TABLET BY MOUTH AT BEDTIME       There is no refill protocol information for this order

## 2021-06-15 NOTE — TELEPHONE ENCOUNTER
"Called and discussed with the patient and his wife.  His pharmacist had recommended gabapentin but on his chart review he did try gabapentin in the past.  He thinks it may have been the \"new technology\" gabapentin which I assume would be Lyrica.  We reviewed the risks and benefits of the medication and he would like to try it.  Prescribed today and then I am going to follow-up with him at a phone visit in a couple of days to reevaluate.  "

## 2021-06-15 NOTE — TELEPHONE ENCOUNTER
Rosie (wife) call to inform that she got a call from [facility] that this prescription was denied. I inform her that we just submitted the PA and it takes 14 days to process. She would like to be notify when we hear back.

## 2021-06-15 NOTE — TELEPHONE ENCOUNTER
Vasile Pfeiffer, this is a really difficult patient-he has poorly controlled mental illness.  Either bipolar disorder or schizoaffective disorder.  He has a lot of paranoia and delusions.  Please see my recent clinic notes for details.  He does not like doing injections and needle pokes so we had switched to the alternate method of blood sugar monitoring but apparently as you can see below the insurance will not cover.  I put in a diabetes education consult.  I think over the telephone would be the best.  Could you call and just mainly talk with them about options for blood sugar monitoring that would be affordable or covered by insurance.  They are also interested in alternative ways of getting insulin rather than doing injections.  I appreciate you just talking through the options with them.  I almost always talk with the patient and his wife because the patient is difficult to have a meaningful conversation with given his delusional and paranoid thinking and manic symptoms. Thanks.    Saulo

## 2021-06-15 NOTE — TELEPHONE ENCOUNTER
RN cannot approve Refill Request    RN can NOT refill this medication Protocol failed and NO refill given and early request. Last office visit: 3/9/2021 Mahad Morales MD Last Physical: 9/13/2017 Last MTM visit: Visit date not found Last visit same specialty: 3/9/2021 Mahad Morales MD.  Next visit within 3 mo: Visit date not found  Next physical within 3 mo: Visit date not found      Gerber SHUNSadaf Ruiz, [facility] Triage/Med Refill 3/15/2021    Requested Prescriptions   Pending Prescriptions Disp Refills     lisinopriL (PRINIVIL,ZESTRIL) 40 MG tablet [Pharmacy Med Name: Lisinopril Oral Tablet 40 MG] 90 tablet 0     Sig: Take 1 tablet (40 mg total) by mouth daily.       Ace Inhibitors Refill Protocol Failed - 3/15/2021  2:00 AM        Failed - Serum Potassium in past 12 months     No results found for: LN-POTASSIUM          Failed - Serum Creatinine in past 12 months     Creatinine   Date Value Ref Range Status   12/31/2019 0.89 0.70 - 1.30 mg/dL Final             Passed - PCP or prescribing provider visit in past 12 months       Last office visit with prescriber/PCP: 3/9/2021 Mahad Morales MD OR same dept: 3/9/2021 Mahad Morales MD OR same specialty: 3/9/2021 Mahad Morales MD  Last physical: 9/13/2017 Last MTM visit: Visit date not found   Next visit within 3 mo: Visit date not found  Next physical within 3 mo: Visit date not found  Prescriber OR PCP: Mahad Morales MD  Last diagnosis associated with med order: 1. Essential hypertension  - lisinopriL (PRINIVIL,ZESTRIL) 40 MG tablet [Pharmacy Med Name: Lisinopril Oral Tablet 40 MG]; Take 1 tablet (40 mg total) by mouth daily.  Dispense: 90 tablet; Refill: 0  - atenoloL (TENORMIN) 50 MG tablet; TAKE ONE TABLET BY MOUTH EVERY NIGHT AT BEDTIME.  Dispense: 90 tablet; Refill: 3    2. Coronary artery disease  - isosorbide mononitrate (IMDUR) 30 MG 24 hr tablet [Pharmacy Med Name: Isosorbide Mononitrate ER Oral Tablet Extended Release 24 Hour 30 MG]; Take 1 tablet  (30 mg total) by mouth daily.  Dispense: 90 tablet; Refill: 0    If protocol passes may refill for 12 months if within 3 months of last provider visit (or a total of 15 months).             Passed - Blood pressure filed in past 12 months     BP Readings from Last 1 Encounters:   03/09/21 136/62                isosorbide mononitrate (IMDUR) 30 MG 24 hr tablet [Pharmacy Med Name: Isosorbide Mononitrate ER Oral Tablet Extended Release 24 Hour 30 MG] 90 tablet 0     Sig: Take 1 tablet (30 mg total) by mouth daily.       Isosorbide Refill Protocol Passed - 3/15/2021  2:00 AM        Passed - Visit with PCP or prescribing provider visit in last 6 months or next 3 months     Last office visit with prescriber/PCP: 3/9/2021 OR same dept: 3/9/2021 Mahad Morales MD OR same specialty: 3/9/2021 Mahad Morales MD Last physical: Visit date not found Last MTM visit: Visit date not found     Next appt within 3 mo: Visit date not found  Next physical within 3 mo: Visit date not found  Prescriber OR PCP: Mahad Morales MD  Last diagnosis associated with med order: 1. Essential hypertension  - lisinopriL (PRINIVIL,ZESTRIL) 40 MG tablet [Pharmacy Med Name: Lisinopril Oral Tablet 40 MG]; Take 1 tablet (40 mg total) by mouth daily.  Dispense: 90 tablet; Refill: 0  - atenoloL (TENORMIN) 50 MG tablet; TAKE ONE TABLET BY MOUTH EVERY NIGHT AT BEDTIME.  Dispense: 90 tablet; Refill: 3    2. Coronary artery disease  - isosorbide mononitrate (IMDUR) 30 MG 24 hr tablet [Pharmacy Med Name: Isosorbide Mononitrate ER Oral Tablet Extended Release 24 Hour 30 MG]; Take 1 tablet (30 mg total) by mouth daily.  Dispense: 90 tablet; Refill: 0    If protocol passes may refill for 6 months if within 3 months of last provider visit (or a total of 9 months).              Passed - Blood pressure filed in past 12 months     BP Readings from Last 1 Encounters:   03/09/21 136/62              Signed Prescriptions Disp Refills    atenoloL (TENORMIN) 50 MG tablet 90  tablet 3     Sig: TAKE ONE TABLET BY MOUTH EVERY NIGHT AT BEDTIME.       Beta-Blockers Refill Protocol Passed - 3/15/2021  2:00 AM        Passed - PCP or prescribing provider visit in past 12 months or next 3 months     Last office visit with prescriber/PCP: 3/9/2021 Mahad Morales MD OR same dept: 3/9/2021 Mahad Morales MD OR same specialty: 3/9/2021 Mahad Morales MD  Last physical: 9/13/2017 Last MTM visit: Visit date not found   Next visit within 3 mo: Visit date not found  Next physical within 3 mo: Visit date not found  Prescriber OR PCP: Mahad Morales MD  Last diagnosis associated with med order: 1. Essential hypertension  - lisinopriL (PRINIVIL,ZESTRIL) 40 MG tablet [Pharmacy Med Name: Lisinopril Oral Tablet 40 MG]; Take 1 tablet (40 mg total) by mouth daily.  Dispense: 90 tablet; Refill: 0  - atenoloL (TENORMIN) 50 MG tablet; TAKE ONE TABLET BY MOUTH EVERY NIGHT AT BEDTIME.  Dispense: 90 tablet; Refill: 3    2. Coronary artery disease  - isosorbide mononitrate (IMDUR) 30 MG 24 hr tablet [Pharmacy Med Name: Isosorbide Mononitrate ER Oral Tablet Extended Release 24 Hour 30 MG]; Take 1 tablet (30 mg total) by mouth daily.  Dispense: 90 tablet; Refill: 0    If protocol passes may refill for 12 months if within 3 months of last provider visit (or a total of 15 months).             Passed - Blood pressure filed in past 12 months     BP Readings from Last 1 Encounters:   03/09/21 136/62

## 2021-06-15 NOTE — TELEPHONE ENCOUNTER
Patient called back wanting status on medication request.  Dr. locke will call him shortly.    Юлия Lopez  02/08/2021

## 2021-06-15 NOTE — TELEPHONE ENCOUNTER
Prior Authorization Request  Who s requesting:  Insurance  Pharmacy Name and Location: [facility] #[id number] [place] [address] [[place]][address]  Medication Name: FREESTYLE SALUD 14 DAY SENSOR Kit  Insurance Plan: MEDICARE  Insurance Member ID Number:    [employer]: ID [id number]  MEDICARE: [id number]  CoverMyMeds Key: N/A  Informed patient that prior authorizations can take up to 10 business days for response:   Yes  Okay to leave a detailed message: Yes

## 2021-06-15 NOTE — TELEPHONE ENCOUNTER
Central PA team  [phone]  Pool: HE PA MED ([id number])          PA has been initiated.       PA form completed and faxed insurance via Cover My Meds     Dash:  MAHNAZ PARIS (Dash: [id number])      Medication:  FREESTYLE SALUD 14 DAY SENSOR Kit    Insurance:  UCARE        Response will be received via fax and may take up to 5-10 business days depending on plan

## 2021-06-15 NOTE — PROGRESS NOTES
ASSESMENT AND PLAN:  Diagnoses and all orders for this visit:    Type 2 diabetes mellitus with hyperosmolarity without coma, unspecified whether long term insulin use (H)  Patient has questions about insulin pumps and other technology that are less invasive than the daily shots which she has grown tired of.  It has been over a year since he has been into clinic for diabetes follow-up and his last A1c was just over a year ago.  Because of combination of concerns about COVID-19 and the way he behaves around other people and can be very aggressive and say intimidating and insulting things he has not been going to clinics, he had been fired from clinics previously.  I am happy to refer him to endocrinology but again feel like his bipolar disorder needs to be under better control before he would be able to have a meaningful visit.    Bipolar Disorder  Uncontrolled.  See previous notes for details, this has been wildly out of control over the last few months resulting in difficulty with the patient being able to get care and difficulty with day-to-day activities.  He refuses psychiatry consultation.  He does take his quetiapine at night and gets good nighttime control of his symptoms and is sleeping well.  However, daytime symptoms are an issue.  We had talked about using Invega but at this point the patient is refusing that.  I have talked with the patient's wife extensively about my concerns that he will not be able to get his other medical issues under control and get the appropriate care unless he gets the bipolar under control.    Neuropathic pain  Patient has interest in some technology that he saw on TV about implantable pain devices.  He did call his insurance and got some numbers he can call and is going to be scheduling an appointment with the pain specialist.      Reviewed the risks and benefits of the treatment plan with the patient and/or caregiver and we discussed indications for routine and emergent  follow-up.    Telephone visit duration: 11 minutes.    SUBJECTIVE: 71-year-old male with chronic pain.  No changes in pain pattern.  He continues to sleep well but during the day is very difficult for his wife to help manage.  He gets paranoid ideas and has a lot of perseveration.  He continues to take quetiapine at bedtime but refuses to take other medication, has been on multiple medication trials in the past for me and refuses to see a psychiatrist, see previous notes for details.  He expresses concern about his daily insulin injections.  It burns and is discomforting and he is wondering about other ways of getting insulin.    Past Medical History:   Diagnosis Date     Arthritis      Asthma      Bipolar affective disorder (H)      BPH (benign prostatic hyperplasia) 01/06/2015     CAD (coronary artery disease) 07/20/2015     Diabetes mellitus (H)      Dyslipidemia, goal LDL below 70 07/20/2015     Eczema      Essential hypertension      Tardive dyskinesia      Patient Active Problem List   Diagnosis     Subacute dyskinesia due to drug     Dyslipidemia, goal LDL below 70     Essential hypertension     Type 2 diabetes mellitus (H)     Bipolar Disorder     Enlarged prostate with urinary obstruction     Coronary artery disease     Erectile dysfunction     Gait abnormality     Gastroesophageal reflux disease without esophagitis     Chronic back pain     Neuropathic pain     Primary osteoarthritis involving multiple joints     Obesity (BMI 35.0-39.9) with comorbidity (H)     Current Outpatient Medications   Medication Sig Dispense Refill     acetaminophen (TYLENOL) 500 MG tablet Take 500 mg by mouth 2 (two) times a day.       aspirin 81 MG EC tablet Take 81 mg by mouth daily.       atenoloL (TENORMIN) 50 MG tablet TAKE ONE TABLET BY MOUTH EVERY NIGHT AT BEDTIME 90 tablet 3     atorvastatin (LIPITOR) 20 MG tablet Take 1 tablet by mouth daily.  90 tablet 3     blood glucose meter (GLUCOMETER) OneTouch Ultra. Dispense  glucometer brand per patient's insurance at pharmacy discretion. Pt testing 3 times per day. 1 each 0     cholecalciferol, vitamin D3, 1,000 unit tablet Take 2,000 Units by mouth every other day. Takes at 1200       diclofenac sodium (VOLTAREN) 1 % Gel Apply 4 GRAMS three times a day as needed TO RIGHT KNEE 100 g 4     flash glucose scanning reader (FREESTYLE SALUD 14 DAY READER) Misc Use 1 Units As Directed every 8 (eight) hours. 6 each 6     FREESTYLE SALUD 14 DAY SENSOR Kit Use 1 Units As Directed every 14 (fourteen) days. 1 kit 11     generic lancets Dispense brand per patient's insurance at pharmacy discretion. TESTS 3 times per day 350 each 3     insulin needles, disposable, 31 Ndle 3 (three) times a day. For insulin injections.       insulin NPH-insulin regular (NOVOLIN 70/30 U-100 INSULIN) 100 unit/mL (70-30) injection Inject 85-90 Units under the skin 2 (two) times a day before meals. 50 mL 11     insulin syringe-needle U-100 1 mL 31 gauge x 5/16 Syrg USE AS DIRECTED ONCE DAILY 100 each 2     isosorbide mononitrate (IMDUR) 30 MG 24 hr tablet Take 1 tablet (30 mg total) by mouth daily. 90 tablet 2     lisinopriL (PRINIVIL,ZESTRIL) 40 MG tablet Take 1 tablet (40 mg total) by mouth daily. 90 tablet 3     metFORMIN (GLUCOPHAGE) 1000 MG tablet TAKE ONE TABLET BY MOUTH 2 TIMES A DAY WITH MEALS. 180 tablet 3     nitroglycerin (NITROSTAT) 0.4 MG SL tablet PLACE ONE TABLET UNDER THE TONGUE EVERY 5 MINUTES AS NEEDED FOR CHEST PAIN.  MAXIMUM DOSE OF 3 TABLETS IN 15 MINUTES. 25 tablet 3     omeprazole (PRILOSEC) 20 MG capsule Take 1 capsule (20 mg total) by mouth daily before breakfast. 90 capsule 3     ONETOUCH ULTRA BLUE TEST STRIP strips TEST 3 times per day. 350 strip 3     pregabalin (LYRICA) 50 MG capsule Take 1 capsule (50 mg total) by mouth 2 (two) times a day. 60 capsule 1     QUEtiapine (SEROQUEL) 100 MG tablet Take 1-3 tablets (100-300 mg total) by mouth every morning. Continue to take 400 mg at bedtime  (seperate rx) 90 tablet 11     QUEtiapine (SEROQUEL) 400 MG tablet TAKE ONE TABLET BY MOUTH AT BEDTIME  30 tablet 11     tamsulosin (FLOMAX) 0.4 mg cap Take 1 capsule (0.4 mg total) by mouth daily. 90 capsule 3     No current facility-administered medications for this visit.      Social History     Tobacco Use   Smoking Status Never Smoker   Smokeless Tobacco Never Used   Tobacco Comment    no passive exposure       OBJECTICE: There were no vitals taken for this visit.     No results found for this or any previous visit (from the past 24 hour(s)).      Mahad Morales

## 2021-06-15 NOTE — PROGRESS NOTES
Mahad Orellana is a 71 y.o. male who is being evaluated via a billable telephone visit.      What phone number would you like to be contacted at? [phone]        ASSESMENT AND PLAN:  Diagnoses and all orders for this visit:    Bipolar Disorder  Continues to do well at night sleeping through the night without disruptions which is a big improvement on previously.  However, continues to do poorly during the daytime with racing thoughts, pressured speech, extensive paranoid and delusional thinking.  See previous notes for details.  Patient continues to refuse additional medication, we have tried multiple trials of additional medication in the past.  Patient continues to refuse psychiatry consultation.  We will check in with him again with a phone visit again in about 2-3 weeks.    Neuropathic pain, Chronic midline low back pain without sciatica  Counseled the patient that I had referred him to the pain specialist back in December and that if he wants to learn more about implantable pain control options he needs to make a follow-up appointment with the pain specialist and make clear to them that he is not interested in injections but instead wants to get a consult regarding implantable pain control devices.   counseled the patient that as a primary care physician I am not aware of the details and options around these devices.  We did decide to start Lyrica, which has not been tried in the past, and so far he has tolerated the medication well and he was encouraged to continue it even though he has not yet noticed any improvement in his pain.  We will recheck with him in 2 to 3 weeks as detailed above.      Reviewed the risks and benefits of the treatment plan with the patient and/or caregiver and we discussed indications for routine and emergent follow-up.        SUBJECTIVE: 71-year-old male did start Lyrica this week, has not noticed any side effects or problems but also has not noticed any improvement in his severe  "chronic pain.  Patient reports pain all over his body, especially in the back.  He continues to be focused on implantable pain control devices that he had seen TV commercials for.  His wife reports and the patient confirms that his nights have continued to be good, he is sleeping through the night without the disruptive behavior that had been going on previously and he takes his quetiapine at bedtime and says \"this is the best medication that has ever worked for me.\"  However, he is not taking anything during the daytime and is having significant ongoing problems related to paranoid and delusional thinking and pressured speech and disruptive behavior.      Past Medical History:   Diagnosis Date     Arthritis      Asthma      Bipolar affective disorder (H)      BPH (benign prostatic hyperplasia) 01/06/2015     CAD (coronary artery disease) 07/20/2015     Diabetes mellitus (H)      Dyslipidemia, goal LDL below 70 07/20/2015     Eczema      Essential hypertension      Tardive dyskinesia      Patient Active Problem List   Diagnosis     Subacute dyskinesia due to drug     Dyslipidemia, goal LDL below 70     Essential hypertension     Type 2 diabetes mellitus (H)     Bipolar Disorder     Enlarged prostate with urinary obstruction     Coronary artery disease     Erectile dysfunction     Gait abnormality     Gastroesophageal reflux disease without esophagitis     Chronic back pain     Neuropathic pain     Primary osteoarthritis involving multiple joints     Obesity (BMI 35.0-39.9) with comorbidity (H)     Current Outpatient Medications   Medication Sig Dispense Refill     acetaminophen (TYLENOL) 500 MG tablet Take 500 mg by mouth 2 (two) times a day.       aspirin 81 MG EC tablet Take 81 mg by mouth daily.       atenoloL (TENORMIN) 50 MG tablet TAKE ONE TABLET BY MOUTH EVERY NIGHT AT BEDTIME 90 tablet 3     atorvastatin (LIPITOR) 20 MG tablet Take 1 tablet by mouth daily.  90 tablet 3     blood glucose meter (GLUCOMETER) " OneTouch Ultra. Dispense glucometer brand per patient's insurance at pharmacy discretion. Pt testing 3 times per day. 1 each 0     cholecalciferol, vitamin D3, 1,000 unit tablet Take 2,000 Units by mouth every other day. Takes at 1200       diclofenac sodium (VOLTAREN) 1 % Gel Apply 4 GRAMS three times a day as needed TO RIGHT KNEE 100 g 4     flash glucose scanning reader (FREESTYLE SALUD 14 DAY READER) Misc Use 1 Units As Directed every 8 (eight) hours. 6 each 6     FREESTYLE SALUD 14 DAY SENSOR Kit Use 1 Units As Directed every 14 (fourteen) days. 1 kit 11     generic lancets Dispense brand per patient's insurance at pharmacy discretion. TESTS 3 times per day 350 each 3     insulin needles, disposable, 31 Ndle 3 (three) times a day. For insulin injections.       insulin NPH-insulin regular (NOVOLIN 70/30 U-100 INSULIN) 100 unit/mL (70-30) injection Inject 85-90 Units under the skin 2 (two) times a day before meals. 50 mL 11     insulin syringe-needle U-100 1 mL 31 gauge x 5/16 Syrg USE AS DIRECTED ONCE DAILY 100 each 2     isosorbide mononitrate (IMDUR) 30 MG 24 hr tablet Take 1 tablet (30 mg total) by mouth daily. 90 tablet 2     lisinopriL (PRINIVIL,ZESTRIL) 40 MG tablet Take 1 tablet (40 mg total) by mouth daily. 90 tablet 3     metFORMIN (GLUCOPHAGE) 1000 MG tablet TAKE ONE TABLET BY MOUTH 2 TIMES A DAY WITH MEALS. 180 tablet 3     nitroglycerin (NITROSTAT) 0.4 MG SL tablet PLACE ONE TABLET UNDER THE TONGUE EVERY 5 MINUTES AS NEEDED FOR CHEST PAIN.  MAXIMUM DOSE OF 3 TABLETS IN 15 MINUTES. 25 tablet 3     omeprazole (PRILOSEC) 20 MG capsule Take 1 capsule (20 mg total) by mouth daily before breakfast. 90 capsule 3     ONETOUCH ULTRA BLUE TEST STRIP strips TEST 3 times per day. 350 strip 3     pregabalin (LYRICA) 50 MG capsule Take 1 capsule (50 mg total) by mouth 2 (two) times a day. 60 capsule 1     QUEtiapine (SEROQUEL) 100 MG tablet Take 1-3 tablets (100-300 mg total) by mouth every morning. Continue to  take 400 mg at bedtime (seperate rx) 90 tablet 11     QUEtiapine (SEROQUEL) 400 MG tablet Take 1 tablet (400 mg total) by mouth at bedtime. 30 tablet 11     tamsulosin (FLOMAX) 0.4 mg cap Take 1 capsule (0.4 mg total) by mouth daily. 90 capsule 3     No current facility-administered medications for this visit.      Social History     Tobacco Use   Smoking Status Never Smoker   Smokeless Tobacco Never Used   Tobacco Comment    no passive exposure       OBJECTICE: There were no vitals taken for this visit.     No results found for this or any previous visit (from the past 24 hour(s)).    Psychiatric-speech is very pressured with tangential thinking and delusional thinking.  No suicidal or homicidal ideation.  Judgment and insight are limited by his poorly controlled mental illness.      Mahad Morales            Phone call duration: 14 minutes

## 2021-06-15 NOTE — TELEPHONE ENCOUNTER
Refill Approved    Rx renewed per Medication Renewal Policy. Medication was last renewed on 2/24/20.    Gerber Ruiz, [facility] Triage/Med Refill 3/15/2021     Requested Prescriptions   Pending Prescriptions Disp Refills     lisinopriL (PRINIVIL,ZESTRIL) 40 MG tablet [Pharmacy Med Name: Lisinopril Oral Tablet 40 MG] 90 tablet 0     Sig: Take 1 tablet (40 mg total) by mouth daily.       Ace Inhibitors Refill Protocol Failed - 3/15/2021  2:00 AM        Failed - Serum Potassium in past 12 months     No results found for: LN-POTASSIUM          Failed - Serum Creatinine in past 12 months     Creatinine   Date Value Ref Range Status   12/31/2019 0.89 0.70 - 1.30 mg/dL Final             Passed - PCP or prescribing provider visit in past 12 months       Last office visit with prescriber/PCP: 3/9/2021 Mahad Morales MD OR same dept: 3/9/2021 Mahad Morales MD OR same specialty: 3/9/2021 Mahad Morales MD  Last physical: 9/13/2017 Last MTM visit: Visit date not found   Next visit within 3 mo: Visit date not found  Next physical within 3 mo: Visit date not found  Prescriber OR PCP: Mahad Morales MD  Last diagnosis associated with med order: 1. Essential hypertension  - lisinopriL (PRINIVIL,ZESTRIL) 40 MG tablet [Pharmacy Med Name: Lisinopril Oral Tablet 40 MG]; Take 1 tablet (40 mg total) by mouth daily.  Dispense: 90 tablet; Refill: 0  - atenoloL (TENORMIN) 50 MG tablet [Pharmacy Med Name: Atenolol Oral Tablet 50 MG]; TAKE ONE TABLET BY MOUTH EVERY NIGHT AT BEDTIME  Dispense: 90 tablet; Refill: 0    2. Coronary artery disease  - isosorbide mononitrate (IMDUR) 30 MG 24 hr tablet [Pharmacy Med Name: Isosorbide Mononitrate ER Oral Tablet Extended Release 24 Hour 30 MG]; Take 1 tablet (30 mg total) by mouth daily.  Dispense: 90 tablet; Refill: 0    If protocol passes may refill for 12 months if within 3 months of last provider visit (or a total of 15 months).             Passed - Blood pressure filed in past 12 months      BP Readings from Last 1 Encounters:   03/09/21 136/62                isosorbide mononitrate (IMDUR) 30 MG 24 hr tablet [Pharmacy Med Name: Isosorbide Mononitrate ER Oral Tablet Extended Release 24 Hour 30 MG] 90 tablet 0     Sig: Take 1 tablet (30 mg total) by mouth daily.       Isosorbide Refill Protocol Passed - 3/15/2021  2:00 AM        Passed - Visit with PCP or prescribing provider visit in last 6 months or next 3 months     Last office visit with prescriber/PCP: 3/9/2021 OR same dept: 3/9/2021 Mahad Morales MD OR same specialty: 3/9/2021 Mahad Morales MD Last physical: Visit date not found Last MTM visit: Visit date not found     Next appt within 3 mo: Visit date not found  Next physical within 3 mo: Visit date not found  Prescriber OR PCP: Mahad Morales MD  Last diagnosis associated with med order: 1. Essential hypertension  - lisinopriL (PRINIVIL,ZESTRIL) 40 MG tablet [Pharmacy Med Name: Lisinopril Oral Tablet 40 MG]; Take 1 tablet (40 mg total) by mouth daily.  Dispense: 90 tablet; Refill: 0  - atenoloL (TENORMIN) 50 MG tablet [Pharmacy Med Name: Atenolol Oral Tablet 50 MG]; TAKE ONE TABLET BY MOUTH EVERY NIGHT AT BEDTIME  Dispense: 90 tablet; Refill: 0    2. Coronary artery disease  - isosorbide mononitrate (IMDUR) 30 MG 24 hr tablet [Pharmacy Med Name: Isosorbide Mononitrate ER Oral Tablet Extended Release 24 Hour 30 MG]; Take 1 tablet (30 mg total) by mouth daily.  Dispense: 90 tablet; Refill: 0    If protocol passes may refill for 6 months if within 3 months of last provider visit (or a total of 9 months).              Passed - Blood pressure filed in past 12 months     BP Readings from Last 1 Encounters:   03/09/21 136/62                atenoloL (TENORMIN) 50 MG tablet [Pharmacy Med Name: Atenolol Oral Tablet 50 MG] 90 tablet 0     Sig: TAKE ONE TABLET BY MOUTH EVERY NIGHT AT BEDTIME.       Beta-Blockers Refill Protocol Passed - 3/15/2021  2:00 AM        Passed - PCP or prescribing provider  visit in past 12 months or next 3 months     Last office visit with prescriber/PCP: 3/9/2021 Mahad Morales MD OR same dept: 3/9/2021 Mahad Morales MD OR same specialty: 3/9/2021 Mahad Morales MD  Last physical: 9/13/2017 Last MTM visit: Visit date not found   Next visit within 3 mo: Visit date not found  Next physical within 3 mo: Visit date not found  Prescriber OR PCP: Mahad Morales MD  Last diagnosis associated with med order: 1. Essential hypertension  - lisinopriL (PRINIVIL,ZESTRIL) 40 MG tablet [Pharmacy Med Name: Lisinopril Oral Tablet 40 MG]; Take 1 tablet (40 mg total) by mouth daily.  Dispense: 90 tablet; Refill: 0  - atenoloL (TENORMIN) 50 MG tablet [Pharmacy Med Name: Atenolol Oral Tablet 50 MG]; TAKE ONE TABLET BY MOUTH EVERY NIGHT AT BEDTIME  Dispense: 90 tablet; Refill: 0    2. Coronary artery disease  - isosorbide mononitrate (IMDUR) 30 MG 24 hr tablet [Pharmacy Med Name: Isosorbide Mononitrate ER Oral Tablet Extended Release 24 Hour 30 MG]; Take 1 tablet (30 mg total) by mouth daily.  Dispense: 90 tablet; Refill: 0    If protocol passes may refill for 12 months if within 3 months of last provider visit (or a total of 15 months).             Passed - Blood pressure filed in past 12 months     BP Readings from Last 1 Encounters:   03/09/21 136/62

## 2021-06-16 PROBLEM — F25.0 SCHIZOAFFECTIVE DISORDER, BIPOLAR TYPE (H): Status: ACTIVE | Noted: 2021-03-09

## 2021-06-16 PROBLEM — G89.29 CHRONIC BACK PAIN: Status: ACTIVE | Noted: 2017-10-26

## 2021-06-16 PROBLEM — M54.50 CHRONIC BILATERAL LOW BACK PAIN WITHOUT SCIATICA: Status: ACTIVE | Noted: 2021-06-02

## 2021-06-16 PROBLEM — M79.2 NEUROPATHIC PAIN: Status: ACTIVE | Noted: 2018-12-26

## 2021-06-16 PROBLEM — E66.01 MORBID OBESITY (H): Status: ACTIVE | Noted: 2020-10-27

## 2021-06-16 PROBLEM — M54.9 CHRONIC BACK PAIN: Status: ACTIVE | Noted: 2017-10-26

## 2021-06-16 PROBLEM — M15.0 PRIMARY OSTEOARTHRITIS INVOLVING MULTIPLE JOINTS: Status: ACTIVE | Noted: 2019-07-29

## 2021-06-16 PROBLEM — G89.29 CHRONIC BILATERAL LOW BACK PAIN WITHOUT SCIATICA: Status: ACTIVE | Noted: 2021-06-02

## 2021-06-16 NOTE — TELEPHONE ENCOUNTER
Prior Authorization Request  Who s requesting:  INSURANCE  Pharmacy Name and Location: [id number] - [place] [[place]], [address]  Medication Name: paliperidone palmitate IM injection 234 mg (INVEGA SUSTENNA)  Insurance Plan: [employer]/MEDICARE   Insurance Member ID Number:  [id number]  CoverMyMeds Key: N/A  Informed patient that prior authorizations can take up to 10 business days for response:   No  Okay to leave a detailed message: No

## 2021-06-16 NOTE — TELEPHONE ENCOUNTER
Telephone Encounter by Gita Arenas at 9/16/2019  8:34 AM     Author: Gita Arenas Service: -- Author Type: --    Filed: 9/16/2019  8:35 AM Encounter Date: 9/9/2019 Status: Signed    : Gita Arenas APPROVED:    Approval start date:08/12/2019  Approval end date:09/10/2020    Pharmacy has been notified of approval and will contact patient when medication is ready for pickup.

## 2021-06-16 NOTE — TELEPHONE ENCOUNTER
Left message for pt to get scheduled for AWV.  Please assist in scheduling, when call is returned.    Thank you.

## 2021-06-16 NOTE — TELEPHONE ENCOUNTER
I called and left a voice message for the patient that he should not stress about the pill, that we will take care of it, we will contact him again later today or tomorrow.

## 2021-06-16 NOTE — TELEPHONE ENCOUNTER
Pt received a bill and was told that they could get it written off if Dr. Morales puts on a prior Auth on the injection from last visit. Pt would like Dr. Morales to do this and call them back as soon as it is done.

## 2021-06-16 NOTE — TELEPHONE ENCOUNTER
The PA team does not complete prior authorizations for medications administered in the clinic.  Those prior authorizations need to be completed by the clinic staff.

## 2021-06-16 NOTE — TELEPHONE ENCOUNTER
I called Radha to discuss initiation of a PA. Representative states that a PA is not required for the [employer] per rep review. The rep states that $612.69 went toward the patient's maximum out-of-pocket expenses for the year (medication is $5000 total). Patient likely received explanation of benefits letter. The $612.69 is owed by the patient. The representative states that if the medication will be received by the patient regularly, the medication will be covered at 100% once his out of pocket expenses are met. Representative states that patient has used $672.65 of the total $3000 out of pocket to be paid in a calendar year. The [employer] is 20% member responsibility per his plan (Medicare plan).

## 2021-06-16 NOTE — TELEPHONE ENCOUNTER
Discussed with patient's wife.  Medication is being discontinued, he did not tolerate the medication well.  I discussed with her clinic manager and we are eliminating the charges sent to the patient.

## 2021-06-16 NOTE — TELEPHONE ENCOUNTER
Please call patient to schedule their initial DSME visit (phone).     Thanks  Margarette Knight RDN, RADHA  Diabetes Care and Education

## 2021-06-16 NOTE — TELEPHONE ENCOUNTER
Refill Approved    Rx renewed per Medication Renewal Policy. Medication was last renewed on 2/24/20.    Gerber Ruiz, [facility] Triage/Med Refill 4/5/2021     Requested Prescriptions   Pending Prescriptions Disp Refills     omeprazole (PRILOSEC) 20 MG capsule [Pharmacy Med Name: Omeprazole Oral Capsule Delayed Release 20 MG] 90 capsule 0     Sig: Take 1 capsule (20 mg total) by mouth daily before breakfast.       GI Medications Refill Protocol Passed - 4/4/2021  2:01 AM        Passed - PCP or prescribing provider visit in last 12 or next 3 months.     Last office visit with prescriber/PCP: 3/9/2021 Mahad Morales MD OR same dept: Visit date not found OR same specialty: Visit date not found  Last physical: 9/13/2017 Last MTM visit: 3/28/2018 Jose Manuel Pack, PharmD   Next visit within 3 mo: Visit date not found  Next physical within 3 mo: Visit date not found  Prescriber OR PCP: Mahad Morales MD  Last diagnosis associated with med order: 1. Gastroesophageal reflux disease  - omeprazole (PRILOSEC) 20 MG capsule [Pharmacy Med Name: Omeprazole Oral Capsule Delayed Release 20 MG]; Take 1 capsule (20 mg total) by mouth daily before breakfast.  Dispense: 90 capsule; Refill: 0    If protocol passes may refill for 12 months if within 3 months of last provider visit (or a total of 15 months).

## 2021-06-17 NOTE — PROGRESS NOTES
MTM Encounter    Assessment/Plan  Medication Adherence: Excellent, no issues identified.  Prescriptions are largely managed by his wife Rosie.    Diabetes: Uncontrolled to goal of less than 8%.  Switching his bipolar medication from Haldol to quetiapine has been very beneficial for Saulo's sleep, anxiety, and bipolar management.  However it has resulted in a significant increase in his blood sugar and his weight may be trending up slightly.  We will increase his doses of N and R insulin by modest amounts today.  I will also switch him to Novolin 70/30-which is a mix of N and R insulin and titrate his dose up slightly again at that time.  I am having him follow-up in 2 weeks for a more aggressive adjustment.  I did not want to increase his insulin dose to much as we were switching the ratio.  -Increase insulin to 40 units of NPH and 20 units of regular insulin in the morning, 35 units of regular at lunch, and 38 units of NPH before bed.  -switch to 70/30, 65 units in the morning, 40 units with lunch, and 30 units with dinner once N or R insulin runs out    Hypertension: Controlled to goal of less than 140/90.  Still has some of the 20mg tablets and using them up before switching to one 40mg tablet daily.  -Continue current therapy    Dyslipidemia: Saulo has switched from simvastatin to atorvastatin.  No side effects noted.  Plan to check fasting lipids at next visit.  Counseled on the importance of coming in for a fasting cholesterol check, I would like to get an accurate measurement of his triglycerides since starting quetiapine.  -Lipid cascade at next appointment    Supplements: Has stopped all supplements.  He notes no changes since stopping his supplements.  He is satisfied with the reduction in pill count.  He estimates that he is saving $100-$150 per month on medications.  -Continue current therapy    Follow Up  2 weeks for insulin adjustment and fasting lipid cascade      Subjective/Objective  Mahad Orellana is a  68 y.o. male here for an MTM appointment to review his medications.    Medication Adherence: Rosie manages all of Saulo's medications.  She places his medications in the pillbox for him and insurance that he takes them.  She also keeps a very detailed and up-to-date medication list for both Saulo and herself.    Diabetes: Saulo has been taking 35 units of NPH and 22 units of R at 11:30AM, 32 units R at 5:30 and 35 units NPH at 10pm.  He has been using a few extra units the past few days because he has noticed his blood sugars are much higher.  He has been going to the bathroom a lot more lately and is feeling dehydrated (dry mouth, thirsty, headaches). He is now using a OneTouch 2, which is reading 100 pts higher than his 15 year old Contour Next when he checks with both at the same time.  He used to get lows at night, around 10pm.  Saulo is eating three meals per day; biggest of the day is 3pm.  Saulo has 2.5 vials of N and 1.5 R at home.  Lab Results   Component Value Date    HGBA1C 10.5 (H) 04/25/2018    HGBA1C 8.1 (H) 09/13/2017    HGBA1C 8.9 (H) 05/24/2017     Lab Results   Component Value Date    MICROALBUR 0.52 11/14/2012    LDLCALC 50 12/19/2016    CREATININE 1.26 04/25/2018     Hypertension: BP elevated slightly after first check with walk from lob.  Down to 122/68 after sitting for five minutes.  Still has some of the 20mg tablets and using them up before switching to one 40mg tablet daily.    Bipolar: Saulo is very happy with seroquel despite the metabolic side effects so far.  He does not want to change his medication as he is sleeping well, his anxiety is controlled, and his bipolar is doing well.    Dyslipidemia: Saulo has switched from simvastatin to atorvastatin.  No side effects noted.     Supplements: Has stopped all supplements.  He notes no changes since stopping his supplements.  He is satisfied with the reduction in pill count.  He estimates that he is saving $100-$150 per month on  medications.    ----------------  Allergies/ADRs: reviewed in EPIC   Tobacco: reviewed in EPIC   Alcohol: reviewed in EPIC   PMH: reviewed in EPIC     The patient was given a summary of these recommendations as an after visit summary.     I spent 30 minutes with this patient today; Patient is not Medicare Part D.    All changes were made via collaborative practice agreement with Mahad Morales MD.   We reviewed Mahad's medication list with them, discussing reason for use, directions for use, and potential side effects of each medication.  Indication, safety, efficacy, and convenience was assessed for all reviewed medications.    Mahad was provided with follow up instructions, and this care plan was communicated via EMR with her primary care provider, Mahad Morales MD, and is the authorizing prescriber for this visit.  Direct supervision was available by either the patient's PCP or another available physician when needed.     Jose Manuel Pack, PharmD, BCACP  Medication Management (MTM) Pharmacist  [facility]

## 2021-06-17 NOTE — PROGRESS NOTES
ASSESMENT AND PLAN:  Diagnoses and all orders for this visit:    Type 2 diabetes mellitus  -     Glycosylated Hemoglobin A1c shows poor control as expected based on his glucometer results.  Patient prefers to wait until he can meet with his MTM pharmacist next week to develop their plan to change his insulin regiment.    Coronary artery disease due to calcified coronary lesion  Stable.  Continue cardiology follow-up and medical management.  -     Comprehensive Metabolic Panel  -     LDL Cholesterol, Direct    Bipolar Disorder with subacute dyskinesia due to drug history of Haldol  The tardive dyskinesia symptoms have improved significantly since Haldol was switched to typing.  However, he is experiencing significant weight gain and hypoglycemia likely as a side effect of the quetiapine.  Patient and his wife both agree that he cannot go off of the Haldol/quetiapine family of medications because of the risk of significant deterioration in his quality of life that has occurred in the past with discontinuation of these medications.  Counseling done today with the patient on the risks and benefits of the medications and at this point the risk-benefit analysis appears to be in favor of continued quetiapine, continued non-the use of Haldol, and close follow-up.        SUBJECTIVE: 68-year-old male comes in today for follow-up.  He had been in previously with ear pain and completed his course of treatment for an ear infection.  Up until yesterday the ear pain and still been bothering him a lot but then over the last 24 hours the ear pain has resolved.  No otorrhea.  No change in his hearing.  His tremor has improved dramatically and he is ambulating much better now.  He is sleeping very well and he and his wife both feel his bipolar disorder is under good control on quetiapine and off Haldol.  Patient had one episode of chest pain a few weeks ago which relieved with nitroglycerin.  No recurrence since then.  Blood sugars  have been about 100 points higher than baseline over this last couple of months.  No recurrent vomiting.  Has been adherent to his medications.    Past Medical History:   Diagnosis Date     Arthritis      Asthma      Bipolar affective disorder      BPH (benign prostatic hyperplasia) 01/06/2015     CAD (coronary artery disease) 07/20/2015     Diabetes mellitus      Dyslipidemia, goal LDL below 70 07/20/2015     Eczema      Essential hypertension      Tardive dyskinesia      Patient Active Problem List   Diagnosis     Subacute dyskinesia due to drug     Dyslipidemia, goal LDL below 70     Essential hypertension     Type 2 diabetes mellitus     Bipolar Disorder     Enlarged prostate with urinary obstruction     Coronary artery disease     Erectile dysfunction     Gait abnormality     Gastroesophageal reflux disease without esophagitis     Chronic back pain     Current Outpatient Prescriptions   Medication Sig Dispense Refill     acetaminophen (TYLENOL) 500 MG tablet Take 500 mg by mouth 2 (two) times a day.       antipyrine-benzocaine (AURALGAN) otic solution Apply ear drops to right ear three times daily as needed 14 mL 1     aspirin 81 MG EC tablet Take 81 mg by mouth daily.       atenolol (TENORMIN) 50 MG tablet Take 1 tablet (50 mg total) by mouth at bedtime. 90 tablet 1     atorvastatin (LIPITOR) 20 MG tablet Take 1 tablet (20 mg total) by mouth daily. 90 tablet 3     blood glucose meter (GLUCOMETER) OneTouch Ultra. Dispense glucometer brand per patient's insurance at pharmacy discretion. Pt testing 3 times per day. 1 each 0     blood glucose test strips Dispense brand per patient's insurance at pharmacy discretion. TEST 3 times per day. 350 strip 3     cholecalciferol, vitamin D3, 1,000 unit tablet Take 2,000 Units by mouth every other day. Takes at 1200       clopidogrel (PLAVIX) 75 mg tablet Take 1 tablet (75 mg total) by mouth daily. 90 tablet 3     cyclobenzaprine (FLEXERIL) 5 MG tablet Take 1 tablet (5 mg  total) by mouth 3 (three) times a day as needed for muscle spasms. 90 tablet 3     DULoxetine (CYMBALTA) 60 MG capsule TAKE 1 CAPSULE (60 MG TOTAL)  BY MOUTH TWICE PER DAY. 180 capsule 3     generic lancets Dispense brand per patient's insurance at pharmacy discretion. TESTS 3 times per day 350 each 3     insulin needles, disposable, 31 Ndle 3 (three) times a day. For insulin injections.       insulin regular (HUMULIN/NOVOLIN) 100 unit/mL injection Inject under the skin. 22 units at am and 32 units in the evening       insulin syringe-needle U-100 1 mL 31 gauge x 5/16 Syrg USE AS DIRECTED ONCE DAILY 100 each 2     isosorbide mononitrate (IMDUR) 30 MG 24 hr tablet TAKE ONE TABLET BY MOUTH ONCE DAILY 90 tablet 2     lisinopril (PRINIVIL,ZESTRIL) 40 MG tablet Take 1 tablet (40 mg total) by mouth daily. 90 tablet 3     metFORMIN (GLUCOPHAGE) 1000 MG tablet Take 1 tablet (1,000 mg total) by mouth 2 (two) times a day with meals. 180 tablet 3     nitroglycerin (NITROSTAT) 0.4 MG SL tablet Place 1 tablet (0.4 mg total) under the tongue every 5 (five) minutes as needed for chest pain. Max of 3 doses in 15 minutes. 25 tablet 1     NOVOLIN N 100 unit/mL injection Inject 31 Units under the skin 2 (two) times a day before meals. (Patient taking differently: Inject under the skin 2 (two) times a day before meals. 31 units at AM and 31 units at HS) 10 mL 6     omeprazole (PRILOSEC) 20 MG capsule Take 1 capsule (20 mg total) by mouth daily before breakfast. 90 capsule 3     QUEtiapine (SEROQUEL) 300 MG tablet Take 1 tablet (300 mg total) by mouth at bedtime. 90 tablet 3     SYRING W-NDL,DISP,INSUL,0.5 ML (INSULIN SYRINGE MISC) Use As Directed.       tamsulosin (FLOMAX) 0.4 mg Cp24 Take 1 capsule (0.4 mg total) by mouth daily. 90 capsule 3     traMADol (ULTRAM) 50 mg tablet Take 1 tablet (50 mg total) by mouth every 8 (eight) hours as needed for pain. 60 tablet 1     No current facility-administered medications for this visit.   "    History   Smoking Status     Never Smoker   Smokeless Tobacco     Never Used       OBJECTICE: /72 (Patient Site: Right Arm)  Pulse 71  Temp 97.9  F (36.6  C) (Oral)   Resp 20  Ht 6' 0.75\" (1.848 m)  Wt (!) 257 lb (116.6 kg)  SpO2 97%  BMI 34.14 kg/m2     No results found for this or any previous visit (from the past 24 hour(s)).     GEN-alert, appropriate, in no apparent distress   HEENT-external auditory canals are normal and tympanic membranes appeared normal.   CV-regular rate and rhythm with no murmur   RESP-lungs clear to auscultation   Foot exam is normal bilaterally, no ulcers.    Mahad Morales          "

## 2021-06-17 NOTE — PROGRESS NOTES
OFFICE VISIT NOTE      Assessment & Plan   Mahad Orellana is a 68 y.o. male with diabetes, now has right otitis media   Treat with amox and auralgan    Diagnoses and all orders for this visit:    Right otitis media    Other orders  -     amoxicillin (AMOXIL) 875 MG tablet; Take 1 tablet (875 mg total) by mouth 2 (two) times a day for 10 days.  Dispense: 20 tablet; Refill: 0  -     antipyrine-benzocaine (AURALGAN) otic solution; Apply ear drops to right ear three times daily as needed  Dispense: 14 mL; Refill: 1        Shiela Dunbar MD    The following high BMI interventions were performed this visit: encouragement to exercise          Subjective:   Chief Complaint:  Ear Pain (x4 weeks ago)    68 y.o. male.     1) 4 weeks of right ear pain, on/off  Dr. Jacobs used to give him a solution for this  No URI symptoms  H/o abscess in his left ear canal  No change in hearing    2) frustration with getting in to see Dr. Morales    Current Outpatient Prescriptions   Medication Sig     acetaminophen (TYLENOL) 500 MG tablet Take 500 mg by mouth 2 (two) times a day.     aspirin 81 MG EC tablet Take 81 mg by mouth daily.     atenolol (TENORMIN) 50 MG tablet Take 1 tablet (50 mg total) by mouth at bedtime.     atorvastatin (LIPITOR) 20 MG tablet Take 1 tablet (20 mg total) by mouth daily.     blood glucose test strips Dispense brand per patient's insurance at pharmacy discretion. TEST 3 times per day.     blood-glucose meter (ONETOUCH VERIO IQ METER) Misc Use 1 Device As Directed 3 (three) times a day.     cholecalciferol, vitamin D3, 1,000 unit tablet Take 2,000 Units by mouth every other day. Takes at 1200     clopidogrel (PLAVIX) 75 mg tablet Take 1 tablet (75 mg total) by mouth daily.     CONTOUR NEXT STRIPS strips TEST BLOOD SUGAR UP TO FOUR TIMES A DAY AND RECORD RESULTS.     cyclobenzaprine (FLEXERIL) 5 MG tablet Take 1 tablet (5 mg total) by mouth 3 (three) times a day as needed for muscle spasms.     DULoxetine  (CYMBALTA) 60 MG capsule TAKE 1 CAPSULE (60 MG TOTAL)  BY MOUTH TWICE PER DAY.     generic lancets Dispense brand per patient's insurance at pharmacy discretion. TESTS 3 times per day     insulin needles, disposable, 31 Ndle 3 (three) times a day. For insulin injections.     insulin regular (HUMULIN/NOVOLIN) 100 unit/mL injection Inject under the skin. 22 units at am and 32 units in the evening     insulin syringe-needle U-100 1 mL 31 gauge x 5/16 Syrg USE AS DIRECTED ONCE DAILY     isosorbide mononitrate (IMDUR) 30 MG 24 hr tablet TAKE ONE TABLET BY MOUTH ONCE DAILY     lisinopril (PRINIVIL,ZESTRIL) 40 MG tablet Take 1 tablet (40 mg total) by mouth daily.     metFORMIN (GLUCOPHAGE) 1000 MG tablet Take 1 tablet (1,000 mg total) by mouth 2 (two) times a day with meals.     nitroglycerin (NITROSTAT) 0.4 MG SL tablet Place 1 tablet (0.4 mg total) under the tongue every 5 (five) minutes as needed for chest pain. Max of 3 doses in 15 minutes.     NOVOLIN N 100 unit/mL injection Inject 31 Units under the skin 2 (two) times a day before meals. (Patient taking differently: Inject under the skin 2 (two) times a day before meals. 31 units at AM and 31 units at HS)     amoxicillin (AMOXIL) 875 MG tablet Take 1 tablet (875 mg total) by mouth 2 (two) times a day for 10 days.     antipyrine-benzocaine (AURALGAN) otic solution Apply ear drops to right ear three times daily as needed     omeprazole (PRILOSEC) 20 MG capsule Take 1 capsule (20 mg total) by mouth daily before breakfast.     QUEtiapine (SEROQUEL) 300 MG tablet Take 1 tablet (300 mg total) by mouth at bedtime.     SYRING W-NDL,DISP,INSUL,0.5 ML (INSULIN SYRINGE MISC) Use As Directed.     tamsulosin (FLOMAX) 0.4 mg Cp24 Take 1 capsule (0.4 mg total) by mouth daily.     traMADol (ULTRAM) 50 mg tablet Take 1 tablet (50 mg total) by mouth every 8 (eight) hours as needed for pain.       PSFHx: appropriate sections of PMH completed/filled in   Tobacco Status:  He  reports  that he has never smoked. He has never used smokeless tobacco.    Review of Systems:  No fever.  No rash.    Objective:    Pulse 78  Temp 97.9  F (36.6  C) (Oral)   SpO2 97% Comment: RA  GENERAL: No acute distress.  HEENT: eyes clear; left TM slightly scarred but mostly clear, retracted; right TM erythematous, neutral position but as if it had been bulging, some pus behind;  NECK: supple, skin is a bit thick but I believe I felt some mild adenopathy, nontender  Ht: reg s1s2  Lungs: clear with good aeration

## 2021-06-17 NOTE — TELEPHONE ENCOUNTER
Who is calling:  Pt wifeRosie    Reason for Call:  Received call from PCP [place] about apt today at 1 pm - phone.  Pt wife wrote it down as tomorrow at 1.  Wife notified of new apt for 6/2/21 at 1240 pm.      Wife is very sorry.  Thanks.     Call taken on 5//24/21 at 405 pm by Lilliana Turner CMA. CMT

## 2021-06-17 NOTE — PROGRESS NOTES
MTM Encounter    Assessment/Plan  Bipolar: Under excellent control with improvements in sleep and anxiety levels, resolution of tremor but not gait instability.  Saulo is very satisfied with the switch from Haldol to Seroquel.  Of note, he has gained a little weight since starting on omeprazole, but is actively working on improving his diet.  - Continue current therapy     Diabetes: Saulo's DM is not well controlled currently and likely worsening slightly since starting quetiapine.  Saulo to bring glucometer to next appointment for review.  Plan to adjust insulin into two injections per day.  He will continue to fill his NPH and R insulin at [facility] due to their low cost - the rest of his meds should go to [facility].    - Continue current therapy     Hypertension: Well controlled to goal of <140/90.  Resending lisinopril rx to simplify regimen.  - stop lisinopril two 20mg tablets  - start lisinopril 40mg once    Dyslipidemia: Well controlled.  Switching to atorvastatin as part of drug optimization to reduce the risk of drug interactions, side effects, and pill size.  Similar potency prescribed.  - stop simvastatin 40mg  - start atorvastatin 20mg daily    Supplements: We reviewed all the pros and cons of his supplements and agreed that most of them were not necessary.   - stop B complex, two chewable C gummies, fiber gummy, multivitamin 1-2 times per week, magnesium and a probiotic.    Follow Up  4-6 weeks for DM, dyslipidemia      Subjective/Objective  Mahad Orellana is a 68 y.o. male here for a follow up medication therapy management (MTM) appointment; his chief concern today is MTM follow up for CMR and bipolar.    Bipolar: Saulo switched from haldol to quetiapine two months ago.  Since switching his tremor has gone away.  Saulo is sleeping better during the night than he ever has.  His anxiety is much better during the day.    Diabetes: Saulo is taking 35 units of NPH along with 22 units of regular at around 11:30 and 32 units of  Regular at 530pm and 35 units of NPH at 10pm.  Lab Results   Component Value Date    HGBA1C 8.1 (H) 09/13/2017    HGBA1C 8.9 (H) 05/24/2017    HGBA1C 7.9 (H) 09/28/2016     Lab Results   Component Value Date    MICROALBUR 0.52 11/14/2012    LDLCALC 50 12/19/2016    CREATININE 0.88 02/24/2017     Vitamins: Saulo is taking a B complex, two chewable C gummies, fiber gummy, multivitamin 1-2 times per week, magnesium and a probiotic.    CAD: Saulo saw his cardiologist Dr. Posey two months ago who felt that he should continue using Plavix for another year.    Back Pain: Saulo is taking Cymbalta, cyclobenzaprine and tylenol which is doing a great job     BPH: Saulo is taking tamsulosin which is working great.    Obesity: Saulo and Rsoie are both trying to cut back on portions.    ----------------    aMhad's medication list was reviewed with them, discussing reason for use, directions for use, and potential side effects of each medication as needed. Indication, safety, efficacy, and convenience was assessed for all medications addressed above.  No environmental factors were noted currently affecting patient.  This care plan was communicated via EMR with his primary care provider, Mahad Morales MD, who is the authorizing prescriber for this visit.  Direct supervision was available by either the patient's PCP or other available provider.  Time and complexity billing metrics are included in the docflowsheet linked to this visit.  Time spent: 30 minutes      Jose Manuel Pack, PharmD  [facility] Pharmacist  [place] [facility]  [phone]

## 2021-06-17 NOTE — TELEPHONE ENCOUNTER
Telephone Encounter by Maynor Trinh at 3/12/2021  9:32 AM     Author: Maynor Trinh Service: -- Author Type: Patient Access    Filed: 3/12/2021  9:35 AM Encounter Date: 3/10/2021 Status: Signed    : Maynor Trinh (Patient Access)       PRIOR AUTHORIZATION DENIED    Denial Rational: Coverage is approved where patient is being treated with insulin with multiple, 3 or more daily injection of insulin or a Medicare-covered insulin infusion pump or where the patient has a documented history of hypoglycemia due to insulin use. Coverage cannot be provided at this time.          Appeal Information: If the provider would like to appeal this denial, please provide a letter of medical necessity and once it has been completed and placed in the patient's chart, notify the [facility] ([facility] MED [id number]) and the appeal can be initiated on behalf of the patient and provider.  Please also include any therapies that the patient has tried and their outcomes.

## 2021-06-17 NOTE — PATIENT INSTRUCTIONS - HE
Relion Novolin 70/30  take    100 units 20 mintues before breakfast at 11:00 am    and     100 units insulin 20 minutes before dinner at 6 pm

## 2021-06-18 ENCOUNTER — COMMUNICATION - HEALTHEAST (OUTPATIENT)
Dept: FAMILY MEDICINE | Facility: CLINIC | Age: 71
End: 2021-06-18

## 2021-06-18 NOTE — PROGRESS NOTES
MTM Follow Up Encounter  Assessment & Plan                                                     1. Dyslipidemia  Rechecking cholesterol due to new start of quetiapine.  - Lipid Cascade    2. Type 2 diabetes mellitus with hyperglycemia, with long-term current use of insulin  I advised Saulo and Rosie on an increase in his current use of insulin until he runs out of the individual NPH and regular insulins.  I will combine them into 2 doses daily at this time since he is frequently not taking his regular insulin with lunch anyway.  Given his consistently higher blood sugars in the 200s and 300s, we will also make a significant increase in his current dose and future planned doses at 70/30.  - 50 units of NPH and 20 units of regular insulin in the morning, 35 units of regular and 50 units of NPH with supper.  - insulin NPH-insulin regular (NOVOLIN 70/30 U-100 INSULIN) 100 unit/mL (70-30) injection; Inject 80 Units under the skin 2 (two) times a day before meals.  Dispense: 10 mL; Refill: 11    Medication Adherence/Access: No concerns    Follow Up  Return in about 6 weeks (around 2018) for Diabetes.        Subjective & Objective                                                       Mahad Orellana is a 68 y.o. male coming in for a follow up visit for Medication Therapy Management. He was referred to me from Mahad Morales MD    Chief Complaint: Diabetes and cholesterol check    Diabetes: Saulo is taking 40 units of NPH and 20 units of regular insulin in the morning, 35 units of regular at lunch, and 38 units of NPH before bed.  He estimates that he will run out of this insulin in the next week or two.    AM fastin-360 mg/dL  Before bed: 250-360 mg/dl    Dyslipidemia: LDL OK since starting quetiapine, but TG not checked.  Lab Results   Component Value Date    LDLDIRECT 55 2018       PMH: reviewed in EPIC   Allergies/ADRs: reviewed in EPIC   Alcohol: reviewed in EPIC   Tobacco:   History   Smoking Status     Never  Smoker   Smokeless Tobacco     Never Used     Today's Vitals:   Vitals:    05/23/18 1331   BP: 130/74   Pulse: 64     ----------------    Much or all of the text in this note was generated through the use of Dragon Dictate voice-to-text software. Errors in spelling or words which seem out of context are unintentional. Sound alike errors, in particular, may have escaped editing.    The patient was given a summary of these recommendations as an after visit summary    I spent 30 minutes with this patient today; Patient is not Medicare Part D. All changes were made via collaborative practice agreement with Mahad Morales MD. A copy of the visit note was provided to the patient's provider.     Jose Manuel Pack, PharmD, BCACP  MTM Pharmacist at [facility]       Current Outpatient Prescriptions   Medication Sig Dispense Refill     acetaminophen (TYLENOL) 500 MG tablet Take 500 mg by mouth 2 (two) times a day.       antipyrine-benzocaine (AURALGAN) otic solution Apply ear drops to right ear three times daily as needed 14 mL 1     aspirin 81 MG EC tablet Take 81 mg by mouth daily.       atenolol (TENORMIN) 50 MG tablet Take 1 tablet (50 mg total) by mouth at bedtime. 90 tablet 1     atorvastatin (LIPITOR) 20 MG tablet Take 1 tablet (20 mg total) by mouth daily. 90 tablet 3     blood glucose meter (GLUCOMETER) OneTouch Ultra. Dispense glucometer brand per patient's insurance at pharmacy discretion. Pt testing 3 times per day. 1 each 0     blood glucose test strips Dispense brand per patient's insurance at pharmacy discretion. TEST 3 times per day. 350 strip 3     cholecalciferol, vitamin D3, 1,000 unit tablet Take 2,000 Units by mouth every other day. Takes at 1200       clopidogrel (PLAVIX) 75 mg tablet Take 1 tablet (75 mg total) by mouth daily. 90 tablet 3     cyclobenzaprine (FLEXERIL) 5 MG tablet TAKE ONE TABLET BY MOUTH THREE TIMES DAILY AS NEEDED FOR MUSCLE SPASM  90 tablet 0     DULoxetine  (CYMBALTA) 60 MG capsule TAKE 1 CAPSULE (60 MG TOTAL)  BY MOUTH TWICE PER DAY. 180 capsule 3     generic lancets Dispense brand per patient's insurance at pharmacy discretion. TESTS 3 times per day 350 each 3     insulin needles, disposable, 31 Ndle 3 (three) times a day. For insulin injections.       insulin NPH-insulin regular (NOVOLIN 70/30 U-100 INSULIN) 100 unit/mL (70-30) injection Inject 80 Units under the skin 2 (two) times a day before meals. 10 mL 11     insulin syringe-needle U-100 1 mL 31 gauge x 5/16 Syrg USE AS DIRECTED ONCE DAILY 100 each 2     isosorbide mononitrate (IMDUR) 30 MG 24 hr tablet TAKE ONE TABLET BY MOUTH ONCE DAILY 90 tablet 2     lisinopril (PRINIVIL,ZESTRIL) 40 MG tablet Take 1 tablet (40 mg total) by mouth daily. 90 tablet 3     metFORMIN (GLUCOPHAGE) 1000 MG tablet Take 1 tablet (1,000 mg total) by mouth 2 (two) times a day with meals. 180 tablet 3     nitroglycerin (NITROSTAT) 0.4 MG SL tablet Place 1 tablet (0.4 mg total) under the tongue every 5 (five) minutes as needed for chest pain. Max of 3 doses in 15 minutes. 25 tablet 1     omeprazole (PRILOSEC) 20 MG capsule Take 1 capsule (20 mg total) by mouth daily before breakfast. 90 capsule 3     QUEtiapine (SEROQUEL) 300 MG tablet Take 1 tablet (300 mg total) by mouth at bedtime. 90 tablet 3     tamsulosin (FLOMAX) 0.4 mg Cp24 Take 1 capsule (0.4 mg total) by mouth daily. 90 capsule 3     traMADol (ULTRAM) 50 mg tablet Take 1 tablet (50 mg total) by mouth every 8 (eight) hours as needed for pain. 60 tablet 1     No current facility-administered medications for this visit.

## 2021-06-19 NOTE — PROGRESS NOTES
MTM Follow Up Encounter  Assessment & Plan                                                     1. Type 2 diabetes mellitus with hyperglycemia, with long-term current use of insulin (H)  Uncontrolled to goal 7%. Mahad has now consolidated his insulin into 2 doses per day, but his blood sugars are still consistently in the mid 200s to mid 300s.  We spent a majority of today's visit talking about dietary choices as Saulo has poor insight into high carbohydrate foods.  He questioned if Cheerios and oatmeal were good things to eat for his diabetes.  I offered nutritional counseling with her diabetes educator, which he declines at this time.  -Increase NovoLog 70/30 to 85 units twice daily, increased to 90 units twice daily after 1 week if no blood sugars less than 130 mg/dL    2. Essential hypertension  Controlled to goal of less than 140/90, continue current therapy    3. Dyslipidemia  We discussed that his higher triglycerides are also likely related to his diet.  He should cut down on fried food and prepackaged snack foods.  We could consider increasing his atorvastatin to 80 mg in order to help control his triglycerides if needed.  -Work on improving diet, recheck lipid cascade in 6 months    Follow Up  Return in about 6 weeks (around 8/13/2018) for Diabetes.      Subjective & Objective                                                       Mahad Orellana is a 68 y.o. male coming in for a follow up visit for Medication Therapy Management. He was referred to me from Mahad Morales MD    Chief Complaint: diabetes follow up    Medication Adherence/Access: Medications managed by wife, Rosie.  Cost has been an issue in the past, but now on more affordable meds.  No concerns at this time.    Diabetes: Novolog 70/30 80 units two times a day, metformin 1000 mg twice daily.  Started 70/30 on May 25th, now on 80 units at 11:30 80 units at 5:30  Eats a lot of cheerios, oatmeal, and pancakes.     Hypertension: Atenolol 50 mg  nightly, Imdur 30 mg nightly, lisinopril 40 mg daily    Dyslipidemia: Atorvastatin 20mg daily.    PMH: reviewed in EPIC   Allergies/ADRs: reviewed in EPIC   Alcohol: reviewed in EPIC   Tobacco:   History   Smoking Status     Never Smoker   Smokeless Tobacco     Never Used     Today's Vitals:   Vitals:    07/02/18 1400   BP: 120/76   Pulse: 64   Weight: (!) 267 lb 4 oz (121.2 kg)     ----------------    Much or all of the text in this note was generated through the use of Dragon Dictate voice-to-text software. Errors in spelling or words which seem out of context are unintentional. Sound alike errors, in particular, may have escaped editing.    The patient was given a summary of these recommendations as an after visit summary    I spent 30 minutes with this patient today; Patient is not Medicare Part D. All changes were made via collaborative practice agreement with Mahad Morales MD. A copy of the visit note was provided to the patient's provider.     Jose Manuel Pack, PharmD, BCACP  MTM Pharmacist at [facility]     Current Outpatient Prescriptions   Medication Sig Dispense Refill     acetaminophen (TYLENOL) 500 MG tablet Take 500 mg by mouth 2 (two) times a day.       antipyrine-benzocaine (AURALGAN) otic solution Apply ear drops to right ear three times daily as needed 14 mL 1     aspirin 81 MG EC tablet Take 81 mg by mouth daily.       atenolol (TENORMIN) 50 MG tablet Take 1 tablet (50 mg total) by mouth at bedtime. 90 tablet 1     atorvastatin (LIPITOR) 20 MG tablet Take 1 tablet (20 mg total) by mouth daily. 90 tablet 3     blood glucose meter (GLUCOMETER) OneTouch Ultra. Dispense glucometer brand per patient's insurance at pharmacy discretion. Pt testing 3 times per day. 1 each 0     blood glucose test strips Dispense brand per patient's insurance at pharmacy discretion. TEST 3 times per day. 350 strip 3     cholecalciferol, vitamin D3, 1,000 unit tablet Take 2,000 Units by mouth every  other day. Takes at 1200       clopidogrel (PLAVIX) 75 mg tablet Take 1 tablet (75 mg total) by mouth daily. 90 tablet 3     cyclobenzaprine (FLEXERIL) 5 MG tablet TAKE ONE TABLET BY MOUTH THREE TIMES DAILY AS NEEDED FOR MUSCLE SPASM  90 tablet 0     DULoxetine (CYMBALTA) 60 MG capsule TAKE 1 CAPSULE (60 MG TOTAL)  BY MOUTH TWICE PER DAY. 180 capsule 3     generic lancets Dispense brand per patient's insurance at pharmacy discretion. TESTS 3 times per day 350 each 3     insulin needles, disposable, 31 Ndle 3 (three) times a day. For insulin injections.       insulin NPH-insulin regular (NOVOLIN 70/30 U-100 INSULIN) 100 unit/mL (70-30) injection Inject 85-90 Units under the skin 2 (two) times a day before meals. 50 mL 11     insulin syringe-needle U-100 1 mL 31 gauge x 5/16 Syrg USE AS DIRECTED ONCE DAILY 100 each 2     isosorbide mononitrate (IMDUR) 30 MG 24 hr tablet TAKE ONE TABLET BY MOUTH ONCE DAILY 90 tablet 2     lisinopril (PRINIVIL,ZESTRIL) 40 MG tablet Take 1 tablet (40 mg total) by mouth daily. 90 tablet 3     metFORMIN (GLUCOPHAGE) 1000 MG tablet Take 1 tablet (1,000 mg total) by mouth 2 (two) times a day with meals. 180 tablet 3     nitroglycerin (NITROSTAT) 0.4 MG SL tablet Place 1 tablet (0.4 mg total) under the tongue every 5 (five) minutes as needed for chest pain. Max of 3 doses in 15 minutes. 25 tablet 1     omeprazole (PRILOSEC) 20 MG capsule Take 1 capsule (20 mg total) by mouth daily before breakfast. 90 capsule 3     QUEtiapine (SEROQUEL) 300 MG tablet Take 1 tablet (300 mg total) by mouth at bedtime. 90 tablet 3     tamsulosin (FLOMAX) 0.4 mg Cp24 Take 1 capsule (0.4 mg total) by mouth daily. 90 capsule 3     traMADol (ULTRAM) 50 mg tablet Take 1 tablet (50 mg total) by mouth every 8 (eight) hours as needed for pain. 60 tablet 1     No current facility-administered medications for this visit.

## 2021-06-20 NOTE — LETTER
Letter by Mahad Morales MD at      Author: Mahad Morales MD Service: -- Author Type: --    Filed:  Encounter Date: 1/2/2020 Status: Signed         Mahad DUONG Esteban  [address]  [place] [address]             January 2, 2020         Dear Mr. Orellana,    Below are the results from your recent visit:    Resulted Orders   Glycosylated Hemoglobin A1c   Result Value Ref Range    Hemoglobin A1c 9.6 (H) 3.5 - 6.0 %   Basic Metabolic Panel   Result Value Ref Range    Sodium 138 136 - 145 mmol/L    Potassium 4.2 3.5 - 5.0 mmol/L    Chloride 107 98 - 107 mmol/L    CO2 16 (L) 22 - 31 mmol/L    Anion Gap, Calculation 15 5 - 18 mmol/L    Glucose 185 (H) 70 - 125 mg/dL    Calcium 9.4 8.5 - 10.5 mg/dL    BUN 11 8 - 28 mg/dL    Creatinine 0.89 0.70 - 1.30 mg/dL    GFR MDRD Af Amer >60 >60 mL/min/1.73m2    GFR MDRD Non Af Amer >60 >60 mL/min/1.73m2    Narrative    Fasting Glucose reference range is 70-99 mg/dL per  American Diabetes Association (ADA) guidelines.       Your lab test results as detailed above are within acceptable limits.     Please call with questions or contact us using [employer].    Sincerely,        Electronically signed by Mahad Morales MD

## 2021-06-20 NOTE — LETTER
Letter by Elva Kong MD at      Author: Elva Kong MD Service: -- Author Type: --    Filed:  Encounter Date: 5/19/2020 Status: (Other)         May 19, 2020     Patient: Mahad Orellana   YOB: 1950   Date of Visit: 5/19/2020       To Whom it May Concern:    Mahad Orellana was seen in my clinic on 5/19/2020.    If you have any questions or concerns, please don't hesitate to call.    Sincerely,         Electronically signed by Elva Kong MD

## 2021-06-20 NOTE — PROGRESS NOTES
[facility] Follow Up Encounter  Assessment & Plan                                                     1. Type 2 diabetes mellitus without complication, with long-term current use of insulin (H)  Uncontrolled to goal of <8%.  Likely improved, but worsening recently.  Briefly discussed shoring up his diet otherwise we would need to increase his insulin again.  We will wait for today's results before making further adjustments.  Depending on results, we may need to also re-evaluate his quetiapine.  Hopefully we can avoid stopping it though as he is very satisfied on the combination of quetiapine and duloxetine.  - A1c  - BMP    2. Chronic low back pain, unspecified back pain laterality, with sciatica presence unspecified  Saulo has self discontinued his cyclobenzaprine and tramadol due to dizziness.  - stop cyclobenzaprine  - stop tramadol    Follow Up  With PCP in 3 months.  Saulo and Rosie would like to establish with the new [facility] pharmacist once available.    Subjective & Objective                                                       Mahad Orellana is a 68 y.o. male coming in for a follow up visit for Medication Therapy Management. He was referred to me from Mahad Morales MD    Chief Complaint: diabetes follow up    Medication Adherence/Access: no concerns    Diabetes:  Novolog 70/30 85 units two times a day, metformin 1000 mg twice daily  Saulo has been eating ice cream and other sweets lately.  His blood sugars were really good for a while - in the 100s, but have been higher recently.  Afternoon: 270  Evenin  Lab Results   Component Value Date    HGBA1C 10.5 (H) 2018    HGBA1C 8.1 (H) 2017    HGBA1C 8.9 (H) 2017     Lab Results   Component Value Date    MICROALBUR 0.52 2012    LDLCALC  2018      Comment:      Invalid, Triglycerides >400    CREATININE 1.26 2018     Back Pain: cyclobenzaprine, tramadol  Saulo has stopped both tramadol and cyclobenzarpine and is feeling a little better.  He  was getting a little dizzy when he was using these more regularly.    PMH: reviewed in EPIC   Allergies/ADRs: reviewed in EPIC   Alcohol: reviewed in EPIC   Tobacco:   History   Smoking Status     Never Smoker   Smokeless Tobacco     Never Used     Today's Vitals: There were no vitals filed for this visit.  ----------------    Much or all of the text in this note was generated through the use of Dragon Dictate voice-to-text software. Errors in spelling or words which seem out of context are unintentional. Sound alike errors, in particular, may have escaped editing.    The patient declined an after visit summary    I spent 30 minutes with this patient today;  All changes were made via collaborative practice agreement with Mahad Morales MD. A copy of the visit note was provided to the patient's provider.     Jose Manuel Pack, PharmD, BCACP  [facility] Pharmacist at [facility]     Current Outpatient Prescriptions   Medication Sig Dispense Refill     acetaminophen (TYLENOL) 500 MG tablet Take 500 mg by mouth 2 (two) times a day.       antipyrine-benzocaine (AURALGAN) otic solution Apply ear drops to right ear three times daily as needed 14 mL 1     aspirin 81 MG EC tablet Take 81 mg by mouth daily.       atenolol (TENORMIN) 50 MG tablet Take 1 tablet (50 mg total) by mouth at bedtime. 90 tablet 1     atorvastatin (LIPITOR) 20 MG tablet Take 1 tablet (20 mg total) by mouth daily. 90 tablet 3     blood glucose meter (GLUCOMETER) OneTouch Ultra. Dispense glucometer brand per patient's insurance at pharmacy discretion. Pt testing 3 times per day. 1 each 0     blood glucose test strips Dispense brand per patient's insurance at pharmacy discretion. TEST 3 times per day. 350 strip 3     cholecalciferol, vitamin D3, 1,000 unit tablet Take 2,000 Units by mouth every other day. Takes at 1200       clopidogrel (PLAVIX) 75 mg tablet Take 1 tablet (75 mg total) by mouth daily. 90 tablet 3     cyclobenzaprine  (FLEXERIL) 5 MG tablet TAKE ONE TABLET BY MOUTH THREE TIMES DAILY AS NEEDED FOR MUSCLE SPASM  90 tablet 0     DULoxetine (CYMBALTA) 60 MG capsule TAKE 1 CAPSULE (60 MG TOTAL)  BY MOUTH TWICE PER DAY. 180 capsule 3     generic lancets Dispense brand per patient's insurance at pharmacy discretion. TESTS 3 times per day 350 each 3     insulin needles, disposable, 31 Ndle 3 (three) times a day. For insulin injections.       insulin NPH-insulin regular (NOVOLIN 70/30 U-100 INSULIN) 100 unit/mL (70-30) injection Inject 85-90 Units under the skin 2 (two) times a day before meals. 50 mL 11     insulin syringe-needle U-100 1 mL 31 gauge x 5/16 Syrg USE AS DIRECTED ONCE DAILY 100 each 2     isosorbide mononitrate (IMDUR) 30 MG 24 hr tablet TAKE ONE TABLET BY MOUTH ONE TIME DAILY  90 tablet 0     lisinopril (PRINIVIL,ZESTRIL) 40 MG tablet Take 1 tablet (40 mg total) by mouth daily. 90 tablet 3     metFORMIN (GLUCOPHAGE) 1000 MG tablet TAKE ONE TABLET BY MOUTH 2 TIMES A DAY WITH MEALS 180 tablet 3     nitroglycerin (NITROSTAT) 0.4 MG SL tablet Place 1 tablet (0.4 mg total) under the tongue every 5 (five) minutes as needed for chest pain. Max of 3 doses in 15 minutes. 25 tablet 1     omeprazole (PRILOSEC) 20 MG capsule Take 1 capsule (20 mg total) by mouth daily before breakfast. 90 capsule 3     QUEtiapine (SEROQUEL) 300 MG tablet Take 1 tablet (300 mg total) by mouth at bedtime. 90 tablet 3     tamsulosin (FLOMAX) 0.4 mg Cp24 Take 1 capsule (0.4 mg total) by mouth daily. 90 capsule 3     traMADol (ULTRAM) 50 mg tablet Take 1 tablet (50 mg total) by mouth every 8 (eight) hours as needed for pain. 60 tablet 1     No current facility-administered medications for this visit.

## 2021-06-20 NOTE — LETTER
Letter by Elva Kong MD at      Author: Elva Kong MD Service: -- Author Type: --    Filed:  Encounter Date: 5/19/2020 Status: (Other)                    My Depression Action Plan  Name: Mhaad Orellana   Date of Birth 1950  Date: 5/19/2020    My Doctor: Mahad Morales MD   My Clinic: [facility]   [address] [place] [id number]  [phone]          GREEN    ZONE   Good Control    What it looks like:     Things are going generally well. You have normal ups and downs. You may even feel depressed from time to time, but bad moods usually last less than a day.   What you need to do:  1. Continue to care for yourself (see self care plan)  2. Check your depression survival kit and update it as needed  3. Follow your physicians recommendations including any medication.  4. Do not stop taking medication unless you consult with your physician first.           YELLOW         ZONE Getting Worse    What it looks like:     Depression is starting to interfere with your life.     It may be hard to get out of bed; you may be starting to isolate yourself from others.    Symptoms of depression are starting to last most all day and this has happened for several days.     You may have suicidal thoughts but they are not constant.   What you need to do:     1. Call your care team. Your response to treatment will improve if you keep your care team informed of your progress. Yellow periods are signs an adjustment may need to be made.     2. Continue your self-care.  Just get dressed and ready for the day.  Don't give yourself time to talk yourself out of it.    3. Talk to someone in your support network.    4. Open up your depression Depression Self-Care Plan / Wellness kit.           RED    ZONE Medical Alert - Get Help    What it looks like:     Depression is seriously interfering with your life.     You may experience these or other symptoms: You cant get out of bed most days, cant work or  engage in other necessary activities, you have trouble taking care of basic hygiene, or basic responsibilities, thoughts of suicide or death that will not go away, self-injurious behavior.     What you need to do:  1. Call your care team and request a same-day appointment. If they are not available (weekends or after hours) call your local crisis line, emergency room or 911.            Self-Care Plan / Wellness Kit    Self-Care for Depression  Heres the deal. Your body and mind are really not as separate as most people think.  What you do and think affects how you feel and how you feel influences what you do and think. This means if you do things that people who feel good do, it will help you feel better.  Sometimes this is all it takes.  There is also a place for medication and therapy depending on how severe your depression is, so be sure to consult with your medical provider and/ or Behavioral Health Consultant if your symptoms are worsening or not improving.     In order to better manage my stress, I will:    Exercise  Get some form of exercise, every day. This will help reduce pain and release endorphins, the feel good chemicals in your brain. This is almost as good as taking antidepressants!  This is not the same as joining a gym and then never going! (they count on that by the way?) It can be as simple as just going for a walk or doing some gardening, anything that will get you moving.      Hygiene   Maintain good hygiene (get out of bed in the morning, make your bed, brush your teeth, take a shower, and get dressed like you were going to work, even if you are unemployed).  If your clothes don't fit try to get ones that do.    Diet  Strive to eat foods that are good for me, drink plenty of water, and avoid excessive sugar, caffeine, alcohol, and other mood-altering substances.  Some foods that are helpful in depression are: complex carbohydrates, B vitamins, flaxseed, fish or fish oil, fresh fruits and  vegetables.    Psychotherapy  Agree to participate in Individual Therapy (if recommended).    Medication  If prescribed medications, I agree to take them.  Missing doses can result in serious side effects.  I understand that drinking alcohol, or other illicit drug use, may cause potential side effects.  I will not stop my medication abruptly without first discussing it with my provider.    Staying Connected With Others  Stay in touch with my friends, family members, and my primary care provider/team.    Use your imagination  Be creative.  We all have a creative side; it doesnt matter if its oil painting, sand castles, or mud pies! This will also kick up the endorphins.    Witness Beauty  (AKA stop and smell the roses) Take a look outside, even in mid-winter. Notice colors, textures. Watch the squirrels and birds.     Service to others  Be of service to others.  There is always someone else in need.  By helping others we can get out of ourselves and remember the really important things.  This also provides opportunities for practicing all the other parts of the program.    Humor  Laugh and be silly!  Adjust your TV habits for less news and crime-drama and more comedy.    Control your stress  Try breathing deep, massage therapy, biofeedback, and meditation. Find time to relax each day.     Crisis Text Line  http://www.crisistextline.org    The Crisis Text Line serves anyone, in any type of crisis, providing access to free, 24/7 support and information via the medium people already use and trust:    Here's how it works:  1.  Text 379-786 from anywhere in the [place], anytime, about any type of crisis.  2.  A live, trained Crisis Counselor receives the text and responds quickly.  3.  The volunteer Crisis Counselor will help you move from a 'hot moment to a cool moment'.  My support system    Clinic Contact:  Phone number:    Contact 1:  Phone number:    Contact 2:  Phone number:    [facility]:  Phone  number:    Therapist:  Phone number:    [facility] crisis center:    Phone number:    Other community support:  Phone number:

## 2021-06-22 NOTE — PROGRESS NOTES
ASSESMENT AND PLAN:  Diagnoses and all orders for this visit:    Bipolar disorder, current episode manic without psychotic features, severe (H)  Extensive counseling today with the patient and his wife.  We discussed options for management and the patient is not willing to follow-up with a psychiatrist.  He is willing to make adjustments in his medications as detailed below we will be decreasing the Cymbalta and increasing the quetiapine.  He will move Cymbalta dosing to morning and use quetiapine 400 mg at bedtime.  Close follow-up here in the clinic in 2 weeks for routine recheck but immediate follow-up if necessary for worsening.  -     DULoxetine 40 mg CpDR; Take 40 mg by mouth every morning.  Dispense: 30 capsule; Refill: 6  -     QUEtiapine (SEROQUEL) 400 MG tablet; Take 1 tablet (400 mg total) by mouth at bedtime.  Dispense: 30 tablet; Refill: 6    Left ear pain  Reviewed the recent notes from my partner and from the emergency room and counseled the patient on options for ongoing treatment and workup but I am almost certain that once his manic episode is controlled the amount of bother that the ear symptoms cause him is going to decrease significantly.          SUBJECTIVE: 68-year-old male who has been having ear pain for about 4 months.  It had been bilateral in the past but now is just left-sided.  It had become severe and has become overwhelmingly bothersome to the patient.  He was in the emergency room recently had a recent clinic visit as well.  At the emergency room he had a negative CT scan of the head.  At the clinic visit he was prescribed some medication for pain control but discontinued the oxycodone acetaminophen after having concerns about side effects and it was also ineffective.  Patient reports he continues to have a stabbing ear pain in the left side that waxes and wanes.  No new associated symptoms.    I have known the patient for a long time and today he is obviously manic.  His wife also  reports that he has been constantly talking, has not been sleeping well, and is overly focused on some of the stressors in his life detailed below.  Patient acknowledges most of this.  Currently he is taking 60 mg of Cymbalta at bedtime along with 300 mg of quetiapine.  Patient reports being very upset about the neighbors that live above them.  He reports feeling threatened by them and very upset by the fact that they are making noise in the middle of the night that makes it hard for him to sleep.  He also reports that he has called the police but the police will not do anything about it.  He also reports feeling disrespected and mistreated at his recent visit with a head and neck specialist.  He also expresses some fear and anger around global and national issues.    Past Medical History:   Diagnosis Date     Arthritis      Asthma      Bipolar affective disorder (H)      BPH (benign prostatic hyperplasia) 01/06/2015     CAD (coronary artery disease) 07/20/2015     Diabetes mellitus (H)      Dyslipidemia, goal LDL below 70 07/20/2015     Eczema      Essential hypertension      Tardive dyskinesia      Patient Active Problem List   Diagnosis     Subacute dyskinesia due to drug     Dyslipidemia, goal LDL below 70     Essential hypertension     Type 2 diabetes mellitus (H)     Bipolar Disorder     Enlarged prostate with urinary obstruction     Coronary artery disease     Erectile dysfunction     Gait abnormality     Gastroesophageal reflux disease without esophagitis     Chronic back pain     Current Outpatient Medications   Medication Sig Dispense Refill     acetaminophen (TYLENOL) 500 MG tablet Take 500 mg by mouth 2 (two) times a day.       antipyrine-benzocaine (AURALGAN) otic solution Apply ear drops to right ear three times daily as needed 14 mL 1     aspirin 81 MG EC tablet Take 81 mg by mouth daily.       atenolol (TENORMIN) 50 MG tablet TAKE ONE TABLET BY MOUTH EVERY NIGHT AT BEDTIME 90 tablet 0      atorvastatin (LIPITOR) 20 MG tablet Take 1 tablet (20 mg total) by mouth daily. 90 tablet 3     blood glucose meter (GLUCOMETER) OneTouch Ultra. Dispense glucometer brand per patient's insurance at pharmacy discretion. Pt testing 3 times per day. 1 each 0     blood glucose test strips Dispense brand per patient's insurance at pharmacy discretion. TEST 3 times per day. 350 strip 3     cholecalciferol, vitamin D3, 1,000 unit tablet Take 2,000 Units by mouth every other day. Takes at 1200       clopidogrel (PLAVIX) 75 mg tablet Take 1 tablet (75 mg total) by mouth daily. 90 tablet 3     cyclobenzaprine (FLEXERIL) 5 MG tablet TAKE ONE TABLET BY MOUTH THREE TIMES DAILY AS NEEDED FOR MUSCLE SPASM  90 tablet 0     DULoxetine 40 mg CpDR Take 40 mg by mouth every morning. 30 capsule 6     generic lancets Dispense brand per patient's insurance at pharmacy discretion. TESTS 3 times per day 350 each 3     insulin needles, disposable, 31 Ndle 3 (three) times a day. For insulin injections.       insulin NPH-insulin regular (NOVOLIN 70/30 U-100 INSULIN) 100 unit/mL (70-30) injection Inject 85-90 Units under the skin 2 (two) times a day before meals. 50 mL 11     insulin syringe-needle U-100 1 mL 31 gauge x 5/16 Syrg USE AS DIRECTED ONCE DAILY 100 each 2     isosorbide mononitrate (IMDUR) 30 MG 24 hr tablet TAKE ONE TABLET BY MOUTH ONE TIME DAILY 90 tablet 3     lisinopril (PRINIVIL,ZESTRIL) 40 MG tablet Take 1 tablet (40 mg total) by mouth daily. 90 tablet 3     metFORMIN (GLUCOPHAGE) 1000 MG tablet TAKE ONE TABLET BY MOUTH 2 TIMES A DAY WITH MEALS 180 tablet 3     nitroglycerin (NITROSTAT) 0.4 MG SL tablet Place 1 tablet (0.4 mg total) under the tongue every 5 (five) minutes as needed for chest pain. Max of 3 doses in 15 minutes. 25 tablet 1     omeprazole (PRILOSEC) 20 MG capsule Take 1 capsule (20 mg total) by mouth daily before breakfast. 90 capsule 3     oxyCODONE-acetaminophen (PERCOCET/ENDOCET) 5-325 mg per tablet Take 1  "tablet by mouth every 6 (six) hours as needed for pain. 13 tablet 0     QUEtiapine (SEROQUEL) 400 MG tablet Take 1 tablet (400 mg total) by mouth at bedtime. 30 tablet 6     tamsulosin (FLOMAX) 0.4 mg Cp24 Take 1 capsule (0.4 mg total) by mouth daily. 90 capsule 3     No current facility-administered medications for this visit.      Social History     Tobacco Use   Smoking Status Never Smoker   Smokeless Tobacco Never Used       OBJECTICE: /66 (Patient Site: Right Arm, Patient Position: Sitting, Cuff Size: Adult Large)   Pulse 74   Temp 98.4  F (36.9  C) (Oral)   Resp 16   Ht 6' 1\" (1.854 m)   SpO2 92%   BMI 34.30 kg/m       No results found for this or any previous visit (from the past 24 hour(s)).     GEN-alert, in no acute distress   HEENT-tympanic membranes appeared normal bilaterally.  External auditory canals appear normal.  Oropharynx looks clear, neck is supple with no palpable mass or lymphadenopathy.   CV-regular rate and rhythm with no murmur   RESP-lungs clear to auscultation   Psychiatric- appearance is well-groomed, speech is very pressured, mood is somewhat depressed, affect is agitated, thought content negative for suicidal or homicidal ideation but positive for perseveration on the above-mentioned issues, thought processing negative for delusional thinking but positive for some borderline paranoia.  Judgment and insight are limited.   Neurologic-cranial nerves II through XII are intact, strength and sensation are intact and symmetric.    Mahad Morales"

## 2021-06-22 NOTE — PROGRESS NOTES
Chief Complaint   Patient presents with     ER - F/U, and earache.     From [facility]./ Need  Rx for earache.         HPI:   Mahad Orellana is a 68 y.o. male with wife c/o left ear pain for the last four months.  States it is a sharp, shooting pain originating in the left ear and shooting up to top of head.  Occurs over and over.  Mildly less frequent today. He has pain in the top part of his head with it, but this seems to be related to the shooting aspect of it.  He has had no hearing loss.  He has had no drainage from his ear.  He denies numbness of head or neck.  He denies weakness of head or neck.  He has had some dizziness--this has been going on for several months.  He had pain in the right ear last march/april.  He was treated for otitis media--eventually saw ENT.  They thought at [facility] but we have no record.  State his ear was washed out there.  The pain in the right ear resolved.  He was seen in the ER yesterday for the pain and did have a head CT--there was some concern for a CVA particularly as he is on plavix for cardiac disease. Head CT was negative.  They recommended tylenol for the pain.  He states tylenol no help.  Has also tried some tramadol which he has at home from previous problem and that did help.    He has DM.  He has had achiness of entire body.  Has had some gait problems several months ago--referred to neurology but didn't go.      ROS:  Constitutional: no fever  Eyes: no visual changes  ENT: as per HPi  Respiratory: no shortness of breath    CV: no chest pain  GI: negative   : negative   SKIN: no rash  MS: as per HPi  NEURO: as per HPI     Medications:  Current Outpatient Medications on File Prior to Visit   Medication Sig Dispense Refill     acetaminophen (TYLENOL) 500 MG tablet Take 500 mg by mouth 2 (two) times a day.       antipyrine-benzocaine (AURALGAN) otic solution Apply ear drops to right ear three times daily as needed 14 mL 1     aspirin 81 MG EC tablet Take 81 mg by mouth  daily.       atenolol (TENORMIN) 50 MG tablet Take 1 tablet (50 mg total) by mouth at bedtime. 90 tablet 1     atorvastatin (LIPITOR) 20 MG tablet Take 1 tablet (20 mg total) by mouth daily. 90 tablet 3     blood glucose meter (GLUCOMETER) OneTouch Ultra. Dispense glucometer brand per patient's insurance at pharmacy discretion. Pt testing 3 times per day. 1 each 0     blood glucose test strips Dispense brand per patient's insurance at pharmacy discretion. TEST 3 times per day. 350 strip 3     cholecalciferol, vitamin D3, 1,000 unit tablet Take 2,000 Units by mouth every other day. Takes at 1200       clopidogrel (PLAVIX) 75 mg tablet Take 1 tablet (75 mg total) by mouth daily. 90 tablet 3     cyclobenzaprine (FLEXERIL) 5 MG tablet TAKE ONE TABLET BY MOUTH THREE TIMES DAILY AS NEEDED FOR MUSCLE SPASM  90 tablet 0     DULoxetine (CYMBALTA) 60 MG capsule TAKE ONE CAPSULE BY MOUTH TWICE DAILY  180 capsule 0     generic lancets Dispense brand per patient's insurance at pharmacy discretion. TESTS 3 times per day 350 each 3     insulin needles, disposable, 31 Ndle 3 (three) times a day. For insulin injections.       insulin NPH-insulin regular (NOVOLIN 70/30 U-100 INSULIN) 100 unit/mL (70-30) injection Inject 85-90 Units under the skin 2 (two) times a day before meals. 50 mL 11     insulin syringe-needle U-100 1 mL 31 gauge x 5/16 Syrg USE AS DIRECTED ONCE DAILY 100 each 2     isosorbide mononitrate (IMDUR) 30 MG 24 hr tablet TAKE ONE TABLET BY MOUTH ONE TIME DAILY 90 tablet 3     lisinopril (PRINIVIL,ZESTRIL) 40 MG tablet Take 1 tablet (40 mg total) by mouth daily. 90 tablet 3     metFORMIN (GLUCOPHAGE) 1000 MG tablet TAKE ONE TABLET BY MOUTH 2 TIMES A DAY WITH MEALS 180 tablet 3     nitroglycerin (NITROSTAT) 0.4 MG SL tablet Place 1 tablet (0.4 mg total) under the tongue every 5 (five) minutes as needed for chest pain. Max of 3 doses in 15 minutes. 25 tablet 1     omeprazole (PRILOSEC) 20 MG capsule Take 1 capsule (20 mg  total) by mouth daily before breakfast. 90 capsule 3     QUEtiapine (SEROQUEL) 300 MG tablet Take 1 tablet (300 mg total) by mouth at bedtime. 90 tablet 3     tamsulosin (FLOMAX) 0.4 mg Cp24 Take 1 capsule (0.4 mg total) by mouth daily. 90 capsule 3     No current facility-administered medications on file prior to visit.          Social History:  Social History     Tobacco Use     Smoking status: Never Smoker     Smokeless tobacco: Never Used   Substance Use Topics     Alcohol use: No         Physical Exam:   There were no vitals filed for this visit.    GENERAL:   Alert. Oriented.  EYES: Clear  HENT:  Ears: R TM pearly gray. Normal landmarks. L TM pearly gray.  Normal landmarks  No tenderness with pressure or tugging on pinna or tragus either side.  Nose: Clear.  Sinuses: Nontender.  Oropharynx:  No erythema. No exudate.  NECK: Supple. No adenopathy.  LUNGS: Clear to ascultation.  No crackles.  No wheezing  HEART: RRR  SKIN:  No rash.  NEURO:  Pupils 2 mm, reactive. Extraocular movements intact, facial movements intact.  Sensation intact to light touch.  Hearing of rubbed fingers decreased on left.  Palate rises symmetrically. Shoulder shrug intact.     CHART REVIEW  DATE/place of visit: 12/6/2018 [facility]  DX: left ear pain  TESTS: head ct--negative  TREATMENT: none     Assessment/Plan:    1. Left ear pain  oxyCODONE-acetaminophen (PERCOCET/ENDOCET) 5-325 mg per tablet    Ambulatory referral to ENT        Sharp, shooting left ear pain.  Essentially normal neuro exam.  Normal CT head yesterday.    Discussed that I will give him some percocet but this is a short term prescription.  He is not clear on the timing and sequencing of his symptoms.  By our chart, he was seen in March/April for right otitis media.  He was not seen by [facility].  I did have the nursing assistant call [facility] and he was seen there in 6/2018.  They will be faxing the note.  However, from what I can tell from the note, that was an  evaluation for the right ear pain.  I referrred him back to ENT for evaluation of the left ear pain.  I discussed a possible diagnosis of neuralgia, but have opted to not start any medication for this until he has an ENT evaluation and possibly a neurological evaluation.  They state he does have an appointment with his primary in about 5 days.            Mesha Multani MD      12/7/2018    The following portions of the patient's history were reviewed and updated as appropriate: allergies, current medications, past family history, past medical history, past social history, past surgical history and problem list.
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
## 2021-06-23 NOTE — PROGRESS NOTES
ASSESMENT AND PLAN:  Diagnoses and all orders for this visit:    Otalgia of both ears  Improved with the topical eardrop treatment but I also think there is a component to less perseveration about the issue given the control of his manic episode.    Bipolar Disorder  Excellent improvement of recent manic episode compared to last visits.  This was achieved with a increase in his quetiapine and a decrease in his duloxetine.  Patient did not tolerate the mood stabilizer as detailed below.  Given his improvement, will continue with his current 400 mg of quetiapine at bedtime.  Follow-up if worsening.        SUBJECTIVE: 68-year-old male here for follow-up.  Since last visit, he stopped taking the new medication carbamazepine that had been prescribed to help with mood stabilization and the potential of neurogenic pain.  The patient had experienced some dizziness and for stopped the medication.  He continues to take his quetiapine 400 mg at bedtime.  The patient seems much calmer today as detailed below and both he and his wife have noticed improvement in his ordered thinking and irritability and sleep.  He is been sleeping very well this last few weeks.  He is getting about 8 hours at night.  The issues with the neighbors above detailed in previous notes continue but patient has been much better able to deal with the stresses.  His ear pain has resolved since he used the 1 week treatment with otic drops.    Past Medical History:   Diagnosis Date     Arthritis      Asthma      Bipolar affective disorder (H)      BPH (benign prostatic hyperplasia) 01/06/2015     CAD (coronary artery disease) 07/20/2015     Diabetes mellitus (H)      Dyslipidemia, goal LDL below 70 07/20/2015     Eczema      Essential hypertension      Tardive dyskinesia      Patient Active Problem List   Diagnosis     Subacute dyskinesia due to drug     Dyslipidemia, goal LDL below 70     Essential hypertension     Type 2 diabetes mellitus (H)     Bipolar  Disorder     Enlarged prostate with urinary obstruction     Coronary artery disease     Erectile dysfunction     Gait abnormality     Gastroesophageal reflux disease without esophagitis     Chronic back pain     Neuropathic pain     Current Outpatient Medications   Medication Sig Dispense Refill     acetaminophen (TYLENOL) 500 MG tablet Take 500 mg by mouth 2 (two) times a day.       aspirin 81 MG EC tablet Take 81 mg by mouth daily.       atenolol (TENORMIN) 50 MG tablet TAKE ONE TABLET BY MOUTH EVERY NIGHT AT BEDTIME 90 tablet 0     atorvastatin (LIPITOR) 20 MG tablet Take 1 tablet (20 mg total) by mouth daily. 90 tablet 3     blood glucose meter (GLUCOMETER) OneTouch Ultra. Dispense glucometer brand per patient's insurance at pharmacy discretion. Pt testing 3 times per day. 1 each 0     blood glucose test strips Dispense brand per patient's insurance at pharmacy discretion. TEST 3 times per day. 350 strip 3     cholecalciferol, vitamin D3, 1,000 unit tablet Take 2,000 Units by mouth every other day. Takes at 1200       clopidogrel (PLAVIX) 75 mg tablet TAKE ONE TABLET BY MOUTH ONE TIME DAILY  90 tablet 1     DULoxetine 40 mg CpDR Take 40 mg by mouth every morning. 30 capsule 6     generic lancets Dispense brand per patient's insurance at pharmacy discretion. TESTS 3 times per day 350 each 3     insulin needles, disposable, 31 Ndle 3 (three) times a day. For insulin injections.       insulin NPH-insulin regular (NOVOLIN 70/30 U-100 INSULIN) 100 unit/mL (70-30) injection Inject 85-90 Units under the skin 2 (two) times a day before meals. 50 mL 11     insulin syringe-needle U-100 1 mL 31 gauge x 5/16 Syrg USE AS DIRECTED ONCE DAILY 100 each 2     isosorbide mononitrate (IMDUR) 30 MG 24 hr tablet TAKE ONE TABLET BY MOUTH ONE TIME DAILY 90 tablet 3     lisinopril (PRINIVIL,ZESTRIL) 40 MG tablet Take 1 tablet (40 mg total) by mouth daily. 90 tablet 3     metFORMIN (GLUCOPHAGE) 1000 MG tablet TAKE ONE TABLET BY MOUTH 2  "TIMES A DAY WITH MEALS 180 tablet 3     nitroglycerin (NITROSTAT) 0.4 MG SL tablet Place 1 tablet (0.4 mg total) under the tongue every 5 (five) minutes as needed for chest pain. Max of 3 doses in 15 minutes. 25 tablet 1     omeprazole (PRILOSEC) 20 MG capsule Take 1 capsule (20 mg total) by mouth daily before breakfast. 90 capsule 3     QUEtiapine (SEROQUEL) 400 MG tablet Take 1 tablet (400 mg total) by mouth at bedtime. 30 tablet 6     tamsulosin (FLOMAX) 0.4 mg Cp24 Take 1 capsule (0.4 mg total) by mouth daily. 90 capsule 3     No current facility-administered medications for this visit.      Social History     Tobacco Use   Smoking Status Never Smoker   Smokeless Tobacco Never Used       OBJECTICE: /62   Pulse 71   Temp 98.3  F (36.8  C) (Oral)   Resp 16   Ht 6' 1\" (1.854 m)   Wt (!) 267 lb (121.1 kg)   SpO2 92%   BMI 35.23 kg/m       No results found for this or any previous visit (from the past 24 hour(s)).     GEN-alert, appropriate, in no apparent distress   HEENT-tympanic membranes and external auditory canals are normal on both sides   CV-regular rate and rhythm   RESP-lungs clear to auscultation   Psychiatric-appearance is well-groomed, speech is much less pressured, thought processing is much less tangential and there is less perseveration.  Mood is not depressed, affect is normal, thought content negative for suicidal or homicidal ideation.    Mahad Morales          "
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
## 2021-06-24 NOTE — TELEPHONE ENCOUNTER
Refill Approved    Rx renewed per Medication Renewal Policy.  Atenolol was last renewed on 12/8/18  #90 R-0.  Lisinopril was last renewed on 3/28/18  #90 R-3.    Last OV 2/11/19    Abbie Shah, [facility] Connection Triage/Med Refill 3/12/2019     Requested Prescriptions   Pending Prescriptions Disp Refills     atenolol (TENORMIN) 50 MG tablet [Pharmacy Med Name: Atenolol Oral Tablet 50 MG] 90 tablet 0     Sig: TAKE ONE TABLET BY MOUTH EVERY NIGHT AT BEDTIME    Beta-Blockers Refill Protocol Passed - 3/6/2019  7:02 AM       Passed - PCP or prescribing provider visit in past 12 months or next 3 months    Last office visit with prescriber/PCP: 2/11/2019 Mahad Morales MD OR same dept: 2/11/2019 Mahad Moarles MD OR same specialty: 2/11/2019 Mahad Morales MD  Last physical: 9/13/2017 Last MTM visit: Visit date not found   Next visit within 3 mo: Visit date not found  Next physical within 3 mo: Visit date not found  Prescriber OR PCP: Mahad Morales MD  Last diagnosis associated with med order: 1. Essential hypertension  - atenolol (TENORMIN) 50 MG tablet [Pharmacy Med Name: Atenolol Oral Tablet 50 MG]; TAKE ONE TABLET BY MOUTH EVERY NIGHT AT BEDTIME  Dispense: 90 tablet; Refill: 0  - lisinopril (PRINIVIL,ZESTRIL) 40 MG tablet [Pharmacy Med Name: Lisinopril Oral Tablet 40 MG]; Take 1 tablet (40 mg total) by mouth daily.  Dispense: 90 tablet; Refill: 2    If protocol passes may refill for 12 months if within 3 months of last provider visit (or a total of 15 months).            Passed - Blood pressure filed in past 12 months    BP Readings from Last 1 Encounters:   02/11/19 138/80             lisinopril (PRINIVIL,ZESTRIL) 40 MG tablet [Pharmacy Med Name: Lisinopril Oral Tablet 40 MG] 90 tablet 2     Sig: Take 1 tablet (40 mg total) by mouth daily.    Ace Inhibitors Refill Protocol Passed - 3/6/2019  7:02 AM       Passed - PCP or prescribing provider visit in past 12 months      Last office visit with prescriber/PCP:  2/11/2019 Mahad Moarles MD OR same dept: 2/11/2019 Mahad Morales MD OR same specialty: 2/11/2019 Mahad Morales MD  Last physical: 9/13/2017 Last MTM visit: Visit date not found   Next visit within 3 mo: Visit date not found  Next physical within 3 mo: Visit date not found  Prescriber OR PCP: Mahad Morales MD  Last diagnosis associated with med order: 1. Essential hypertension  - atenolol (TENORMIN) 50 MG tablet [Pharmacy Med Name: Atenolol Oral Tablet 50 MG]; TAKE ONE TABLET BY MOUTH EVERY NIGHT AT BEDTIME  Dispense: 90 tablet; Refill: 0  - lisinopril (PRINIVIL,ZESTRIL) 40 MG tablet [Pharmacy Med Name: Lisinopril Oral Tablet 40 MG]; Take 1 tablet (40 mg total) by mouth daily.  Dispense: 90 tablet; Refill: 2    If protocol passes may refill for 12 months if within 3 months of last provider visit (or a total of 15 months).            Passed - Serum Potassium in past 12 months    Lab Results   Component Value Date    Potassium 4.5 10/01/2018            Passed - Blood pressure filed in past 12 months    BP Readings from Last 1 Encounters:   02/11/19 138/80            Passed - Serum Creatinine in past 12 months    Creatinine   Date Value Ref Range Status   10/01/2018 1.07 0.70 - 1.30 mg/dL Final

## 2021-06-24 NOTE — TELEPHONE ENCOUNTER
Refill Approved    Rx renewed per Medication Renewal Policy. Medication was last renewed on 3/28/2018.    Lobito Ayon, [facility] Connection Triage/Med Refill 3/13/2019     Requested Prescriptions   Pending Prescriptions Disp Refills     tamsulosin (FLOMAX) 0.4 mg cap [Pharmacy Med Name: Tamsulosin HCl Oral Capsule 0.4 MG] 90 capsule 2     Sig: Take 1 capsule (0.4 mg total) by mouth daily.    Alfuzosin/Tamsulosin/Silodosin Refill Protocol  Passed - 3/8/2019  7:01 AM       Passed - PCP or prescribing provider visit in past 12 months      Last office visit with prescriber/PCP: 2/11/2019 Mahad Morales MD OR same dept: Visit date not found OR same specialty: Visit date not found  Last physical: 9/13/2017 Last MTM visit: 3/28/2018 Jose Manuel Pack, PharmD   Next visit within 3 mo: Visit date not found  Next physical within 3 mo: Visit date not found  Prescriber OR PCP: Mahad Morales MD  Last diagnosis associated with med order: 1. Enlarged prostate with urinary obstruction  - tamsulosin (FLOMAX) 0.4 mg cap [Pharmacy Med Name: Tamsulosin HCl Oral Capsule 0.4 MG]; Take 1 capsule (0.4 mg total) by mouth daily.  Dispense: 90 capsule; Refill: 2    2. Gastroesophageal reflux disease, esophagitis presence not specified  - omeprazole (PRILOSEC) 20 MG capsule [Pharmacy Med Name: Omeprazole Oral Capsule Delayed Release 20 MG]; Take 1 capsule (20 mg total) by mouth daily before breakfast.  Dispense: 90 capsule; Refill: 2    If protocol passes may refill for 12 months if within 3 months of last provider visit (or a total of 15 months).             omeprazole (PRILOSEC) 20 MG capsule [Pharmacy Med Name: Omeprazole Oral Capsule Delayed Release 20 MG] 90 capsule 2     Sig: Take 1 capsule (20 mg total) by mouth daily before breakfast.    GI Medications Refill Protocol Passed - 3/8/2019  7:01 AM       Passed - PCP or prescribing provider visit in last 12 or next 3 months.    Last office visit with prescriber/PCP: 2/11/2019 Mahad Morales  MD [facility] OR same dept: Visit date not found OR same specialty: Visit date not found  Last physical: 9/13/2017 Last MTM visit: 3/28/2018 Jose Manuel Pack PharmD   Next visit within 3 mo: Visit date not found  Next physical within 3 mo: Visit date not found  Prescriber OR PCP: Mahad Morales MD  Last diagnosis associated with med order: 1. Enlarged prostate with urinary obstruction  - tamsulosin (FLOMAX) 0.4 mg cap [Pharmacy Med Name: Tamsulosin HCl Oral Capsule 0.4 MG]; Take 1 capsule (0.4 mg total) by mouth daily.  Dispense: 90 capsule; Refill: 2    2. Gastroesophageal reflux disease, esophagitis presence not specified  - omeprazole (PRILOSEC) 20 MG capsule [Pharmacy Med Name: Omeprazole Oral Capsule Delayed Release 20 MG]; Take 1 capsule (20 mg total) by mouth daily before breakfast.  Dispense: 90 capsule; Refill: 2    If protocol passes may refill for 12 months if within 3 months of last provider visit (or a total of 15 months).

## 2021-06-24 NOTE — TELEPHONE ENCOUNTER
Refill Approved    Rx renewed per Medication Renewal Policy. Medication was last renewed on 3/28/18.    Bella Hirsch, [facility] Connection Triage/Med Refill 3/4/2019     Requested Prescriptions   Pending Prescriptions Disp Refills     atorvastatin (LIPITOR) 20 MG tablet [Pharmacy Med Name: Atorvastatin Calcium Oral Tablet 20 MG] 90 tablet 2     Sig: Take 1 tablet (20 mg total) by mouth daily.    Statins Refill Protocol (Hmg CoA Reductase Inhibitors) Passed - 3/3/2019  7:02 AM       Passed - PCP or prescribing provider visit in past 12 months     Last office visit with prescriber/PCP: 2/11/2019 Mahad Morales MD OR same dept: Visit date not found OR same specialty: Visit date not found  Last physical: 9/13/2017 Last MTM visit: 3/28/2018 Jose Manuel Pack, Rosy   Next visit within 3 mo: Visit date not found  Next physical within 3 mo: Visit date not found  Prescriber OR PCP: Mahad Morales MD  Last diagnosis associated with med order: 1. Dyslipidemia  - atorvastatin (LIPITOR) 20 MG tablet [Pharmacy Med Name: Atorvastatin Calcium Oral Tablet 20 MG]; Take 1 tablet (20 mg total) by mouth daily.  Dispense: 90 tablet; Refill: 2    If protocol passes may refill for 12 months if within 3 months of last provider visit (or a total of 15 months).

## 2021-06-25 NOTE — TELEPHONE ENCOUNTER
Prior Authorization Request    Who s requesting:  Pharmacy     Pharmacy Name and Location: [facility] pharmacy on [address] in [place]    Medication Name: freeStyle Thomas 14 day Sensor    Insurance Plan: [employer]    Insurance Member ID Number:  [id number]    CoverMyMeds Key: [id number]     Informed patient that prior authorizations can take up to 10 business days for response:   No     Okay to leave a detailed message: Yes         Call taken on 5/25/21 at 1255 pm by Lilliana Turner CMA, CMT

## 2021-06-25 NOTE — TELEPHONE ENCOUNTER
Central PA team  [phone]  Pool: HE PA MED ([id number])          PA has been initiated.       PA form completed and faxed insurance via Cover My Meds     Key:  [id number]     Medication:  FREESTYLE SALUD SENSOR    Insurance:  [employer]        Response will be received via fax and may take up to 5-10 business days depending on plan

## 2021-06-25 NOTE — PROGRESS NOTES
Progress Notes by Perla Posey MD at 1/31/2017 11:30 AM     Author: Perla Posey MD Service: -- Author Type: Physician    Filed: 1/31/2017 12:03 PM Encounter Date: 1/31/2017 Status: Signed    : Perla Posey MD (Physician)           Click to link to [facility] HEART CARE NOTE    Thank you, Dr. Morales, for asking the [facility] Heart Care team to see Mr. Mahad Orellana to follow-up on coronary artery disease and recent intervention.    Assessment/Recommendations   Assessment:    1.  Coronary artery disease, status post recent drug-eluting stent placement to the mid LAD and right posterior lateral artery for unstable angin. Since his procedure, he has had no further episodes of angina.  At this point, would continue with his medical therapy as is.  He will need to remain on dual antiplatelet therapy for minimum of 1 year.  2.  Hypertension, well controlled  3.  Hyperlipidemia, well controlled on current statin      Plan:  1.  Continue current medications  2.  Follow-up in 1 year or sooner if clinically indicated       History of Present Illness    Mr. Mahad Orellana is a 67 y.o. male with history of known coronary artery disease, status post previous stenting of the proximal to mid left anterior descending and right coronary artery who I recently saw for complaints of accelerating chest pain.  Just prior to that visit, he mentioned having a black tarry stool raising concern of GI bleeding which might precipitate worsening angina.  I did check a hemoglobin which was stable from previous checks.  I recommended that we proceed ahead with repeat angiography which showed evidence of restenosis in the mid LAD just beyond the previously placed stent.  He was also found to have significant stenosis in a adjacent diagonal branch, as well as tight stenosis in the right posterolateral artery.  He underwent successful drug-eluting stent placement to the mid LAD and right posterior lateral artery,  along with angioplasty of a diagonal branch.  Since his procedure, he states he has been feeling great.  He has had no further angina.    ECG (personally reviewed): No ECG today    ECHO (personally reviewed): No recent echo      Physical Examination Review of Systems   Vitals:    01/31/17 1130   BP: 112/64   Pulse: 62   Resp: 18   SpO2: 97%     Body mass index is 33.12 kg/(m^2).  Wt Readings from Last 3 Encounters:   01/31/17 (!) 251 lb (113.9 kg)   01/12/17 (!) 252 lb (114.3 kg)   01/04/17 (!) 252 lb (114.3 kg)     General Appearance:   Awake, Alert, No acute distress.   HEENT:  No scleral icterus; the mucous membranes were pink and moist.   Neck: No cervical bruits or jugular venous distention    Chest: The spine was straight. The chest was symmetric.   Lungs:   Respirations unlabored; the lungs are clear to auscultation. No wheezing   Cardiovascular:    regular rate and rhythm.  S1, S2 normal.  No murmur, gallop or rub    Abdomen:  No organomegaly, masses, bruits, or tenderness. Bowels sounds are present   Extremities:  no peripheral edema bilaterally    Skin: No xanthelasma. Warm, Dry.   Musculoskeletal: No tenderness.   Neurologic: Mood and affect are appropriate.    General: WNL  Eyes: WNL  Ears/Nose/Throat: WNL  Lungs: WNL  Heart: WNL  Stomach: WNL  Bladder: WNL  Muscle/Joints: WNL  Skin: WNL  Nervous System: WNL  Mental Health: Depression     Blood: WNL     Medical History  Surgical History Family History Social History   Past Medical History   Diagnosis Date   ? Arthritis    ? Asthma    ? Bipolar affective disorder    ? BPH (benign prostatic hyperplasia) 01/06/2015   ? CAD (coronary artery disease) 07/20/2015   ? Diabetes mellitus    ? Dyslipidemia, goal LDL below 70 07/20/2015   ? Eczema    ? Essential hypertension    ? Tardive dyskinesia     Past Surgical History   Procedure Laterality Date   ? Coronary stent placement  07/20/2015     LAD x 2 and RCA with CHAYA by Dr. Junior   ? Cardiac catheterization N/A  12/19/2016     Procedure: Coronary Angiogram;  Surgeon: Ozzy Junior MD;  Location: [facility];  Service:     Family History   Problem Relation Age of Onset   ? Diabetes Mother    ? Sudden death Father 75   ? Diabetes Brother    ? Heart disease Maternal Grandmother    ? No Medical Problems Brother    ? No Medical Problems Son    ? No Medical Problems Son    ? No Medical Problems Daughter     Social History     Social History   ? Marital status:      Spouse name: Rosie   ? Number of children: 3   ? Years of education: N/A     Occupational History   ?  Retired     Social History Main Topics   ? Smoking status: Never Smoker   ? Smokeless tobacco: Never Used   ? Alcohol use No   ? Drug use: No   ? Sexual activity: Not on file     Other Topics Concern   ? Not on file     Social History Narrative          Medications  Allergies   Current Outpatient Prescriptions   Medication Sig Dispense Refill   ? acetaminophen (TYLENOL) 500 MG tablet Take 500 mg by mouth 2 (two) times a day.     ? ascorbic acid, vitamin C, (VITAMIN C) 125 mg Chew Chew 3 tablets daily.     ? aspirin 81 MG EC tablet Take 81 mg by mouth daily.     ? atenolol (TENORMIN) 50 MG tablet TAKE 1 TABLET (50 MG TOTAL) BY MOUTH BEDTIME. 90 tablet 3   ? b complex vitamins tablet Take 1 tablet by mouth daily with lunch.      ? cholecalciferol, vitamin D3, 1,000 unit tablet Take 2,000 Units by mouth every other day. Takes at 1200     ? clopidogrel (PLAVIX) 75 mg tablet Take 1 tablet (75 mg total) by mouth daily. 90 tablet 4   ? CONTOUR NEXT STRIPS strips TEST BLOOD SUGAR UP TO FOUR TIMES A DAY AND RECORD RESULTS. 350 each 0   ? diphenhydrAMINE (BENADRYL) 50 MG capsule TAKE TWO CAPSULES BY MOUTH NIGHTLY AT BEDTIME AS NEEDED 180 capsule 3   ? DULoxetine (CYMBALTA) 60 MG capsule Take 1 capsule (60 mg total) by mouth daily with lunch. 90 capsule 3   ? haloperidol (HALDOL) 1 MG tablet TAKE 2 TABLET (2 MG TOTAL) BY MOUTH BEDTIME. 180 tablet 2   ? insulin  needles, disposable, 31 Ndle 3 (three) times a day. For insulin injections.     ? insulin regular (HUMULIN/NOVOLIN) 100 unit/mL injection Inject 20 Units under the skin daily with lunch.     ? insulin regular (HUMULIN/NOVOLIN) 100 unit/mL injection Inject 22 Units under the skin daily with supper.     ? insulin syringe-needle U-100 1 mL 31 gauge x 5/16 Syrg USE AS DIRECTED ONCE DAILY 100 each 2   ? INULIN (FIBER GUMMIES ORAL) Take by mouth bedtime. Takes 2 gummies at night     ? isosorbide mononitrate (IMDUR) 30 MG 24 hr tablet Take 1 tablet (30 mg total) by mouth daily. 90 tablet 3   ? LACTOBACILLUS ACIDOPHILUS (PROBIOTIC ORAL) Take 2 tablets by mouth daily with lunch.      ? lisinopril (PRINIVIL,ZESTRIL) 20 MG tablet Take 2 tablets (40 mg total) by mouth daily with lunch. 180 tablet 3   ? magnesium 250 mg Tab Take 1 tablet by mouth every other day. Takes at 1200     ? metFORMIN (GLUCOPHAGE) 1000 MG tablet TAKE ONE TABLET BY MOUTH TWICE DAILY 180 tablet 1   ? MULTIVITAMIN (MULTIPLE VITAMIN ORAL) Take 1 tablet by mouth every other day. Takes at 1200     ? NEEDLES, INSULIN DISPOSABLE (BD ULTRA-FINE ALISHA PEN NEEDLES MISC) Use four times a day.     ? nitroglycerin (NITROSTAT) 0.4 MG SL tablet Place 1 tablet (0.4 mg total) under the tongue every 5 (five) minutes as needed for chest pain. 25 tablet 0   ? NOVOLIN N 100 unit/mL injection Inject under the skin. 26 units at noon and 10pm 10 mL 0   ? omeprazole (PRILOSEC) 20 MG capsule Take 1 capsule (20 mg total) by mouth daily. 90 capsule 4   ? simvastatin (ZOCOR) 40 MG tablet TAKE  ONE TABLET BY MOUTH EVERY NIGHT AT BEDTIME 90 tablet 89   ? SYRING W-NDL,DISP,INSUL,0.5 ML (INSULIN SYRINGE MISC) Use As Directed.     ? tamsulosin (FLOMAX) 0.4 mg Cp24 TAKE 1 CAPSULE (0.4 MG TOTAL) BY MOUTH DAILY. 90 capsule 2   ? UBIDECARENONE (COQ-10 ORAL) Take 100 mg by mouth bedtime.       No current facility-administered medications for this visit.       Allergies   Allergen Reactions   ?  Heparin Analogues      Severe recurrent bloody noses         Lab Results    Chemistry/lipid CBC Cardiac Enzymes/BNP/TSH/INR   Lab Results   Component Value Date    CHOL 134 12/19/2016    HDL 27 (L) 12/19/2016    LDLCALC 50 12/19/2016    TRIG 286 (H) 12/19/2016    CREATININE 0.95 12/19/2016    BUN 12 12/19/2016    K 4.4 12/19/2016     12/19/2016     12/19/2016    CO2 20 (L) 12/19/2016    Lab Results   Component Value Date    WBC 8.4 12/19/2016    HGB 13.8 (L) 12/19/2016    HCT 37.5 (L) 12/19/2016    MCV 88 12/19/2016     12/19/2016    Lab Results   Component Value Date    CKMB 2 05/08/2016    TROPONINI <0.01 05/22/2016    BNP 17 05/21/2016    TSH 2.4 07/16/2010    INR 1.03 05/21/2016

## 2021-06-26 NOTE — TELEPHONE ENCOUNTER
Reviewed form. This is a Appeal Form from [facility] and there is nothing MD needs to do with this and nowhere for a provider to sign. Could not reach Rosie to update her regarding this. Left detailed message.     I did fill in the name of Prescriber (Dr. Morales) on form. Nothing else to do. Taken back to [facility] .

## 2021-06-26 NOTE — PROGRESS NOTES
Progress Notes by Perla Posey MD at 1/18/2018  2:10 PM     Author: Perla Posey MD Service: -- Author Type: Physician    Filed: 1/18/2018  2:38 PM Encounter Date: 1/18/2018 Status: Signed    : Perla Posey MD (Physician)           Click to link to [facility] Heart [facility] HEART CARE NOTE    Thank you, Dr. Morales, for asking the [facility] Heart Care team to see Mr. Mahad Orellana to follow-up on coronary artery disease and mixed hyperlipidemia.    Assessment/Recommendations   Assessment:    1.  Coronary artery disease, stable.  Patient last underwent percutaneous intervention in December 2016 with repeat stenting of the mid LAD and right posterior lateral branch as well as PTCA of the first diagonal branch.  He has done well over the year with only 2 episodes of chest discomfort requiring a sublingual nitroglycerin.  His last use of nitro was 6 months ago.  At this point, would favor continued medical management.  I suggested we continue Plavix for another year as he is having no adverse issues.  2.  Mixed hyperlipidemia.  His last lipid profile in December 2016 demonstrated a high triglyceride level of 286, low HDL of 27.  His LDL was excellent at 50.  Would continue his current dose of simvastatin.  3.  Essential hypertension, controlled    Plan:  1.  Continue current medications  2.  Follow-up in 1 year or sooner if new symptoms       History of Present Illness    Mr. Mahad Orellana is a 67 y.o. male with 3 of coronary artery disease with previous stenting of the LAD and right posterior lateral branch, mixed hyperlipidemia, type 2 diabetes mellitus and essential hypertension returns to the office today for routine follow-up visit.  He tells me he has done well from a heart standpoint over the last year.  He has utilized sublingual nitroglycerin on only 2 occasions, the last being 6 months ago.  He denies any exertional dyspnea, orthopnea, PND or lower extremity edema.  His biggest issue is that  his gait is poor.  He begins walking and goes up in his toes stumbling forward and falling.  He recently got a cane which is helping.  His primary physician believes this may be a complication of long-term Haldol which has been stopped.  He did recommend consultation with neurology although the patient has not seen anybody.    ECG (personally reviewed): No ECG today    Cardiac Imaging Studies (personally reviewed): No new cardiac imaging     Physical Examination Review of Systems   Vitals:    01/18/18 1408   BP: 100/68   Pulse: 68   Resp: 16     Body mass index is 34.41 kg/(m^2).  Wt Readings from Last 3 Encounters:   01/18/18 (!) 259 lb (117.5 kg)   12/06/17 (!) 258 lb 12 oz (117.4 kg)   11/10/17 (!) 257 lb 4 oz (116.7 kg)     General Appearance:   Awake, Alert, No acute distress.   HEENT:  No scleral icterus; the mucous membranes were pink and moist.   Neck: No cervical bruits or jugular venous distention    Chest: The spine was straight. The chest was symmetric.   Lungs:   Respirations unlabored; the lungs are clear to auscultation. No wheezing   Cardiovascular:    Regular rate and rhythm.  S1, S2 normal.  No murmur, gallop or rub   Abdomen:  No organomegaly, masses, bruits, or tenderness. Bowels sounds are present   Extremities:  No peripheral edema bilaterally   Skin: No xanthelasma. Warm, Dry.   Musculoskeletal: No tenderness.   Neurologic: Mood and affect are appropriate.    General: WNL  Eyes: WNL  Ears/Nose/Throat: WNL  Lungs: WNL  Heart: WNL  Stomach: Heartburn  Bladder: WNL  Muscle/Joints: Joint Pain, Muscle Weakness, Muscle Pain  Skin: WNL  Nervous System: Falls, Loss of Balance  Mental Health: Depression, Anxiety     Blood: WNL     Medical History  Surgical History Family History Social History   Past Medical History:   Diagnosis Date   ? Arthritis    ? Asthma    ? Bipolar affective disorder    ? BPH (benign prostatic hyperplasia) 01/06/2015   ? CAD (coronary artery disease) 07/20/2015   ? Diabetes  mellitus    ? Dyslipidemia, goal LDL below 70 07/20/2015   ? Eczema    ? Essential hypertension    ? Tardive dyskinesia     Past Surgical History:   Procedure Laterality Date   ? CARDIAC CATHETERIZATION N/A 12/19/2016    Procedure: Coronary Angiogram;  Surgeon: Ozzy Junior MD;  Location: [facility];  Service:    ? CORONARY STENT PLACEMENT  07/20/2015    LAD x 2 and RCA with CHAYA by Dr. Junior    Family History   Problem Relation Age of Onset   ? Diabetes Mother    ? Sudden death Father 75   ? Diabetes Brother    ? Heart disease Maternal Grandmother    ? No Medical Problems Brother    ? No Medical Problems Son    ? No Medical Problems Son    ? No Medical Problems Daughter     Social History     Social History   ? Marital status:      Spouse name: Rosie   ? Number of children: 3   ? Years of education: N/A     Occupational History   ?  Retired     Social History Main Topics   ? Smoking status: Never Smoker   ? Smokeless tobacco: Never Used   ? Alcohol use No   ? Drug use: No   ? Sexual activity: Not on file     Other Topics Concern   ? Not on file     Social History Narrative          Medications  Allergies   Current Outpatient Prescriptions   Medication Sig Dispense Refill   ? acetaminophen (TYLENOL) 500 MG tablet Take 500 mg by mouth 2 (two) times a day.     ? albuterol (PROAIR HFA;PROVENTIL HFA;VENTOLIN HFA) 90 mcg/actuation inhaler Inhale 1-2 puffs every 4 (four) hours as needed for wheezing. 1 each 1   ? ascorbic acid, vitamin C, (VITAMIN C) 125 mg Chew Chew 3 tablets daily.     ? aspirin 81 MG EC tablet Take 81 mg by mouth daily.     ? atenolol (TENORMIN) 50 MG tablet Take 1 tablet (50 mg total) by mouth at bedtime. 90 tablet 0   ? b complex vitamins tablet Take 1 tablet by mouth daily with lunch.      ? blood glucose test strips Dispense brand per patient's insurance at pharmacy discretion. TEST 3 times per day. 350 strip 3   ? blood-glucose meter (ONETOUCH VERIO IQ METER) Misc Use 1 Device  As Directed 3 (three) times a day. 1 each 0   ? cholecalciferol, vitamin D3, 1,000 unit tablet Take 2,000 Units by mouth every other day. Takes at 1200     ? clopidogrel (PLAVIX) 75 mg tablet Take 1 tablet (75 mg total) by mouth daily. 90 tablet 3   ? CONTOUR NEXT STRIPS strips TEST BLOOD SUGAR UP TO FOUR TIMES A DAY AND RECORD RESULTS. 350 each 1   ? cyclobenzaprine (FLEXERIL) 10 MG tablet Take 1 tablet (10 mg total) by mouth 2 (two) times a day as needed for muscle spasms. 20 tablet 0   ? cyclobenzaprine (FLEXERIL) 5 MG tablet Take 1 tablet (5 mg total) by mouth 3 (three) times a day as needed for muscle spasms. 90 tablet 3   ? diphenhydrAMINE (BENADRYL) 50 MG capsule TAKE TWO CAPSULES BY MOUTH NIGHTLY AT BEDTIME AS NEEDED 180 capsule 3   ? DULoxetine (CYMBALTA) 60 MG capsule TAKE 1 CAPSULE (60 MG TOTAL)  BY MOUTH TWICE PER DAY. 180 capsule 3   ? famotidine (PEPCID) 40 MG tablet Take 1 tablet (40 mg total) by mouth every morning. 90 tablet 1   ? generic lancets Dispense brand per patient's insurance at pharmacy discretion. TESTS 3 times per day 350 each 3   ? insulin needles, disposable, 31 Ndle 3 (three) times a day. For insulin injections.     ? insulin regular (HUMULIN/NOVOLIN) 100 unit/mL injection Inject under the skin. 22 units at am and 32 units in the evening     ? insulin syringe-needle U-100 1 mL 31 gauge x 5/16 Syrg USE AS DIRECTED ONCE DAILY 100 each 2   ? INULIN (FIBER GUMMIES ORAL) Take by mouth bedtime. Takes 2 gummies at night     ? isosorbide mononitrate (IMDUR) 30 MG 24 hr tablet TAKE ONE TABLET BY MOUTH ONCE DAILY 90 tablet 2   ? LACTOBACILLUS ACIDOPHILUS (PROBIOTIC ORAL) Take 2 tablets by mouth daily with lunch.      ? lisinopril (PRINIVIL,ZESTRIL) 20 MG tablet TAKE 2 TABLETS (40 MG TOTAL)  BY MOUTH DAILY WITH LUNCH. 180 tablet 2   ? magnesium 250 mg Tab Take 1 tablet by mouth every other day. Takes at 1200     ? metFORMIN (GLUCOPHAGE) 1000 MG tablet Take 1 tablet (1,000 mg total) by mouth 2  (two) times a day with meals. 180 tablet 3   ? MULTIVITAMIN (MULTIPLE VITAMIN ORAL) Take 1 tablet by mouth every other day. Takes at 1200     ? NEEDLES, INSULIN DISPOSABLE (BD ULTRA-FINE ALISHA PEN NEEDLES MISC) Use four times a day.     ? nitroglycerin (NITROSTAT) 0.4 MG SL tablet DISSOLVE 1 TAB. UNDER TONGUE AS NEED EVERY 5 MIN. & CALL 911 IF NO RELEIF. CONTINUE 1 TAB./5 MIN. UNTIL HELP COMES 25 tablet 2   ? NOVOLIN N 100 unit/mL injection Inject 31 Units under the skin 2 (two) times a day before meals. (Patient taking differently: Inject under the skin 2 (two) times a day before meals. 31 units at AM and 31 units at HS) 10 mL 6   ? QUEtiapine (SEROQUEL) 300 MG tablet Take 1 tablet (300 mg total) by mouth at bedtime. 90 tablet 3   ? simvastatin (ZOCOR) 40 MG tablet TAKE ONE TABLET BY MOUTH EVERY NIGHT AT BEDTIME 90 tablet 2   ? SYRING W-NDL,DISP,INSUL,0.5 ML (INSULIN SYRINGE MISC) Use As Directed.     ? tamsulosin (FLOMAX) 0.4 mg Cp24 TAKE ONE CAPSULE BY MOUTH ONCE DAILY 90 capsule 1   ? traMADol (ULTRAM) 50 mg tablet Take 1 tablet (50 mg total) by mouth every 8 (eight) hours as needed for pain. 60 tablet 1   ? triamcinolone (KENALOG) 0.1 % cream Apply topically 2 (two) times a day. For up to 3 weeks 80 g 1   ? UBIDECARENONE (COQ-10 ORAL) Take 100 mg by mouth bedtime.     ? metoprolol succinate (TOPROL XL) 100 MG 24 hr tablet Take 1 tablet (100 mg total) by mouth daily. 90 tablet 3     No current facility-administered medications for this visit.       Allergies   Allergen Reactions   ? Heparin Analogues      Severe recurrent bloody noses         Lab Results    Chemistry/lipid CBC Cardiac Enzymes/BNP/TSH/INR   Lab Results   Component Value Date    CHOL 134 12/19/2016    HDL 27 (L) 12/19/2016    LDLCALC 50 12/19/2016    TRIG 286 (H) 12/19/2016    CREATININE 0.88 02/24/2017    BUN 16 02/24/2017    K 5.0 02/24/2017     (L) 02/24/2017     02/24/2017    CO2 21 (L) 02/24/2017    Lab Results   Component Value  Date    WBC 9.4 02/24/2017    HGB 14.2 02/24/2017    HCT 39.3 (L) 02/24/2017    MCV 89 02/24/2017     02/24/2017    Lab Results   Component Value Date    CKMB 2 05/08/2016    TROPONINI <0.01 02/24/2017    BNP 17 05/21/2016    TSH 2.4 07/16/2010    INR 1.03 05/21/2016

## 2021-06-26 NOTE — TELEPHONE ENCOUNTER
Please inform Rosie that there is nothing that we can do, the only way that they will allow an appeal is if the patient has a allergy to the alternative covered treatment which is not possible in this case.  I think it would be okay for him to reduce the fingerstick testing to 2 times per day.  Thanks.

## 2021-06-26 NOTE — TELEPHONE ENCOUNTER
Rosie returned call. TC relay message below. Caller verbalizes understanding and request we can mail original to [employer], [address], [place] [address]. TC made a copy for medical records scanning and mailed original.

## 2021-06-26 NOTE — TELEPHONE ENCOUNTER
Reason for Call:  Form, our goal is to have forms completed with 72 hours, however, some forms may require a visit or additional information.    Type of letter, form or note:  [facility] Appeal Form    Who is the form from?: Insurance comp    Where did the form come from: Patient or family brought in       What clinic location was the form placed at?: [place]    Where the form was placed: 's Box    What number is listed as a contact on the form?: Rosie Orellana [phone]       Additional comments: Wife just wants Dr. Morales to fill out the form mail it to [facility] in the envelope enclosed.    Call taken on 6/18/2021 at 11:42 AM by Юлия Lopez

## 2021-06-26 NOTE — TELEPHONE ENCOUNTER
Wife Rosie called asking if PCP can help with the appeal. Pt is frustrated that this has been denied and had worked so well in the past. He doesn't want to have to poke his fingers 3x a day and use his meter. Can an appeal be initiated? They're asking this to be done asap and please call Rosie back with an update. Thank you.

## 2021-06-26 NOTE — TELEPHONE ENCOUNTER
"SHAWNA called Rosie and notified per MD note below. Rosie verbally expressed some frustration with this as the patient has Bipolar mood and gets very upset with painful pricks and pokes. I reviewed the notes below with Rosie. Patient was also on phone for several minutes with this author and then handed phone back to Rosie. Rosie states that the patient has one sensor left and will then be out and will then \"not be doing anything with the checking and blood sugar. We will have to guess.\" SHAWNA agreed to send note to MD for review.   "

## 2021-06-27 NOTE — PROGRESS NOTES
Progress Notes by Perla Posey MD at 6/11/2019  1:10 PM     Author: Perla Posey MD Service: -- Author Type: Physician    Filed: 6/11/2019  4:15 PM Encounter Date: 6/11/2019 Status: Signed    : Perla Posey MD (Physician)           Click to link to [facility] Heart [facility] HEART CARE NOTE    Thank you, Dr. Morales, for asking the [facility] Heart Care team to see Mr. Mahad Orellana to follow-up on coronary artery disease and mixed hyperlipidemia.    Assessment/Recommendations   Assessment:    1.  Coronary artery disease, stable.  Patient reports no anginal symptoms since his last intervention in December 2016.  He reports no use of sublingual nitroglycerin since I last saw him.  At this point, continue with medical management and aggressive risk factor modification.  I did tell him it would be appropriate to discontinue Plavix at this time although he seems quite concerned about this.  As he is having no adverse issues i.e. bleeding, I told him he could remain on.  He will discuss further with his primary physician.  2.  Mixed hyperlipidemia.  He is due for repeat lipid profile which was last done May 2018 demonstrating a direct LDL of 55.  At this point no indication for change.  3.  Essential hypertension, controlled      Plan:  1.  Continue current medications although could discontinue Plavix at this point  2.  Follow-up in 1 year or sooner if new symptoms       History of Present Illness    Mr. Mahad Orellana is a 69 y.o. male with history of coronary artery disease, status post stenting of the left anterior descending and right posterior lateral branch in December 2016, mixed hyperlipidemia, essential hypertension who presents today for routine visit.  He tells me he is doing well from a cardiac standpoint.  He denies any exertional chest discomfort or need for sublingual nitroglycerin.  He also denies orthopnea, PND or lower extremity edema.  His activity has become extremely limited because  of gait issues.  He spends majority of the day watching TV.    ECG (personally reviewed): No ECG today    Cardiac Imaging Studies (personally reviewed): No recent cardiac imaging     Physical Examination Review of Systems   Vitals:    06/11/19 1322   BP: 140/76   Pulse: 68   Resp: 16     Body mass index is 35.36 kg/m .  Wt Readings from Last 3 Encounters:   06/11/19 (!) 268 lb (121.6 kg)   02/11/19 (!) 267 lb (121.1 kg)   01/14/19 (!) 267 lb (121.1 kg)     General Appearance:   Awake, Alert, No acute distress.   HEENT:  No scleral icterus; the mucous membranes were pink and moist.   Neck: No cervical bruits or jugular venous distention    Chest: The spine was straight. The chest was symmetric.   Lungs:   Respirations unlabored; the lungs are clear to auscultation. No wheezing   Cardiovascular:    Regular rate and rhythm.  S1, S2 normal.  No murmur or gallop   Abdomen:  No organomegaly, masses, bruits, or tenderness. Bowels sounds are present   Extremities:  No peripheral edema bilaterally.   Skin: No xanthelasma. Warm, Dry.   Musculoskeletal: No tenderness.   Neurologic: Mood and affect are appropriate.    General: WNL  Eyes: WNL  Ears/Nose/Throat: WNL  Lungs: Snoring  Heart: WNL  Stomach: WNL  Bladder: WNL  Muscle/Joints: Joint Pain, Muscle Weakness, Muscle Pain  Skin: WNL  Nervous System: WNL  Mental Health: Depression     Blood: WNL     Medical History  Surgical History Family History Social History   Past Medical History:   Diagnosis Date   ? Arthritis    ? Asthma    ? Bipolar affective disorder (H)    ? BPH (benign prostatic hyperplasia) 01/06/2015   ? CAD (coronary artery disease) 07/20/2015   ? Diabetes mellitus (H)    ? Dyslipidemia, goal LDL below 70 07/20/2015   ? Eczema    ? Essential hypertension    ? Tardive dyskinesia     Past Surgical History:   Procedure Laterality Date   ? CARDIAC CATHETERIZATION N/A 12/19/2016    Procedure: Coronary Angiogram;  Surgeon: Ozzy Junior MD;  Location: [facility]  Cath Lab;  Service:    ? CORONARY STENT PLACEMENT  07/20/2015    LAD x 2 and RCA with CHAYA by Dr. Junior    Family History   Problem Relation Age of Onset   ? Diabetes Mother    ? Sudden death Father 75   ? Diabetes Brother    ? Heart disease Maternal Grandmother    ? No Medical Problems Brother    ? No Medical Problems Son    ? No Medical Problems Son    ? No Medical Problems Daughter     Social History     Socioeconomic History   ? Marital status:      Spouse name: Rosie   ? Number of children: 3   ? Years of education: Not on file   ? Highest education level: Not on file   Occupational History     Employer: RETIRED   Social Needs   ? Financial resource strain: Not on file   ? Food insecurity:     Worry: Not on file     Inability: Not on file   ? Transportation needs:     Medical: Not on file     Non-medical: Not on file   Tobacco Use   ? Smoking status: Never Smoker   ? Smokeless tobacco: Never Used   Substance and Sexual Activity   ? Alcohol use: No   ? Drug use: No   ? Sexual activity: Not on file   Lifestyle   ? Physical activity:     Days per week: Not on file     Minutes per session: Not on file   ? Stress: Not on file   Relationships   ? Social connections:     Talks on phone: Not on file     Gets together: Not on file     Attends [employer]ism service: Not on file     Active member of club or organization: Not on file     Attends meetings of clubs or organizations: Not on file     Relationship status: Not on file   ? Intimate partner violence:     Fear of current or ex partner: Not on file     Emotionally abused: Not on file     Physically abused: Not on file     Forced sexual activity: Not on file   Other Topics Concern   ? Not on file   Social History Narrative   ? Not on file          Medications  Allergies   Current Outpatient Medications   Medication Sig Dispense Refill   ? acetaminophen (TYLENOL) 500 MG tablet Take 500 mg by mouth 2 (two) times a day.     ? aspirin 81 MG EC tablet Take 81 mg by  mouth daily.     ? atenolol (TENORMIN) 50 MG tablet TAKE ONE TABLET BY MOUTH EVERY NIGHT AT BEDTIME 90 tablet 3   ? atorvastatin (LIPITOR) 20 MG tablet Take 1 tablet (20 mg total) by mouth daily. 90 tablet 3   ? blood glucose meter (GLUCOMETER) OneTouch Ultra. Dispense glucometer brand per patient's insurance at pharmacy discretion. Pt testing 3 times per day. 1 each 0   ? cholecalciferol, vitamin D3, 1,000 unit tablet Take 2,000 Units by mouth every other day. Takes at 1200     ? clopidogrel (PLAVIX) 75 mg tablet TAKE ONE TABLET BY MOUTH ONE TIME DAILY  90 tablet 1   ? DULoxetine 40 mg CpDR Take 40 mg by mouth every morning. 30 capsule 6   ? generic lancets Dispense brand per patient's insurance at pharmacy discretion. TESTS 3 times per day 350 each 3   ? insulin needles, disposable, 31 Ndle 3 (three) times a day. For insulin injections.     ? insulin NPH-insulin regular (NOVOLIN 70/30 U-100 INSULIN) 100 unit/mL (70-30) injection Inject 85-90 Units under the skin 2 (two) times a day before meals. 50 mL 11   ? insulin syringe-needle U-100 1 mL 31 gauge x 5/16 Syrg USE AS DIRECTED ONCE DAILY 100 each 2   ? isosorbide mononitrate (IMDUR) 30 MG 24 hr tablet TAKE ONE TABLET BY MOUTH ONE TIME DAILY 90 tablet 3   ? lisinopril (PRINIVIL,ZESTRIL) 40 MG tablet Take 1 tablet (40 mg total) by mouth daily. 90 tablet 3   ? metFORMIN (GLUCOPHAGE) 1000 MG tablet TAKE ONE TABLET BY MOUTH 2 TIMES A DAY WITH MEALS 180 tablet 3   ? nitroglycerin (NITROSTAT) 0.4 MG SL tablet Place 1 tablet (0.4 mg total) under the tongue every 5 (five) minutes as needed for chest pain. Max of 3 doses in 15 minutes. 25 tablet 1   ? omeprazole (PRILOSEC) 20 MG capsule Take 1 capsule (20 mg total) by mouth daily before breakfast. 90 capsule 3   ? ONETOUCH ULTRA BLUE TEST STRIP strips TEST 3 times per day. 350 strip 3   ? QUEtiapine (SEROQUEL) 400 MG tablet Take 1 tablet (400 mg total) by mouth at bedtime. 30 tablet 6   ? tamsulosin (FLOMAX) 0.4 mg cap  Take 1 capsule (0.4 mg total) by mouth daily. 90 capsule 3     No current facility-administered medications for this visit.       Allergies   Allergen Reactions   ? Heparin Analogues      Severe recurrent bloody noses         Lab Results    Chemistry/lipid CBC Cardiac Enzymes/BNP/TSH/INR   Lab Results   Component Value Date    CHOL 146 05/23/2018    HDL 26 (L) 05/23/2018    LDLCALC  05/23/2018      Comment:      Invalid, Triglycerides >400    TRIG 579 (H) 05/23/2018    CREATININE 1.07 10/01/2018    BUN 14 10/01/2018    K 4.5 10/01/2018     10/01/2018     10/01/2018    CO2 20 (L) 10/01/2018    Lab Results   Component Value Date    WBC 9.4 02/24/2017    HGB 14.2 02/24/2017    HCT 39.3 (L) 02/24/2017    MCV 89 02/24/2017     02/24/2017    Lab Results   Component Value Date    CKMB 2 05/08/2016    TROPONINI <0.01 02/24/2017    BNP 17 05/21/2016    TSH 2.4 07/16/2010    INR 1.03 05/21/2016

## 2021-06-30 NOTE — PROGRESS NOTES
Progress Notes by Petra Lucas RD at 5/10/2021  2:00 PM     Author: Petra Lucas RD Service: -- Author Type: Registered Dietitian    Filed: 5/11/2021  8:36 AM Encounter Date: 5/10/2021 Status: Attested    : Petra Lucas RD (Registered Dietitian) Cosigner: Mahad Morales MD at 5/17/2021  2:38 PM    Attestation signed by Mahad Morales MD at 5/17/2021  2:38 PM    Agree, thanks.                  Assessment: Saulo is here for follow up diabetes education, his spouse, Rosie  accompanies him.   He currently uses 90 units Relion Novolin 70/30 insulin twice daily.  He states BG have improved with this new dose.   Also takes 1,000 mg metformin bid.  He reports not forgetting to take his insulin - Rosie does most of the caregiving.  He also uses the Freestyle avery, typically scans twice daily so missing some overnight readings.  Summary:  25% in target range,  37% 180 -250 mg/dl   37% >250  mg/dl   He battles uncontrolled bipolar disorder so frequently gets off track.  He is sedentary, comes in wheelchair.   Poor diet,  twinkies for breakfast, fast food for lunch and [employer] dinner.  Using Relion vial/syringe due to financial concerns.  He has been taking  Relion Novolin 70/30 insulin at 11am breakfast and 11 pm before bed.             Food recall:   Twinkies                   Lunch:  [employer]   valdes hamburger                                                                 Dinner :  [employer] meal       Plan: Reviewed BG targets and increased risk of diabetes complications, symptoms of hyperglycemia discussed.  Following protocol, to increase insulin by 10%.  To take 100 units  Relion Novolin 70/30 insulin 20 minutes before breakfast and 100 units   Relion Novolin 70/30 insulin 20 minutes before evening meal at 6-7 pm.   Discussed If requiring  200 units or greater daily insulin he may be good candidate for U500.  However, when reaching [facility] this may not be an affordable option.   Reviewed signs/treatment for hypoglycemia. Follow up scheduled in 3-4 weeks.  Will have A1c done today.    Goals:  1) have ww toast and PB instead of Twinkies  2) one meal per week at home for a sandwich instead of Fast food  3)  To scan every 8 hours to obtain 24 hours glucose data.    Subjective and Objective:       Mahad DUONG Orellana is referred by Dr. Morales for Diabetes Education.            Lab Results   Component Value Date     HGBA1C 9.6 (H) 12/31/2019      Component      Latest Ref Rng & Units 9/13/2017 4/25/2018 10/1/2018 2/11/2019   Hemoglobin A1c      3.5 - 6.0 % 8.1 (H) 10.5 (H) 8.5 (H) 8.8 (H)      Component      Latest Ref Rng & Units 7/16/2019 12/31/2019   Hemoglobin A1c      3.5 - 6.0 % 10.4 (H) 9.6 (H)          Education:      Monitoring   Meter (per above goals): assessment, discussed, pt returned demonstration,- scan every 8 hours, needs to work on that  Monitoring: assessment, discussed, pt returned demonstration, literature provided   BG goals: assessment, discussed, pt returned demonstration, literature provided        Nutrition Management:  assessment, discussed, pt returned demo,  literature provided   Weight:assessment, discussed, pt returned demo,  literature provided   Portions/Balance: assessment, discussed, pt returned demo,  literature provided   Carb ID/Count: assessment, discussed, pt returned demo,  literature provided   Label Reading: assessment, discussed, pt returned demo,  literature provided   Heart Healthy Fats:assessment, discussed, pt returned demo,  literature provided   Menu Planning: assessment, discussed, pt returned demo,  literature provided   Dining Out: assessment, discussed,  literature provided   Physical Activity: assessment, discussed,  literature provided   Medications: assessment, discussed  Orals: assessment, discussed  Injected Medications: Assessed, Discussed and Literature provided  - advised to use multiple sites for insulin injection, he tends to use same  spot  Storage/Exp:Discussed   Site Rotation: Assessed, Discussed and Literature provided   Sites Assessed: yes     Diabetes Disease Process: Discussed     Acute Complications: Prevent, Detect, Treat:  Hypoglycemia: Discussed  Hyperglycemia: Discussed  Sick Days: Literature provided     Chronic Complications  Foot Care: literature provided  Skin Care: Literature provided  Eye: Literature provided and Needs instruction/review at follow-up  ABC: Literature provided and Needs instruction/review at follow-up  Teeth:Needs instruction/review at follow-up  Goal Setting and Problem Solving: Discussed  Barriers: Discussed  Psychosocial Adjustments: Discussed        Time spent with the patient: 60 minutes for diabetes education and counseling.   Previous Education: yes  Visit Type:DSMT

## 2021-06-30 NOTE — PROGRESS NOTES
Progress Notes by Petra Lucas RD at 4/12/2021  3:00 PM     Author: Petra Lucas RD Service: -- Author Type: Registered Dietitian    Filed: 4/13/2021  1:19 PM Encounter Date: 4/12/2021 Status: Attested    : Petra Lucas RD (Registered Dietitian) Cosigner: Mahad Morales MD at 4/13/2021  2:58 PM    Attestation signed by Mahad Morales MD at 4/13/2021  2:58 PM    Agree, thanks.                Assessment: Saulo is here for diabetes education, his spouse accompanies him.   He currently uses 80-90 units Relion Novolin 70/30 insulin twice daily- he often takes 80 units twice daily because he doesn't like the volume in his syringe.  Also takes 1,000 mg metformin bid.  He reports not forgetting to take his insulin but he is c/o pain with injection and interested in an update in technology.  He also uses the Freestyle avery, typically scans once daily so only getting 8 hours worth of glucose data:  6 am- 3pm  average BG  369mg/dl.  0% in target range.  He battles uncontrolled bipolar disorder so frequently gets off track in an angry rant.  He is sedentary, comes in wheelchair.   Spouse reports he has poor eating habits; he doesn't feel well.   Using Relion vial/syringe due to financial concerns.            Plan: Reviewed BG targets and increase risk of diabetes complications, symptoms of hyperglycemia discussed.   Provided update on latest technology including V-Go, insulin pump and U500 insulin.  If requiring  200 units or greater daily insulin he may be good candidate for U500.  However, when reaching [facility] this may not be an affordable option.  At that point could go to Relion NPH and regular insulin, which requires more insulin injections per day but less volume.  These options were laid out for Mahad and spouse.   They communicate their interest in U500,  I will consult with Dr. Andrew marrerog this.  He has 5 vials Novolin 70/30 and wishes to use that supply first.      U500  recommendation:   120 units in am (60%0 and 80 units pre dinner (40%)    Following protocol, did recommend to use 90 units Novolin 70/30 bid.  To scan every 8 hours to obtain 24 hours glucose data.   Follow up scheduled in 3-4 weeks.       Subjective and Objective:      Mahad Orellana is referred by Dr. Morales for Diabetes Education.     Lab Results   Component Value Date    HGBA1C 9.6 (H) 12/31/2019     Component      Latest Ref Rng & Units 9/13/2017 4/25/2018 10/1/2018 2/11/2019   Hemoglobin A1c      3.5 - 6.0 % 8.1 (H) 10.5 (H) 8.5 (H) 8.8 (H)     Component      Latest Ref Rng & Units 7/16/2019 12/31/2019   Hemoglobin A1c      3.5 - 6.0 % 10.4 (H) 9.6 (H)       Goals    None         Follow up:   Diabetes classes for 3-4 weeks      Education:     Monitoring   Meter (per above goals): assessment, discussed, pt returned demonstration,  Monitoring: assessment, discussed, pt returned demonstration, literature provided   BG goals: assessment, discussed, pt returned demonstration, literature provided      Nutrition Management:  assessment, discussed, pt returned demo,  literature provided   Weight:assessment, discussed, pt returned demo,  literature provided   Portions/Balance: assessment, discussed, pt returned demo,  literature provided   Carb ID/Count: assessment, discussed, pt returned demo,  literature provided   Label Reading: assessment, discussed, pt returned demo,  literature provided   Heart Healthy Fats:assessment, discussed, pt returned demo,  literature provided   Menu Planning: assessment, discussed, pt returned demo,  literature provided   Dining Out: assessment, discussed,  literature provided   Physical Activity: assessment, discussed,  literature provided   Medications: assessment, discussed  Orals: assessment, discussed  Injected Medications: Assessed, Discussed and Literature provided  - advised to use multiple sites for insulin injection, he tends to use same spot  Storage/Exp:Discussed   Site  Rotation: Assessed, Discussed and Literature provided   Sites Assessed: yes    Diabetes Disease Process: Discussed    Acute Complications: Prevent, Detect, Treat:  Hypoglycemia: Discussed  Hyperglycemia: Discussed  Sick Days: Literature provided    Chronic Complications  Foot Care: literature provided  Skin Care: Literature provided  Eye: Literature provided and Needs instruction/review at follow-up  ABC: Literature provided and Needs instruction/review at follow-up  Teeth:Needs instruction/review at follow-up  Goal Setting and Problem Solving: Discussed  Barriers: Discussed  Psychosocial Adjustments: Discussed      Time spent with the patient: 60 minutes for diabetes education and counseling.   Previous Education: yes  Visit Type:LAVERNE Lucas  4/12/2021

## 2021-07-02 ENCOUNTER — COMMUNICATION - HEALTHEAST (OUTPATIENT)
Dept: SCHEDULING | Facility: CLINIC | Age: 71
End: 2021-07-02

## 2021-07-03 NOTE — ADDENDUM NOTE
Addendum Note by Olimpia Sood RN at 11/13/2017  8:21 PM     Author: Olimpia Sood RN Service: -- Author Type: Registered Nurse    Filed: 11/13/2017  8:21 PM Encounter Date: 11/13/2017 Status: Signed    : Olimpia Sood RN (Registered Nurse)    Addended by: OLIMPIA SOOD on: 11/13/2017 08:21 PM        Modules accepted: Orders

## 2021-07-04 NOTE — TELEPHONE ENCOUNTER
Telephone Encounter by Kendal Brian at 5/25/2021  3:13 PM     Author: Kendal Brian Service: -- Author Type: --    Filed: 5/25/2021  3:16 PM Encounter Date: 5/24/2021 Status: Addendum    : Kendal Brian    Related Notes: Original Note by Kendal Brian filed at 5/25/2021  3:14 PM       PRIOR AUTHORIZATION DENIED    Denial Rational: Coverage is provided when patient is testing their blood sugars with a traditional blood glucose monitor four or more times a day and patient is using insulin injections three or more times per day          Appeal Information: This medication was denied. If physician would like to appeal because patient has contraindication or allergy to covered medication please write letter of medical necessity and route back to PA team to initiate.  If no further action is needed please close encounter thank you.             other language:

## 2021-07-04 NOTE — TELEPHONE ENCOUNTER
Telephone Encounter by Graciela Jovel RN at 7/2/2021  5:01 PM     Author: Graciela Jovel RN Service: -- Author Type: Registered Nurse    Filed: 7/2/2021  5:25 PM Encounter Date: 7/2/2021 Status: Signed    : Graciela Jovel RN (Registered Nurse)       Pt is calling.    Did the Prior Authorization get done for the Freestyle Thomas Sensor Kit?    He is needing the Freestyle Thomas but insurance is not covering it. He needs the physician to write a letter to insurance so that they will cover it.  Hasn't been able to test his blood sugar for 2 days.  Insurance stopped paying for it.   Had 2 sensors left and was using that. The Freestyle Thomas is more accurate.   His blood sugar is always  points higher on the machine, where he was pricking his finger.  His wife has to check his sugars when using the machine to prick his finger. He gets frustrated due to his bi-polar and is unable to check it himself.   With the freestyle thomas, he can do it himself.   [facility] is harassing him. Pt very upset. Yelling on the phone. Very upset. Saying Dr Morales isn't doing his job.     From visit notes from 06/02/2021, may be issue with insurance not covering the Freestyle Thomas system. New machine and lancets to check blood sugar was sent in at that visit.    I advised them that I would contact [facility] in [place] to see what the issue was, and then call her back.    Call to [facility] pharmacy:  Freestyle Thomas needs a prior auth. [facility] will cover it, as soon as the physician fills out the forms and sends them in.    Call back to patient's wife. I advised her that the physician needs to try to get a prior authorization for this sensor. I advised her to have him continue to use his regular blood glucose meter to test on his finger for now, until we can get the auth for the Freestyle Thomas.     Reason for Disposition  ? [1] Caller has URGENT medication question about med that PCP or specialist prescribed AND [2] triager unable to answer  "question    Additional Information  ? Negative: Drug overdose and triager unable to answer question  ? Negative: Caller requesting information unrelated to medicine  ? Negative: Caller requesting a prescription for Strep throat and has a positive culture result  ? Negative: Rash while taking a medication or within 3 days of stopping it  ? Negative: Immunization reaction suspected  ? Negative: [1] Asthma and [2] having symptoms of asthma (cough, wheezing, etc.)  ? Negative: [1] Influenza symptoms AND [2] anti-viral med prescription request, such as Tamiflu  ? Negative: [1] Symptom of illness (e.g., headache, abdominal pain, earache, vomiting) AND [2] more than mild  ? Negative: MORE THAN A DOUBLE DOSE of a prescription or over-the-counter (OTC) drug  ? Negative: [1] DOUBLE DOSE (an extra dose or lesser amount) of over-the-counter (OTC) drug AND [2] any symptoms (e.g., dizziness, nausea, pain, sleepiness)  ? Negative: [1] DOUBLE DOSE (an extra dose or lesser amount) of prescription drug AND [2] any symptoms (e.g., dizziness, nausea, pain, sleepiness)  ? Negative: Took another person's prescription drug  ? Negative: [1] DOUBLE DOSE (an extra dose or lesser amount) of prescription drug AND [2] NO symptoms (Exception: a double dose of antibiotics)  ? Negative: Diabetes drug error or overdose (e.g., took wrong type of insulin or took extra dose)  ? Negative: [1] Request for URGENT new prescription or refill of \"essential\" medication (i.e., likelihood of harm to patient if not taken) AND [2] triager unable to fill per unit policy  ? Negative: [1] Prescription not at pharmacy AND [2] was prescribed by PCP recently  ? Negative: [1] Pharmacy calling with prescription questions AND [2] triager unable to answer question    Protocols used: MEDICATION QUESTION CALL-A-    Graciela Jovel RN   [facility] Nurse Advisor  07/02/21 at 5:14 PM\         "

## 2021-07-05 NOTE — TELEPHONE ENCOUNTER
Telephone Encounter by Lilliana Turner MA at 7/5/2021  8:27 AM     Author: Lilliana Turner MA Service: -- Author Type: Medical Assistant    Filed: 7/5/2021  8:31 AM Encounter Date: 7/2/2021 Status: Signed    : Lilliana Turner MA (Medical Assistant)       CMT reviewed past messages including virtual visit with PCP.  The original PA was denied.  PCP said unless pt can prove an allergy to manual testing with a meter,and that is not possible,  there is nothing more he can do.  Wife checks BS with meter.  More test strips were ordered at virtual visit.  A [facility] appeal form was brought in by pt wife.  It was filled out and mailed to [facility] on June 18th and scanned into chart.  As of today, July 5th, Clinic has received no response from [employer] form.      PCP is not in clinic today but is in clinic tomorrow.  Will route to PCP tomorrow, but per above, there is nothing further that can be done in this case. Thanks.

## 2021-07-13 NOTE — TELEPHONE ENCOUNTER
Telephone Encounter by Mahad Morales MD at 7/13/2021  2:59 PM     Author: Mahad Morales MD Service: -- Author Type: Physician    Filed: 7/13/2021  3:00 PM Encounter Date: 7/2/2021 Status: Signed    : Mahad Morales MD (Physician)       Left VM for patient letting them know we tried to get sensor kit covered but nothing more that we can do that I am aware of.

## 2021-07-13 NOTE — TELEPHONE ENCOUNTER
Telephone Encounter by Romario Feldman at 7/13/2021 10:14 AM     Author: Romario Feldman Service: -- Author Type: Patient Access    Filed: 7/13/2021 11:21 AM Encounter Date: 7/2/2021 Status: Signed    : Romario Feldman (Patient Access)       Pt called back to check if Dr. oMrales has seen this message. Caller states he got off the phone with Gordo this morning and was told he need to call Dr. Morales to check if there's anything else provider can do. Caller is upset that [facility] took away the Thomas. Caller states it's [employer] and Dr. Morales fault if anything happens to him because he hasn't take his blood sugar in 3 weeks. Caller states he's not happy about his care here and he never gets a call back. Caller started calling Dr. Morales names and use profanity and TC decided to cut the call short and inform pt message will be sent to Dr. Morales.

## 2021-07-19 DIAGNOSIS — Z76.0 ENCOUNTER FOR MEDICATION REFILL: Primary | ICD-10-CM

## 2021-07-19 NOTE — TELEPHONE ENCOUNTER
Reason for Call:  Medication or medication refill:    Do you use a [facility]?  Name of the pharmacy and phone number for the current request:  [id number] - [place]], [address]    Name of the medication requested: ofloxacin (OCUFLOX) 0.3 % ophthalmic solution    Other request: Rosie (wife) called to request refills for the above medication.  Caller states pt is complaining of ear ache and this drop has always help him.  They use the last few drops in bottle today. Can Dr. Morales send in refills?    Can we leave a detailed message on this number? YES    Phone number patient can be reached at: Cell number on file:    No relevant phone numbers on file.       Best Time: anytime    Call taken on 7/19/2021 at 3:46 PM by Romario Feldman

## 2021-07-20 RX ORDER — OFLOXACIN 3 MG/ML
10 SOLUTION/ DROPS OPHTHALMIC 2 TIMES DAILY
Qty: 10 ML | Refills: 1 | Status: SHIPPED | OUTPATIENT
Start: 2021-07-20

## 2021-07-29 ENCOUNTER — OFFICE VISIT (OUTPATIENT)
Dept: FAMILY MEDICINE | Facility: CLINIC | Age: 71
End: 2021-07-29
Payer: COMMERCIAL

## 2021-07-29 VITALS
BODY MASS INDEX: 36.18 KG/M2 | TEMPERATURE: 98.5 F | RESPIRATION RATE: 28 BRPM | WEIGHT: 273 LBS | HEART RATE: 73 BPM | DIASTOLIC BLOOD PRESSURE: 80 MMHG | SYSTOLIC BLOOD PRESSURE: 162 MMHG | HEIGHT: 73 IN

## 2021-07-29 DIAGNOSIS — E11.69 TYPE 2 DIABETES MELLITUS WITH OTHER SPECIFIED COMPLICATION, WITH LONG-TERM CURRENT USE OF INSULIN (H): ICD-10-CM

## 2021-07-29 DIAGNOSIS — Z79.4 TYPE 2 DIABETES MELLITUS WITH OTHER SPECIFIED COMPLICATION, WITH LONG-TERM CURRENT USE OF INSULIN (H): ICD-10-CM

## 2021-07-29 DIAGNOSIS — H92.09 OTALGIA, UNSPECIFIED LATERALITY: ICD-10-CM

## 2021-07-29 DIAGNOSIS — F31.9 BIPOLAR AFFECTIVE DISORDER, REMISSION STATUS UNSPECIFIED (H): Primary | ICD-10-CM

## 2021-07-29 LAB — GLUCOSE BLD-MCNC: 167 MG/DL (ref 79–116)

## 2021-07-29 PROCEDURE — 82947 ASSAY GLUCOSE BLOOD QUANT: CPT | Performed by: FAMILY MEDICINE

## 2021-07-29 PROCEDURE — 99215 OFFICE O/P EST HI 40 MIN: CPT | Performed by: FAMILY MEDICINE

## 2021-07-29 PROCEDURE — 36416 COLLJ CAPILLARY BLOOD SPEC: CPT | Performed by: FAMILY MEDICINE

## 2021-07-29 ASSESSMENT — MIFFLIN-ST. JEOR: SCORE: 2037.2

## 2021-07-29 NOTE — PROGRESS NOTES
Assessment & Plan     Type 2 diabetes mellitus with other specified complication, with long-term current use of insulin (H)  - Glucose, whole blood  After being in clinic for an extended time, he mentioned having diabetes and needing to eat. He had taken his morning insulin but not eaten. He was given crackers and shortly after he started these glucose was checked and wnl.     Ear pain  Normal exam. He can continue the ear drops that were prescribed over the phone since he thinks that helped.     Bipolar disorder  The last few notes from his PCP were reviewed. Patient has refused psychiatry or psychology care multiple times in the past. He reports taking his antipsychotic as prescribed and wife confirms. He was at times becoming so angry and shouting that staff became uncomfortable, and decision was made to call the police, who arrived and waited in the hallway. Patient did not threaten anyone, and did not appear to be a risk of immediate harm to himself or others. I was able to speak privately with his wife, who states he gets like this at times, perhaps a little worse this morning, which she attributed to skipping breakfast and meeting with me rather than his PCP, who she states is able to keep him calm. Wife, when asked in private, states she is not concerned that he would be a risk to his own safety, to her safety, or the safety of others. She states he has never threatened to harm anyone or himself, does not have a history of violence, and he does not have access to firearms.  I also spoke with several other staff and providers who know him from previous encounters, and felt that this behavior was basically his baseline. Given his mood irritability and signs that his bipolar does not seem to be well controlled, I encouraged him and his wife repeatedly to go to the ED for psychiatric evaluation, but patient refuses. I also offered IM invega, which Dr. Morales has given him before in clinic, but he refuses. His  "wife thinks he would be more amenable to recommendations from Dr. Morales, so they were advised to follow up asap with him.     Over 50 minutes spent on patient care, speaking with patient and wife, reviewing chart, consulting with other providers regarding his past behavior in clinic.     Bere Mena MD  [facility] ZOLTAN Tobin is a 71 year old who presents for the following health issues     HPI   Patient here with wife for left ear pain, thinks he needs lavage. Tried to get \"ear-eye drops\" over the phone like he has used in the past with success, and was able to get an rx yesterday (cipro drops). Started these yesterday and pain has improved.     Patient is very hard to keep on track. Goes off recurrently on tangents, raising his voice and using inappropriate language and saying offensive things. Most of his complaints have to do with past medical care, but also the state of the world, political issues, Baptism, and various conspiracy theories.     Patient and wife both state that he is taking his medication as prescribed, including Seroquel for bipolar. He does not follow with a psychiatrist or psychologist, as he has refused.          Review of Systems   He is diabetic, on insulin. He took insulin this morning but did not have a chance to eat yet  ROS COMP (Optional):[id number]}      Objective    BP (!) 162/80 (BP Location: Right arm, Patient Position: Sitting, Cuff Size: Adult Regular)   Pulse 73   Temp 98.5  F (36.9  C) (Oral)   Resp 28   Ht 1.854 m (6' 1\")   Wt 123.8 kg (273 lb)   BMI 36.02 kg/m    Body mass index is 36.02 kg/m .  Physical Exam     Gen: NAD  HEENT: PERRL, EAC and TMs clear bilat  Psych: Presents early for appt. Groomed. Alert, oriented. Tangential. Quickly vacillates between irritable and calm. At times, he has somewhat pressured speech and shouting, and then returns to normal volume and rate of speech. He does answer questions before quickly moving on " to his own complaints.

## 2021-08-05 ENCOUNTER — TELEPHONE (OUTPATIENT)
Dept: FAMILY MEDICINE | Facility: CLINIC | Age: 71
End: 2021-08-05

## 2021-08-05 ENCOUNTER — DOCUMENTATION ONLY (OUTPATIENT)
Dept: FAMILY MEDICINE | Facility: CLINIC | Age: 71
End: 2021-08-05

## 2021-08-05 NOTE — TELEPHONE ENCOUNTER
Please see previous notes for details.  The patient has had severely out-of-control mental illness, has a complicated mental illness history and has repeatedly refused psychiatric care.  He does take his quetiapine at bedtime but has stopped taking multiple other treatments as detailed in previous notes.  He repeatedly refuses psychiatric care.  Recent incident here in the clinic where he was extremely disruptive, threatening, and making loud racist comments in front of other patients and staff.  Please see those note for details.    After meeting with our clinic administrative team it was determined that this patient will not be allowed back into the clinic for the safety and consideration of our other patients and staff and providers.  The exception to this would be if the patient is agreeable to getting appropriate psychiatric care and has stabilization and improvement of his severely out-of-control mental illness.  I called to talk to the patient and his wife.  I talked with his wife first and let her know about this.  She understands.  I also talked to her about indications for calling 911 or taking the patient in for a crisis evaluation at the emergency room if he were to start to make directly threatening speech or behaviors towards himself, her, or anyone else.  I specifically informed the patient's wife that the patient is not welcome here at the clinic but that I will continue to provide refills of his medications and interval follow-up until he can establish care, if possible, with a new clinic.  However, the patient has been fired from multiple dental clinics and even from pharmacies and grocery stores recently because of similar behaviors.  I worry that he would not be able to establish care with any new clinician because of his severely out-of-control mental illness and associated behaviors.  The last thing I would want is for him to stop taking his 400 mg of quetiapine at bedtime which improve his  "symptoms significantly and allow him to sleep and allow his wife to have some quiet time.  Therefore, my current plan is to continue to refill his medications  until he can be agreeable with getting psychiatric care.  I then explained all this to the patient.  He was very angry and continued to yell and perseverate about \"[place]\" and \"Asians\" and at one point said \"they are animals.\"    The following are direct quotations of some of the more egregious things that he said during our discussion.  He called our  staff \"fat bullies\" and told me that our clinic is constantly telling him that he needs to sign papers and that we are trying to get him to \"sign my life away.\"  When I informed the patient that he would not be allowed to return to our clinic until he was under the care of a psychiatrist and had improved mental health he said \"you guys are mentally ill not me!\" and \" do not tell me to get another psychiatrist, I'll magaly you.\"   When I talked to him about getting the level of care that I think he needs on an inpatient level he yelled \"Never again!\"-referring to his previous psychiatric hospitalization and that he would never under any circumstances go back into a psychiatric hospital.    Over the last many years the patient has had a mostly positive relationship with myself as his primary care doctor and an almost always positive relationship with the LPN that I work with regularly and has seen him most often - Judi Diaz LPN.  In the past, he has never attacked her verbally but in today's conversation he talked about her angrily and that when she did his ear lavage previously that she \"used old world jungle stuff.\"  He had crescendoing anger and yelling and before transferring the phone back to his wife said in reference to his doctors and other members of his care team \"we have got to get these people exterminated\" and said directly to me \" God is not going to give you any Mercy.\"    I then spoke with " the patient's wife and told her that I would call her in about 2 weeks to follow-up with her and see how she is doing and to see how Mahad is doing and if there has been any reconsideration or possibility of getting him in with a psychiatrist or admitted to the hospital for inpatient care.  She agrees with the plan and I will reach out to her by phone in about 2 weeks.

## 2021-08-05 NOTE — PROGRESS NOTES
"This note is in regards to the pt's appt with Dr. Mena on July 29th.  Dr. Mena came to me because he was escalating, getter louder and louder and making racist remarks.  He did not make an specific threats.  She came to me for assistance in determining whether or not he qualified for a hold to be placed.    I went with her to see the patient; he and his wife had left the exam room and were sitting in the waiting room.  He stated that there was no air in the exam room and he couldn't breathe back there.  She asked him to come back to the exam room with us- he started getting louder and louder saying that there is no air back there and he wants to leave and go eat lunch.     As we were walking to the exam room, he began talking loudly about all the Chinese people and South Korean people and that they all need to die because they are making us all die.  He talked repeatedly about Dr. Morales and Sirena not knowing what they are doing because they took away the glucometer that worked for him.  [facility] and said that he notified the people in charge of this place that Dr. Morales and Sirena need to be kicked out of here.  He also talked about Dr. Thompson (Nirav) and how all the Indians needed to die, too.     He talked about not getting what he wanted from the doctor and all the Chinese people going to [facility] and getting all kinds of opioids when it took him 30 years to get one opioid from  Dr. Morales .  He them said loudly enough that it brought the Clinic Manager down from her office down the campos,  \"this is just like that dale who went in that clinic in [place] and shot everyone because he couldn't get what he wanted.\"  At the point, I called 911 and asked them to send the police.     The clinic manager and I stayed in the room with the patient and he continued to rant about the Chinese and South Korean and opioids and the glucometer that worked and got taken away from him and what terrible people Dr. " Andrew and Sirena are.     His wife, Rosie, talked with the 2 police officers who were outside in the campos with the patient.  She informed them that when his blood sugar is low, this behavior escalates. We checked his BS and it was 167.  His wife said that that is low for him and that it is usually around 228.      Since he did not make specific threats to anyone, neither the police officers or I had adequate reason to put him on a hold.  We escorted him out of the clinic with his wife so they could go out for lunch as they had planned.

## 2021-08-23 DIAGNOSIS — Z76.0 ENCOUNTER FOR MEDICATION REFILL: Primary | ICD-10-CM

## 2021-08-23 RX ORDER — TAMSULOSIN HYDROCHLORIDE 0.4 MG/1
0.4 CAPSULE ORAL DAILY
Qty: 90 CAPSULE | Refills: 3 | Status: SHIPPED | OUTPATIENT
Start: 2021-08-23

## 2021-08-23 RX ORDER — ATENOLOL 50 MG/1
TABLET ORAL
Qty: 90 TABLET | Refills: 3 | Status: SHIPPED | OUTPATIENT
Start: 2021-08-23

## 2021-08-23 RX ORDER — ISOSORBIDE MONONITRATE 30 MG/1
30 TABLET, EXTENDED RELEASE ORAL DAILY
Qty: 90 TABLET | Refills: 3 | Status: SHIPPED | OUTPATIENT
Start: 2021-08-23

## 2021-08-23 RX ORDER — LISINOPRIL 40 MG/1
40 TABLET ORAL DAILY
Qty: 90 TABLET | Refills: 3 | Status: SHIPPED | OUTPATIENT
Start: 2021-08-23

## 2021-08-23 RX ORDER — ATORVASTATIN CALCIUM 20 MG/1
TABLET, FILM COATED ORAL
Qty: 90 TABLET | Refills: 3 | Status: SHIPPED | OUTPATIENT
Start: 2021-08-23

## 2021-08-23 RX ORDER — QUETIAPINE FUMARATE 400 MG/1
TABLET, FILM COATED ORAL
Qty: 90 TABLET | Refills: 3 | Status: SHIPPED | OUTPATIENT
Start: 2021-08-23

## 2021-08-23 RX ORDER — NITROGLYCERIN 0.4 MG/1
TABLET SUBLINGUAL
Qty: 25 TABLET | Refills: 3 | Status: SHIPPED | OUTPATIENT
Start: 2021-08-23

## 2021-08-24 NOTE — PROGRESS NOTES
I was able to talk to the patient's wife and telephone follow-up as planned in the previous discussion.  Patient's wife reports that he has been about the same.  She understands that he can no longer be seen here at the clinic as I discussed with him as documented in the previous note.  She is worried about him running out of his chronic medications and worried that he will not be able to establish with a new clinic because of his ongoing behaviors.  Given that he has been on all of these chronic medications for a long time I sent in refills of all of his chronic medications so that he will not be discontinued from the medications in the near future.  I have strongly encouraged the patient and his wife to do the best they can to try to arrange for psychiatric care as detailed in the previous notes.

## 2021-10-05 ENCOUNTER — MEDICAL CORRESPONDENCE (OUTPATIENT)
Dept: HEALTH INFORMATION MANAGEMENT | Facility: CLINIC | Age: 71
End: 2021-10-05

## 2022-08-15 ENCOUNTER — HOSPITAL ENCOUNTER (EMERGENCY)
Facility: HOSPITAL | Age: 72
Discharge: HOME | End: 2022-08-15
Admitting: STUDENT IN AN ORGANIZED HEALTH CARE EDUCATION/TRAINING PROGRAM
Payer: COMMERCIAL

## 2022-08-15 VITALS
HEIGHT: 72 IN | SYSTOLIC BLOOD PRESSURE: 232 MMHG | OXYGEN SATURATION: 97 % | TEMPERATURE: 97.5 F | HEART RATE: 81 BPM | WEIGHT: 240 LBS | RESPIRATION RATE: 18 BRPM | DIASTOLIC BLOOD PRESSURE: 102 MMHG | BODY MASS INDEX: 32.51 KG/M2

## 2022-08-15 PROCEDURE — 99281 EMR DPT VST MAYX REQ PHY/QHP: CPT

## 2022-08-15 NOTE — ED TRIAGE NOTES
"     Triage Assessment     Row Name 08/15/22 6900       Triage Assessment (Adult)    Airway WDL WDL       Respiratory WDL    Respiratory WDL WDL       Skin Circulation/Temperature WDL    Skin Circulation/Temperature WDL WDL       Cardiac WDL    Cardiac WDL WDL       Peripheral/Neurovascular WDL    Peripheral Neurovascular WDL WDL       Cognitive/Neuro/Behavioral WDL    Cognitive/Neuro/Behavioral WDL WDL            Patient reports that he has had terrible pain throughout body for \"at least 2 years\".  Pt states that he has seen many Drs, noone can figure out what is wrong.  "

## 2022-08-15 NOTE — ED NOTES
ED Provider In Triage Note  [facility]  Encounter Date: Aug 15, 2022    No chief complaint on file.      Brief HPI:   Mahad Orellana is a 72 year old male presenting to the Emergency Department with a chief complaint of numbness of feet.  Patient is a bit agitated and concerned that this could be polio.  Asking to get tested for polio.  Patient very difficult to redirect.  Note from 2021 Dr. Daugherty indicates this is baseline for the patient.  Has known mental illness and does not accept any treatment.    Brief Physical Exam:  There were no vitals taken for this visit.  General: Non-toxic appearing  HEENT: Atraumatic  Resp: No respiratory distress  Abdomen: Non-peritoneal  Neuro: Alert, oriented, answers questions appropriately  Psych: Behavior appropriate      Plan Initiated in Triage:  No orders of the defined types were placed in this encounter.      PIT Dispo:   Doesn't appear to be any acute issue.  Patient may just need referral or pain control.  No acute illness or psych issue present today.    Return to [facility] while awaiting workup and ED bed availability    Zack Oliva MD on 8/15/2022 at 2:24 PM    Patient was evaluated by the Physician in Triage due to a limitation of available rooms in the Emergency Department. A plan of care was discussed based on the information obtained on the initial evaluation and patient was consuled to return back to the Emergency Department [facility] after this initial evalutaiton until results were obtained or a room became available in the Emergency Department. Patient was counseled not to leave prior to receiving the results of their workup.     Zack Oliva MD  [facility] EMERGENCY DEPARTMENT  [address] [place] [id number]  [phone]       Zack Oliva MD  08/15/22 8575

## 2022-08-27 DIAGNOSIS — Z76.0 ENCOUNTER FOR MEDICATION REFILL: ICD-10-CM

## 2022-08-27 RX ORDER — ATORVASTATIN CALCIUM 20 MG/1
TABLET, FILM COATED ORAL
Qty: 90 TABLET | Refills: 0 | OUTPATIENT
Start: 2022-08-27

## 2022-09-07 DIAGNOSIS — Z76.0 ENCOUNTER FOR MEDICATION REFILL: ICD-10-CM

## 2022-09-07 RX ORDER — ATENOLOL 50 MG/1
TABLET ORAL
Qty: 90 TABLET | Refills: 0 | OUTPATIENT
Start: 2022-09-07

## 2022-10-02 DIAGNOSIS — Z76.0 ENCOUNTER FOR MEDICATION REFILL: ICD-10-CM

## 2022-10-31 DIAGNOSIS — Z76.0 ENCOUNTER FOR MEDICATION REFILL: ICD-10-CM

## 2022-11-09 ENCOUNTER — TELEPHONE (OUTPATIENT)
Dept: FAMILY MEDICINE | Facility: CLINIC | Age: 72
End: 2022-11-09

## 2022-11-09 NOTE — TELEPHONE ENCOUNTER
Patient called last week and spoke to Deb (supervisor) about making an appointment at our clinic. Deb reviewed the chart and noted that pt had been dismissed from [facility].     I called the patient's wife today (Rosie) who is listed as the contact for the pt in his chart. Deb witnessed the conversation. I explained that the pt was dismissed from all [facility] clinics (which includes our site) and advised her to call their insurance provider to request a list of in-network primary care providers outside of [facility].     Pt's wife understood and thanked me for the phone call.

## 2022-11-16 DIAGNOSIS — Z76.0 ENCOUNTER FOR MEDICATION REFILL: ICD-10-CM

## 2022-11-16 RX ORDER — ISOSORBIDE MONONITRATE 30 MG/1
TABLET, EXTENDED RELEASE ORAL
Qty: 90 TABLET | Refills: 0 | OUTPATIENT
Start: 2022-11-16

## 2022-12-22 DIAGNOSIS — Z76.0 ENCOUNTER FOR MEDICATION REFILL: ICD-10-CM

## 2022-12-22 RX ORDER — QUETIAPINE FUMARATE 400 MG/1
TABLET, FILM COATED ORAL
Qty: 90 TABLET | Refills: 0 | OUTPATIENT
Start: 2022-12-22

## 2023-12-11 NOTE — TELEPHONE ENCOUNTER
[address] [place] [address] Dr care  [address] [place] [address] [address] [facility], [address] [facility],[address]  Telephone: [phone]    Home health agencies: [address] [place] [address] Phone # [phone], Fax # [phone]    Patient Instructions   Visit Discharge/Physician Orders:  - debridement done today  - Silver nitrate applied today there will be some gray drainage this is normal  - culture taken today we will call if you need an antibiotic  - DH walker shoe given today wear every time you are up  -pain medication sent in today    Home health: [facility] we will send referral    Wound Location: left foot    Dressing orders:     1) Gather wound care supplies and arrange on clean table. 2) Wash your hands with soap and water or use alcohol based hand  for 20 seconds (sing \"Happy Birthday\" twice). 3) Cleanse wounds with normal saline or wound cleanser and gauze. Pat dry with clean gauze. 4) Apply iodoflex to the wound (remove one side of the mesh and apply that side down) cover with ABD pad, roll gauze and coban. Change 3 times per week    Keep all dressings clean & dry. Follow up visit: December 29th at 9:30am     Supplies:    Duration of dressings: 30 days    We have sent your supply order to the following company:  [employer] [facility]  If you don't receive the items you were expecting or don't know what the items are that you received, call the company where the order was sent. If you are unable to obtain wound supplies, continue to use the supplies you have available until you are able to reach us. It is most important to keep the wound covered at all times. It is YOUR responsibility to make sure that supplies are re-ordered before you run out. Re-order telephone numbers are included in each package. Keep next scheduled appointment. Please give 24 hour notice if unable to keep appointment.  [phone]    If you experience any of the following, please call the Wound Care Service during CMT contacted [facility] (Medicare plan) at [phone]. Thank you.